# Patient Record
Sex: MALE | Race: WHITE | NOT HISPANIC OR LATINO | Employment: OTHER | URBAN - METROPOLITAN AREA
[De-identification: names, ages, dates, MRNs, and addresses within clinical notes are randomized per-mention and may not be internally consistent; named-entity substitution may affect disease eponyms.]

---

## 2017-03-22 ENCOUNTER — TRANSFERRED RECORDS (OUTPATIENT)
Dept: HEALTH INFORMATION MANAGEMENT | Facility: CLINIC | Age: 57
End: 2017-03-22

## 2017-08-01 ENCOUNTER — TRANSFERRED RECORDS (OUTPATIENT)
Dept: HEALTH INFORMATION MANAGEMENT | Facility: CLINIC | Age: 57
End: 2017-08-01

## 2017-10-25 ENCOUNTER — TRANSFERRED RECORDS (OUTPATIENT)
Dept: HEALTH INFORMATION MANAGEMENT | Facility: CLINIC | Age: 57
End: 2017-10-25

## 2018-01-26 ENCOUNTER — TELEPHONE (OUTPATIENT)
Dept: AUDIOLOGY | Facility: CLINIC | Age: 58
End: 2018-01-26

## 2018-01-26 DIAGNOSIS — H91.90 DECREASED HEARING, UNSPECIFIED LATERALITY: Primary | ICD-10-CM

## 2018-01-26 NOTE — TELEPHONE ENCOUNTER
Dear Dr. Childress,     To be in compliance with some payers, we must have a Physician's Order to perform Audiology services. Your patient, Wilbert, has an appointment to be seen by one of our Audiologists. Please complete the attached referral order.    We thank you for your cooperation. If you have any questions, please feel free to contact me.    Sheila Landon, CCC-A  Licensed Audiologist #[id number] [phone]

## 2018-02-01 ENCOUNTER — OFFICE VISIT (OUTPATIENT)
Dept: AUDIOLOGY | Facility: CLINIC | Age: 58
End: 2018-02-01
Payer: COMMERCIAL

## 2018-02-01 DIAGNOSIS — H93.13 TINNITUS, BILATERAL: ICD-10-CM

## 2018-02-01 DIAGNOSIS — H90.3 SENSORINEURAL HEARING LOSS, BILATERAL: Primary | ICD-10-CM

## 2018-02-01 PROCEDURE — 92567 TYMPANOMETRY: CPT | Performed by: AUDIOLOGIST

## 2018-02-01 PROCEDURE — 92557 COMPREHENSIVE HEARING TEST: CPT | Performed by: AUDIOLOGIST

## 2018-02-01 PROCEDURE — V5275 EAR IMPRESSION: HCPCS | Mod: RT | Performed by: AUDIOLOGIST

## 2018-02-01 PROCEDURE — 92591 HC HEARING AID EXAM BINAURAL: CPT | Performed by: AUDIOLOGIST

## 2018-02-01 PROCEDURE — 99207 ZZC NO CHARGE LOS: CPT | Performed by: AUDIOLOGIST

## 2018-02-01 NOTE — MR AVS SNAPSHOT
"              After Visit Summary   2/1/2018    Wilbert Hutchins    MRN: [id number]           Patient Information     Date Of Birth          1960        Visit Information        Provider Department      2/1/2018 2:00 PM Linsey Moody AuD [facility]        Today's Diagnoses     Sensorineural hearing loss, bilateral    -  1    Tinnitus, bilateral           Follow-ups after your visit        Your next 10 appointments already scheduled     Feb 22, 2018  8:00 AM CST   New Visit with Gina Landon   [facility] ([facility])    [address] [place] [address]   [phone]              Who to contact     If you have questions or need follow up information about today's clinic visit or your schedule please contact [facility] directly at [phone].  Normal or non-critical lab and imaging results will be communicated to you by MyChart, letter or phone within 4 business days after the clinic has received the results. If you do not hear from us within 7 days, please contact the clinic through MyChart or phone. If you have a critical or abnormal lab result, we will notify you by phone as soon as possible.  Submit refill requests through [employer] or call your pharmacy and they will forward the refill request to us. Please allow 3 business days for your refill to be completed.          Additional Information About Your Visit        MyChart Information     [facility]ara lets you send messages to your doctor, view your test results, renew your prescriptions, schedule appointments and more. To sign up, go to [email or web address] . Click on \"Log in\" on the left side of the screen, which will take you to the Welcome page. Then click on \"Sign up Now\" on the right side of the page.     You will be asked to enter the access code listed below, as well as some personal information. Please follow the directions to create your username and " password.     Your access code is: [email or web address]  Expires: 2018  2:51 PM     Your access code will  in 90 days. If you need help or a new code, please call your [facility] clinic or [phone].        Care EveryWhere ID     This is your Care EveryWhere ID. This could be used by other organizations to access your [facility] medical records  [id number]         Blood Pressure from Last 3 Encounters:   01/12/15 119/67   01/08/15 143/77   13 130/77    Weight from Last 3 Encounters:   01/12/15 206 lb (93.4 kg)   01/08/15 207 lb 3.2 oz (94 kg)   13 193 lb 6.4 oz (87.7 kg)              We Performed the Following     AUDIOGRAM/TYMPANOGRAM - INTERFACE     COMPREHENSIVE HEARING TEST     EAR IMPRESSION, EACH     HEARING AID EXAM BINAURAL     TYMPANOMETRY        Primary Care Provider Office Phone # Fax #    Nubia Childress [phone]       [address] [address]        Equal Access to Services     CAT ASHER AH: Hadii aad ku hadasho Soomaali, waaxda luqadaha, qaybta kaalmada adeegyada, waxay idiin hayaan marcela bhaagt . So [facility] [phone].    ATENCIÓN: Si habla español, tiene a osorio disposición servicios gratuitos de asistencia lingüística. [facility] [phone].    We comply with applicable federal civil rights laws and [place] laws. We do not discriminate on the basis of race, color, national origin, age, disability, sex, sexual orientation, or gender identity.            Thank you!     Thank you for choosing [facility]  for your care. Our goal is always to provide you with excellent care. Hearing back from our patients is one way we can continue to improve our services. Please take a few minutes to complete the written survey that you may receive in the mail after your visit with us. Thank you!             Your Updated Medication List - Protect others around you: Learn how to safely use, store and throw away  your medicines at www.disposemymeds.org.          This list is accurate as of 2/1/18  2:51 PM.  Always use your most recent med list.                   Brand Name Dispense Instructions for use Diagnosis    * albuterol 108 (90 BASE) MCG/ACT Inhaler    PROAIR HFA/PROVENTIL HFA/VENTOLIN HFA    1 Inhaler    Inhale 2 puffs into the lungs every 6 hours as needed for shortness of breath / dyspnea    COPD (chronic obstructive pulmonary disease) (H)       * PROAIR  (90 BASE) MCG/ACT Inhaler   Generic drug:  albuterol     3 Inhaler    INHALE 2 PUFFS INTO THE LUNGS EVERY 6 HOURS AS NEEDED FOR SHORTNESS OF BREATH / DYSPNEA.    COPD (chronic obstructive pulmonary disease) (H)       * Notice:  This list has 2 medication(s) that are the same as other medications prescribed for you. Read the directions carefully, and ask your doctor or other care provider to review them with you.

## 2018-02-01 NOTE — PROGRESS NOTES
AUDIOLOGY REPORT    SUBJECTIVE:  Wilbert Hutchins is a 57 year old male who was seen in the Audiology Clinic at the [facility] for audiologic evaluation, referred by Dr. Nubia Childress. The patient has been seen previously in this clinic on 3/26/2012 for assessment and results indicated mild-moderately severe sensorineural hearing loss bilaterally. The patient reports a suspected decline in hearing. He also reports bilateral tinnitus, which he has had for many years. The patient denies otalgia, aural fullness, otorrhea, and vertigo. Patient currently wears two [employer] Janet 312UZ half shell hearing aids, fit in this clinic on 4/26/2012. Patient is interested in new hearing aids. The patient notes difficulty with communication in a variety of listening situations.     OBJECTIVE:    Otoscopic exam indicates ears are clear of cerumen bilaterally     Pure Tone Thresholds assessed using conventional audiometry with good  reliability from 250-8000 Hz bilaterally using circumaural headphones     RIGHT:  mild and severe sensorineural hearing loss    LEFT:    mild and severe sensorineural hearing loss    Tympanogram:    RIGHT: normal eardrum mobility    LEFT:   normal eardrum mobility    Speech Reception Threshold:    RIGHT: 45 dB HL    LEFT:   45 dB HL    Word Recognition Score:     RIGHT: 92% at 85 dB HL using NU-6 recorded word list.    LEFT:   88% at 85 dB HL using NU-6 recorded word list.    Results of today's pure tone testing demonstrate a significant decline in hearing in the right ear at all frequencies tested except for 500 Hz, as well as in the left ear at 1000 Hz and 4059-8656 Hz re: audiogram from 3/26/2012. Patient is a hearing aid candidate. Patient would like to move forward with a hearing aid evaluation today. Therefore, the patient was presented with different options for amplification to help aid in communication. Discussed styles, levels of technology and monaural vs. binaural fitting.     The  hearing aid(s) mutually chosen were:  Binaural: ReSound Linx2 7 half shell  COLOR: pink  BATTERY SIZE: 312  EARMOLD/TIPS: not applicable  CANAL/ LENGTH: not applicable    Otoscopy revealed ears are clear of cerumen bilaterally. Bilateral earmolds were taken without incident.    ASSESSMENT:   Bilateral sensorineural hearing loss     Reviewed purchase information and warranty information with patient. The 45 day trial period was explained to patient. The patient was given a copy of the [place] [employer] consumer brochure on purchasing hearing instruments. Patient risk factors have been provided to the patient in writing prior to the sale of the hearing aid per FDA regulation. The risk factors are also available in the User Instructional Booklet to be presented on the day of the hearing aid fitting. Hearing aid(s) ordered. Hearing aid evaluation completed.    PLAN: Wilbert is scheduled to return in 2-3 weeks for a hearing aid fitting and programming. Purchase agreement will be completed on that date. Please contact this clinic with any questions or concerns.    Sheila Sandoval, CCC-A  Licensed Audiologist #[id number]  2/1/2018

## 2018-02-22 ENCOUNTER — OFFICE VISIT (OUTPATIENT)
Dept: AUDIOLOGY | Facility: CLINIC | Age: 58
End: 2018-02-22
Payer: COMMERCIAL

## 2018-02-22 DIAGNOSIS — H90.3 SENSORINEURAL HEARING LOSS, BILATERAL: Primary | ICD-10-CM

## 2018-02-22 PROCEDURE — V5020 CONFORMITY EVALUATION: HCPCS | Mod: LT | Performed by: AUDIOLOGIST

## 2018-02-22 PROCEDURE — V5160 DISPENSING FEE BINAURAL: HCPCS | Performed by: AUDIOLOGIST

## 2018-02-22 PROCEDURE — V5011 HEARING AID FITTING/CHECKING: HCPCS | Mod: RT | Performed by: AUDIOLOGIST

## 2018-02-22 PROCEDURE — 99207 ZZC NO CHARGE LOS: CPT | Performed by: AUDIOLOGIST

## 2018-02-22 PROCEDURE — V5020 CONFORMITY EVALUATION: HCPCS | Mod: RT | Performed by: AUDIOLOGIST

## 2018-02-22 PROCEDURE — V5260 HEARING AID, DIGIT, BIN, ITE: HCPCS | Mod: NU | Performed by: AUDIOLOGIST

## 2018-02-22 NOTE — MR AVS SNAPSHOT
"              After Visit Summary   2/22/2018    Wilbert Hutchins    MRN: [id number]           Patient Information     Date Of Birth          1960        Visit Information        Provider Department      2/22/2018 8:00 AM Linsey Moody AuD [facility]        Today's Diagnoses     Sensorineural hearing loss, bilateral    -  1       Follow-ups after your visit        Your next 10 appointments already scheduled     Mar 08, 2018 10:00 AM CST   Return Visit with Gina Landon   [facility]    [address] [place] [address]  [place] [address]   [phone]              Who to contact     If you have questions or need follow up information about today's clinic visit or your schedule please contact [facility] directly at [phone].  Normal or non-critical lab and imaging results will be communicated to you by MyChart, letter or phone within 4 business days after the clinic has received the results. If you do not hear from us within 7 days, please contact the clinic through MyChart or phone. If you have a critical or abnormal lab result, we will notify you by phone as soon as possible.  Submit refill requests through [employer] or call your pharmacy and they will forward the refill request to us. Please allow 3 business days for your refill to be completed.          Additional Information About Your Visit        MyChart Information     [employer] lets you send messages to your doctor, view your test results, renew your prescriptions, schedule appointments and more. To sign up, go to [email or web address] . Click on \"Log in\" on the left side of the screen, which will take you to the Welcome page. Then click on \"Sign up Now\" on the right side of the page.     You will be asked to enter the access code listed below, as well as some personal information. Please follow the directions to create your username and password.     Your access code " is: [email or web address]  Expires: 2018  2:51 PM     Your access code will  in 90 days. If you need help or a new code, please call your [facility] clinic or [phone].        Care EveryWhere ID     This is your Care EveryWhere ID. This could be used by other organizations to access your [facility] medical records  [id number]         Blood Pressure from Last 3 Encounters:   01/12/15 119/67   01/08/15 143/77   13 130/77    Weight from Last 3 Encounters:   01/12/15 206 lb (93.4 kg)   01/08/15 207 lb 3.2 oz (94 kg)   13 193 lb 6.4 oz (87.7 kg)              We Performed the Following     DISPENSING FEE, BINAURAL HEARING AID     HEARING AID CHECK, BINAURAL     HEARING AID CONFORMITY EVALUATION     HEARING AID FIT/ORIENTATION/CHECK     HEARING AID ITE DIGITAL, BINAURAL        Primary Care Provider Office Phone # Fax #    Nubia Childress [phone]       [address] [address]        Equal Access to Services     CAT ASHER AH: Hadii aad ku hadasho Soomaali, waaxda luqadaha, qaybta kaalmada adeegyada, waxay idiin hayaan adeeg lolitaarachristoph laalession ah. So [facility] [phone].    ATENCIÓN: Si habla español, tiene a osorio disposición servicios gratuitos de asistencia lingüística. Edilame al [phone].    We comply with applicable federal civil rights laws and [place] laws. We do not discriminate on the basis of race, color, national origin, age, disability, sex, sexual orientation, or gender identity.            Thank you!     Thank you for choosing [facility]  for your care. Our goal is always to provide you with excellent care. Hearing back from our patients is one way we can continue to improve our services. Please take a few minutes to complete the written survey that you may receive in the mail after your visit with us. Thank you!             Your Updated Medication List - Protect others around you: Learn how to safely use, store and  throw away your medicines at www.disposemymeds.org.          This list is accurate as of 2/22/18  8:52 AM.  Always use your most recent med list.                   Brand Name Dispense Instructions for use Diagnosis    * albuterol 108 (90 BASE) MCG/ACT Inhaler    PROAIR HFA/PROVENTIL HFA/VENTOLIN HFA    1 Inhaler    Inhale 2 puffs into the lungs every 6 hours as needed for shortness of breath / dyspnea    COPD (chronic obstructive pulmonary disease) (H)       * PROAIR  (90 BASE) MCG/ACT Inhaler   Generic drug:  albuterol     3 Inhaler    INHALE 2 PUFFS INTO THE LUNGS EVERY 6 HOURS AS NEEDED FOR SHORTNESS OF BREATH / DYSPNEA.    COPD (chronic obstructive pulmonary disease) (H)       * Notice:  This list has 2 medication(s) that are the same as other medications prescribed for you. Read the directions carefully, and ask your doctor or other care provider to review them with you.

## 2018-03-05 ENCOUNTER — TRANSFERRED RECORDS (OUTPATIENT)
Dept: HEALTH INFORMATION MANAGEMENT | Facility: CLINIC | Age: 58
End: 2018-03-05

## 2018-03-08 ENCOUNTER — OFFICE VISIT (OUTPATIENT)
Dept: AUDIOLOGY | Facility: CLINIC | Age: 58
End: 2018-03-08
Payer: COMMERCIAL

## 2018-03-08 DIAGNOSIS — H90.3 SENSORINEURAL HEARING LOSS, BILATERAL: Primary | ICD-10-CM

## 2018-03-08 PROCEDURE — V5266 BATTERY FOR HEARING DEVICE: HCPCS | Performed by: AUDIOLOGIST

## 2018-03-08 PROCEDURE — 99207 ZZC NO CHARGE LOS: CPT | Performed by: AUDIOLOGIST

## 2018-03-08 NOTE — PROGRESS NOTES
AUDIOLOGY REPORT    SUBJECTIVE:Wilbert Hutchins is a 57 year old male who was seen in the Audiology Clinic at the [facility] on 3/8/2018  for a follow-up check regarding the fitting of new hearing aids. Previous results have revealed miild-severe sensorineural hearing loss bilaterally.  The patient has been seen previously in this clinic and was fit with ReSound Linx2 7 ITEs on 2/22/2018.  Wilbert reports good sound quality with the hearing aid(s). He reports that he wears the hearing aids all day every day. He does note an occasional random beep, which seems to be preceded by a brief decrease in loudness. This is a different beep than the low battery indicator tone. Patient reports that batteries are lasting approximately 3 days. He would like the left hearing aid to be turned up a bit today.    OBJECTIVE:   The International Outcome Inventory-Hearing Aids (IOI-HA) was administered today.The patient s responses to the 7 questions can be compared to normative data relative to how others are performing with their hearing aids, as well as focusing audiologic care and counseling.This patient s Quality of Life score (Question 7) was 5, which is above normative average.     Based on patient report, the following changes were made: increased overall gain in the left hearing aid by four steps. Patient was pleased with this and felt that things sounded more balanced.    Checked data logging, which demonstrated 15 hours of average daily use time.     Played various indicator tones for patient and determined that the beep he has heard is the volume change signal. Discussed that the volume control may be getting bumped accidentally, or there may be an issue internal to the hearing aid. Patient reports this has only happened about three times, so he would like to continue to monitor for now.     Reviewed 45 day trial period, care, cleaning (no water, dry brush), batteries (size 312) insertion/removal,  toxicity, low-battery signal), aid insertion/removal, volume adjustment (if applicable), user booklet, warranty information, storage cases, and other hearing aid details.     Per guidelines of patient's insurance, 30 units of size 312 batteries were dispensed today. Reviewed that patent is eligible for batteries once every 90 days, and how they can request new batteries.     ASSESSMENT: A follow-up appointment for hearing aid fitting was completed today. IOI-HA administered today. Changes to hearing aids were completed as outlined above.     PLAN:Wilbert will return for follow-up as needed, or at least every 9-12 months for cleaning and assessment of hearing aid.  . Please call this clinic with any questions regarding today s appointment.    Sheila Sandoval, CCC-A  Licensed Audiologist #[id number]  3/8/2018

## 2018-03-08 NOTE — MR AVS SNAPSHOT
"              After Visit Summary   3/8/2018    Wilbetr Hutchins    MRN: [id number]           Patient Information     Date Of Birth          1960        Visit Information        Provider Department      3/8/2018 10:00 AM Linsey Moody AuD [facility]        Today's Diagnoses     Sensorineural hearing loss, bilateral    -  1       Follow-ups after your visit        Who to contact     If you have questions or need follow up information about today's clinic visit or your schedule please contact [facility] directly at [phone].  Normal or non-critical lab and imaging results will be communicated to you by Qstreamhart, letter or phone within 4 business days after the clinic has received the results. If you do not hear from us within 7 days, please contact the clinic through Qstreamhart or phone. If you have a critical or abnormal lab result, we will notify you by phone as soon as possible.  Submit refill requests through [facility] or call your pharmacy and they will forward the refill request to us. Please allow 3 business days for your refill to be completed.          Additional Information About Your Visit        MyChart Information     [facility] lets you send messages to your doctor, view your test results, renew your prescriptions, schedule appointments and more. To sign up, go to [email or web address] . Click on \"Log in\" on the left side of the screen, which will take you to the Welcome page. Then click on \"Sign up Now\" on the right side of the page.     You will be asked to enter the access code listed below, as well as some personal information. Please follow the directions to create your username and password.     Your access code is: [email or web address]  Expires: 2018  2:51 PM     Your access code will  in 90 days. If you need help or a new code, please call your [facility] or [phone].        Care EveryWhere ID     This is your Care EveryWhere ID. This could be " used by other organizations to access your [facility] medical records  [id number]         Blood Pressure from Last 3 Encounters:   01/12/15 119/67   01/08/15 143/77   12/11/13 130/77    Weight from Last 3 Encounters:   01/12/15 206 lb (93.4 kg)   01/08/15 207 lb 3.2 oz (94 kg)   12/11/13 193 lb 6.4 oz (87.7 kg)              We Performed the Following     HC HEARING DEVICE BATTERY        Primary Care Provider Office Phone # Fax #    Nubia Childress [phone]       [facility] [address] [facility] [address]        Equal Access to Services     CAT ASHER : Hadii aad ku hadasho Soal, waaxda luqadaha, qaybta kaalmada adeegyada, waxdiana bhagat . So [facility] [phone].    ATENCIÓN: Si habla español, tiene a osorio disposición servicios gratuitos de asistencia lingüística. Llame al [phone].    We comply with applicable federal civil rights laws and [place] laws. We do not discriminate on the basis of race, color, national origin, age, disability, sex, sexual orientation, or gender identity.            Thank you!     Thank you for choosing [facility]  for your care. Our goal is always to provide you with excellent care. Hearing back from our patients is one way we can continue to improve our services. Please take a few minutes to complete the written survey that you may receive in the mail after your visit with us. Thank you!             Your Updated Medication List - Protect others around you: Learn how to safely use, store and throw away your medicines at www.disposemymeds.org.          This list is accurate as of 3/8/18 10:27 AM.  Always use your most recent med list.                   Brand Name Dispense Instructions for use Diagnosis    * albuterol 108 (90 BASE) MCG/ACT Inhaler    PROAIR HFA/PROVENTIL HFA/VENTOLIN HFA    1 Inhaler    Inhale 2 puffs into the lungs every 6 hours as needed for shortness of breath / dyspnea     COPD (chronic obstructive pulmonary disease) (H)       * PROAIR  (90 BASE) MCG/ACT Inhaler   Generic drug:  albuterol     3 Inhaler    INHALE 2 PUFFS INTO THE LUNGS EVERY 6 HOURS AS NEEDED FOR SHORTNESS OF BREATH / DYSPNEA.    COPD (chronic obstructive pulmonary disease) (H)       * Notice:  This list has 2 medication(s) that are the same as other medications prescribed for you. Read the directions carefully, and ask your doctor or other care provider to review them with you.

## 2018-05-09 ENCOUNTER — TRANSFERRED RECORDS (OUTPATIENT)
Dept: HEALTH INFORMATION MANAGEMENT | Facility: CLINIC | Age: 58
End: 2018-05-09

## 2018-05-30 ENCOUNTER — TRANSFERRED RECORDS (OUTPATIENT)
Dept: HEALTH INFORMATION MANAGEMENT | Facility: CLINIC | Age: 58
End: 2018-05-30

## 2018-05-30 ENCOUNTER — TELEPHONE (OUTPATIENT)
Dept: AUDIOLOGY | Facility: CLINIC | Age: 58
End: 2018-05-30

## 2018-05-30 NOTE — TELEPHONE ENCOUNTER
Left message informing patient that he is not eligible for batteries until 6/8. Informed him that I can mail batteries to him on the 8th (it will take about a week for him to receive them), or he can stop in and pick them up on 6/8. Let patient know that I will plan on mailing batteries on 6/8 unless he calls to let me know that he would like to come and pick them up.     Sheila Sandoval, CCC-A  Licensed Audiologist #[id number]  5/30/2018

## 2018-05-30 NOTE — TELEPHONE ENCOUNTER
Reason for Call:  Other call back    Detailed comments:  Patient calling. He needs batteries for his hearing aids. Please call and let know.     Phone Number Patient can be reached at: Home number on file [phone] (home)    Best Time:  any    Can we leave a detailed message on this number? YES    Call taken on 5/30/2018 at 9:44 AM by Nara Love

## 2018-06-08 DIAGNOSIS — H90.3 SENSORINEURAL HEARING LOSS, BILATERAL: Primary | ICD-10-CM

## 2018-06-08 PROCEDURE — V5266 BATTERY FOR HEARING DEVICE: HCPCS | Performed by: AUDIOLOGIST

## 2018-06-08 NOTE — PROGRESS NOTES
Patient calls to request his 3 month supply of batteries be mailed to his home address. Mailed 24 size 312 batteries to the address on file.     CHARGES:   .002 x24 Batteries ($1). Total: $24. Bill to patient's insurance.    Sheila Sandoval, CCC-A  Licensed Audiologist #[id number]  6/8/2018

## 2018-10-11 ENCOUNTER — TRANSFERRED RECORDS (OUTPATIENT)
Dept: HEALTH INFORMATION MANAGEMENT | Facility: CLINIC | Age: 58
End: 2018-10-11

## 2019-04-03 ENCOUNTER — TRANSFERRED RECORDS (OUTPATIENT)
Dept: HEALTH INFORMATION MANAGEMENT | Facility: CLINIC | Age: 59
End: 2019-04-03

## 2019-04-24 ENCOUNTER — TRANSFERRED RECORDS (OUTPATIENT)
Dept: HEALTH INFORMATION MANAGEMENT | Facility: CLINIC | Age: 59
End: 2019-04-24

## 2019-05-02 DIAGNOSIS — J84.9 ILD (INTERSTITIAL LUNG DISEASE) (H): Primary | ICD-10-CM

## 2019-05-03 ENCOUNTER — MEDICAL CORRESPONDENCE (OUTPATIENT)
Dept: HEALTH INFORMATION MANAGEMENT | Facility: CLINIC | Age: 59
End: 2019-05-03

## 2019-05-28 ASSESSMENT — ENCOUNTER SYMPTOMS
WEIGHT GAIN: 0
COUGH: 1
SNORES LOUDLY: 0
POLYDIPSIA: 0
WEIGHT LOSS: 0
CHILLS: 1
POSTURAL DYSPNEA: 1
DYSPNEA ON EXERTION: 1
HEMOPTYSIS: 0
SHORTNESS OF BREATH: 1
NIGHT SWEATS: 0
WHEEZING: 1
POLYPHAGIA: 0
FEVER: 0
DECREASED APPETITE: 1
FATIGUE: 1
INCREASED ENERGY: 1
HALLUCINATIONS: 0
HOARSE VOICE: 0
SPUTUM PRODUCTION: 1
SORE THROAT: 0
SINUS PAIN: 0
SINUS CONGESTION: 1
TROUBLE SWALLOWING: 0
NECK MASS: 0
COUGH DISTURBING SLEEP: 1
SMELL DISTURBANCE: 0
TASTE DISTURBANCE: 0
ALTERED TEMPERATURE REGULATION: 1

## 2019-05-31 ENCOUNTER — OFFICE VISIT (OUTPATIENT)
Dept: PULMONOLOGY | Facility: CLINIC | Age: 59
End: 2019-05-31
Attending: INTERNAL MEDICINE
Payer: COMMERCIAL

## 2019-05-31 ENCOUNTER — ANCILLARY PROCEDURE (OUTPATIENT)
Dept: CT IMAGING | Facility: CLINIC | Age: 59
End: 2019-05-31
Attending: INTERNAL MEDICINE
Payer: COMMERCIAL

## 2019-05-31 VITALS
DIASTOLIC BLOOD PRESSURE: 81 MMHG | BODY MASS INDEX: 32.93 KG/M2 | OXYGEN SATURATION: 96 % | SYSTOLIC BLOOD PRESSURE: 136 MMHG | WEIGHT: 230 LBS | HEART RATE: 61 BPM | HEIGHT: 70 IN

## 2019-05-31 DIAGNOSIS — M25.541 ARTHRALGIA OF BOTH HANDS: ICD-10-CM

## 2019-05-31 DIAGNOSIS — J84.9 ILD (INTERSTITIAL LUNG DISEASE) (H): ICD-10-CM

## 2019-05-31 DIAGNOSIS — M25.542 ARTHRALGIA OF BOTH HANDS: ICD-10-CM

## 2019-05-31 DIAGNOSIS — Z72.0 CURRENT TOBACCO USE: ICD-10-CM

## 2019-05-31 DIAGNOSIS — F12.90 MARIJUANA USE: ICD-10-CM

## 2019-05-31 DIAGNOSIS — J84.9 ILD (INTERSTITIAL LUNG DISEASE) (H): Primary | ICD-10-CM

## 2019-05-31 PROBLEM — M25.50 ARTHRALGIA: Status: ACTIVE | Noted: 2019-05-31

## 2019-05-31 LAB
6 MIN WALK (FT): 785 FT
6 MIN WALK (M): 239 M
ALBUMIN SERPL-MCNC: 3.6 G/DL (ref 3.4–5)
ALP SERPL-CCNC: 67 U/L (ref 40–150)
ALT SERPL W P-5'-P-CCNC: 20 U/L (ref 0–70)
ANION GAP SERPL CALCULATED.3IONS-SCNC: 8 MMOL/L (ref 3–14)
AST SERPL W P-5'-P-CCNC: 11 U/L (ref 0–45)
BASOPHILS # BLD AUTO: 0.1 10E9/L (ref 0–0.2)
BASOPHILS NFR BLD AUTO: 0.5 %
BILIRUB SERPL-MCNC: 0.3 MG/DL (ref 0.2–1.3)
BUN SERPL-MCNC: 11 MG/DL (ref 7–30)
CALCIUM SERPL-MCNC: 9.7 MG/DL (ref 8.5–10.1)
CHLORIDE SERPL-SCNC: 107 MMOL/L (ref 94–109)
CK SERPL-CCNC: 83 U/L (ref 30–300)
CO2 SERPL-SCNC: 24 MMOL/L (ref 20–32)
CREAT SERPL-MCNC: 0.91 MG/DL (ref 0.66–1.25)
CRP SERPL-MCNC: 24.8 MG/L (ref 0–8)
DIFFERENTIAL METHOD BLD: ABNORMAL
ENA JO1 IGG SER-ACNC: <0.2 AI (ref 0–0.9)
ENA SCL70 IGG SER IA-ACNC: <0.2 AI (ref 0–0.9)
ENA SS-A IGG SER IA-ACNC: <0.2 AI (ref 0–0.9)
ENA SS-B IGG SER IA-ACNC: <0.2 AI (ref 0–0.9)
EOSINOPHIL # BLD AUTO: 0.2 10E9/L (ref 0–0.7)
EOSINOPHIL NFR BLD AUTO: 1.5 %
ERYTHROCYTE [DISTWIDTH] IN BLOOD BY AUTOMATED COUNT: 13.2 % (ref 10–15)
ERYTHROCYTE [SEDIMENTATION RATE] IN BLOOD BY WESTERGREN METHOD: 21 MM/H (ref 0–20)
GFR SERPL CREATININE-BSD FRML MDRD: >90 ML/MIN/{1.73_M2}
GLUCOSE SERPL-MCNC: 118 MG/DL (ref 70–99)
HCT VFR BLD AUTO: 45.9 % (ref 40–53)
HGB BLD-MCNC: 14.8 G/DL (ref 13.3–17.7)
IGG SERPL-MCNC: 536 MG/DL (ref 695–1620)
IGG1 SER-MCNC: 246 MG/DL (ref 300–856)
IGG2 SER-MCNC: 245 MG/DL (ref 158–761)
IGG3 SER-MCNC: 38 MG/DL (ref 24–192)
IGG4 SER-MCNC: 76 MG/DL (ref 11–86)
IMM GRANULOCYTES # BLD: 0.1 10E9/L (ref 0–0.4)
IMM GRANULOCYTES NFR BLD: 0.8 %
LYMPHOCYTES # BLD AUTO: 2.1 10E9/L (ref 0.8–5.3)
LYMPHOCYTES NFR BLD AUTO: 13 %
MCH RBC QN AUTO: 28.6 PG (ref 26.5–33)
MCHC RBC AUTO-ENTMCNC: 32.2 G/DL (ref 31.5–36.5)
MCV RBC AUTO: 89 FL (ref 78–100)
MONOCYTES # BLD AUTO: 1.3 10E9/L (ref 0–1.3)
MONOCYTES NFR BLD AUTO: 8.3 %
NEUTROPHILS # BLD AUTO: 12.2 10E9/L (ref 1.6–8.3)
NEUTROPHILS NFR BLD AUTO: 75.9 %
NRBC # BLD AUTO: 0 10*3/UL
NRBC BLD AUTO-RTO: 0 /100
PLATELET # BLD AUTO: 309 10E9/L (ref 150–450)
POTASSIUM SERPL-SCNC: 3.8 MMOL/L (ref 3.4–5.3)
PROT SERPL-MCNC: 7.5 G/DL (ref 6.8–8.8)
RBC # BLD AUTO: 5.17 10E12/L (ref 4.4–5.9)
RHEUMATOID FACT SER NEPH-ACNC: 52 IU/ML (ref 0–20)
SODIUM SERPL-SCNC: 138 MMOL/L (ref 133–144)
WBC # BLD AUTO: 16.1 10E9/L (ref 4–11)

## 2019-05-31 PROCEDURE — 86431 RHEUMATOID FACTOR QUANT: CPT | Performed by: INTERNAL MEDICINE

## 2019-05-31 PROCEDURE — 86200 CCP ANTIBODY: CPT | Performed by: INTERNAL MEDICINE

## 2019-05-31 PROCEDURE — 82784 ASSAY IGA/IGD/IGG/IGM EACH: CPT | Performed by: INTERNAL MEDICINE

## 2019-05-31 PROCEDURE — 82085 ASSAY OF ALDOLASE: CPT | Performed by: INTERNAL MEDICINE

## 2019-05-31 PROCEDURE — 86235 NUCLEAR ANTIGEN ANTIBODY: CPT | Performed by: INTERNAL MEDICINE

## 2019-05-31 PROCEDURE — 82657 ENZYME CELL ACTIVITY: CPT | Performed by: INTERNAL MEDICINE

## 2019-05-31 PROCEDURE — 80053 COMPREHEN METABOLIC PANEL: CPT | Performed by: INTERNAL MEDICINE

## 2019-05-31 PROCEDURE — 86255 FLUORESCENT ANTIBODY SCREEN: CPT | Performed by: INTERNAL MEDICINE

## 2019-05-31 PROCEDURE — 86606 ASPERGILLUS ANTIBODY: CPT | Performed by: INTERNAL MEDICINE

## 2019-05-31 PROCEDURE — 36415 COLL VENOUS BLD VENIPUNCTURE: CPT | Performed by: INTERNAL MEDICINE

## 2019-05-31 PROCEDURE — 85025 COMPLETE CBC W/AUTO DIFF WBC: CPT | Performed by: INTERNAL MEDICINE

## 2019-05-31 PROCEDURE — 82787 IGG 1 2 3 OR 4 EACH: CPT | Performed by: INTERNAL MEDICINE

## 2019-05-31 PROCEDURE — 86331 IMMUNODIFFUSION OUCHTERLONY: CPT | Performed by: INTERNAL MEDICINE

## 2019-05-31 PROCEDURE — 82550 ASSAY OF CK (CPK): CPT | Performed by: INTERNAL MEDICINE

## 2019-05-31 PROCEDURE — G0463 HOSPITAL OUTPT CLINIC VISIT: HCPCS

## 2019-05-31 PROCEDURE — 86038 ANTINUCLEAR ANTIBODIES: CPT | Performed by: INTERNAL MEDICINE

## 2019-05-31 PROCEDURE — 85652 RBC SED RATE AUTOMATED: CPT | Performed by: INTERNAL MEDICINE

## 2019-05-31 PROCEDURE — 86140 C-REACTIVE PROTEIN: CPT | Performed by: INTERNAL MEDICINE

## 2019-05-31 RX ORDER — SERTRALINE HYDROCHLORIDE 100 MG/1
100 TABLET, FILM COATED ORAL DAILY
COMMUNITY

## 2019-05-31 RX ORDER — GABAPENTIN 300 MG/1
600 CAPSULE ORAL 3 TIMES DAILY
COMMUNITY
Start: 2019-04-01

## 2019-05-31 RX ORDER — FOLIC ACID 1 MG/1
1 TABLET ORAL
COMMUNITY
Start: 2018-10-11 | End: 2021-04-15

## 2019-05-31 RX ORDER — PREDNISONE 10 MG/1
13 TABLET ORAL
COMMUNITY
Start: 2019-04-24 | End: 2021-09-14

## 2019-05-31 RX ORDER — SIMVASTATIN 40 MG
40 TABLET ORAL AT BEDTIME
Refills: 3 | COMMUNITY
Start: 2019-05-07

## 2019-05-31 RX ORDER — SULFAMETHOXAZOLE AND TRIMETHOPRIM 400; 80 MG/1; MG/1
1 TABLET ORAL
COMMUNITY
Start: 2019-04-03 | End: 2021-04-15

## 2019-05-31 RX ORDER — MYCOPHENOLATE MOFETIL 500 MG/1
TABLET ORAL
Refills: 3 | COMMUNITY
Start: 2019-04-06 | End: 2019-05-31

## 2019-05-31 RX ORDER — IBUPROFEN 800 MG/1
TABLET, FILM COATED ORAL
COMMUNITY
Start: 2012-05-29

## 2019-05-31 RX ORDER — HYDROXYCHLOROQUINE SULFATE 200 MG/1
200 TABLET, FILM COATED ORAL DAILY
COMMUNITY
Start: 2018-10-11

## 2019-05-31 RX ORDER — PEN NEEDLE, DIABETIC 29 G X1/2"
NEEDLE, DISPOSABLE MISCELLANEOUS
Refills: 11 | COMMUNITY
Start: 2019-03-04 | End: 2021-07-21

## 2019-05-31 RX ORDER — FLUTICASONE PROPIONATE 50 MCG
1 SPRAY, SUSPENSION (ML) NASAL DAILY
COMMUNITY
Start: 2018-10-11

## 2019-05-31 RX ORDER — CETIRIZINE HYDROCHLORIDE 10 MG/1
10 TABLET ORAL DAILY
COMMUNITY
Start: 2019-01-06

## 2019-05-31 RX ORDER — SYRINGE-NEEDLE,INSULIN,0.5 ML 27GX1/2"
SYRINGE, EMPTY DISPOSABLE MISCELLANEOUS
COMMUNITY
Start: 2019-02-26 | End: 2021-04-15

## 2019-05-31 ASSESSMENT — PAIN SCALES - GENERAL: PAINLEVEL: NO PAIN (0)

## 2019-05-31 ASSESSMENT — MIFFLIN-ST. JEOR: SCORE: 1874.52
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
## 2019-05-31 NOTE — NURSING NOTE
Chief Complaint   Patient presents with     New Patient     ild/ctd     Vitals were taken and medications were reconciled.     KATE Inman

## 2019-05-31 NOTE — NURSING NOTE
RN met with patient and gave clinic contact information sheet. [facility]. Overnight ox also sent to [facility].   Marah Sparks RN BSN

## 2019-06-01 LAB — ALDOLASE SERPL-CCNC: 5.9 U/L (ref 1.5–8.1)

## 2019-06-03 DIAGNOSIS — J84.9 ILD (INTERSTITIAL LUNG DISEASE) (H): Primary | ICD-10-CM

## 2019-06-03 LAB
ANA SER QL IF: NEGATIVE
ANCA AB PATTERN SER IF-IMP: NORMAL
C-ANCA TITR SER IF: NORMAL {TITER}
CCP AB SER IA-ACNC: 76 U/ML
TPMT BLD-CCNC: 26 U/ML (ref 24–44)

## 2019-06-05 ENCOUNTER — TRANSFERRED RECORDS (OUTPATIENT)
Dept: HEALTH INFORMATION MANAGEMENT | Facility: CLINIC | Age: 59
End: 2019-06-05

## 2019-06-05 LAB
A FUMIGATUS1 AB SER QL ID: NORMAL
A FUMIGATUS6 AB SER QL ID: NORMAL
A PULLULANS AB SER QL ID: NORMAL
PIGEON DROP IGE QN: NORMAL
S RECTIVIRGULA AB SER QL ID: NORMAL
T VULGARIS AB SER QL ID: NORMAL

## 2019-06-07 LAB — LAB SCANNED RESULT: NORMAL

## 2019-06-10 LAB
DLCOUNC-%PRED-PRE: 47 %
DLCOUNC-PRE: 13.24 ML/MIN/MMHG
DLCOUNC-PRED: 28.16 ML/MIN/MMHG
ERV-%PRED-PRE: 216 %
ERV-PRE: 1.39 L
ERV-PRED: 0.64 L
EXPTIME-PRE: 6.79 SEC
FEF2575-%PRED-POST: 108 %
FEF2575-%PRED-PRE: 78 %
FEF2575-POST: 3.43 L/SEC
FEF2575-PRE: 2.49 L/SEC
FEF2575-PRED: 3.17 L/SEC
FEFMAX-%PRED-PRE: 84 %
FEFMAX-PRE: 7.95 L/SEC
FEFMAX-PRED: 9.43 L/SEC
FEV1-%PRED-PRE: 70 %
FEV1-PRE: 2.63 L
FEV1FEV6-PRE: 81 %
FEV1FEV6-PRED: 79 %
FEV1FVC-PRE: 81 %
FEV1FVC-PRED: 78 %
FEV1SVC-PRE: 81 %
FEV1SVC-PRED: 73 %
FIFMAX-PRE: 6.47 L/SEC
FRCPLETH-%PRED-PRE: 83 %
FRCPLETH-PRE: 3 L
FRCPLETH-PRED: 3.58 L
FVC-%PRED-PRE: 68 %
FVC-PRE: 3.25 L
FVC-PRED: 4.76 L
IC-%PRED-PRE: 41 %
IC-PRE: 1.85 L
IC-PRED: 4.42 L
RVPLETH-%PRED-PRE: 68 %
RVPLETH-PRE: 1.61 L
RVPLETH-PRED: 2.35 L
TLCPLETH-%PRED-PRE: 68 %
TLCPLETH-PRE: 4.85 L
TLCPLETH-PRED: 7.13 L
VA-%PRED-PRE: 66 %
VA-PRE: 4.39 L
VC-%PRED-PRE: 64 %
VC-PRE: 3.24 L
VC-PRED: 5.06 L

## 2019-06-12 ENCOUNTER — PATIENT OUTREACH (OUTPATIENT)
Dept: PULMONOLOGY | Facility: CLINIC | Age: 59
End: 2019-06-12

## 2019-06-12 NOTE — PROGRESS NOTES
Left message for patient to return call regarding results of overnight oximetry study.  Patient should use oxygen 2 LPM at night and have study repeated.

## 2019-06-13 DIAGNOSIS — J84.9 ILD (INTERSTITIAL LUNG DISEASE) (H): Primary | ICD-10-CM

## 2019-06-13 DIAGNOSIS — R09.02 HYPOXIA: ICD-10-CM

## 2019-06-14 NOTE — PROGRESS NOTES
Spoke to patient, informed him of result. Agree with plan to wear oxygen with Cpap. Order faxed to [facility] to re-test on 2 LPM oxygen.

## 2019-06-19 ENCOUNTER — HOSPITAL ENCOUNTER (OUTPATIENT)
Dept: CARDIAC REHAB | Facility: CLINIC | Age: 59
End: 2019-06-19
Attending: INTERNAL MEDICINE
Payer: COMMERCIAL

## 2019-06-19 DIAGNOSIS — J84.9 ILD (INTERSTITIAL LUNG DISEASE) (H): ICD-10-CM

## 2019-06-19 PROCEDURE — G0238 OTH RESP PROC, INDIV: HCPCS

## 2019-06-19 PROCEDURE — 40000244 ZZH STATISTIC VISIT PULM REHAB

## 2019-06-21 ENCOUNTER — TRANSFERRED RECORDS (OUTPATIENT)
Dept: HEALTH INFORMATION MANAGEMENT | Facility: CLINIC | Age: 59
End: 2019-06-21

## 2019-06-24 ENCOUNTER — HOSPITAL ENCOUNTER (OUTPATIENT)
Dept: CARDIAC REHAB | Facility: CLINIC | Age: 59
End: 2019-06-24
Attending: INTERNAL MEDICINE
Payer: COMMERCIAL

## 2019-06-24 PROCEDURE — G0238 OTH RESP PROC, INDIV: HCPCS

## 2019-06-24 PROCEDURE — 40000244 ZZH STATISTIC VISIT PULM REHAB

## 2019-06-26 ENCOUNTER — HOSPITAL ENCOUNTER (OUTPATIENT)
Dept: CARDIAC REHAB | Facility: CLINIC | Age: 59
End: 2019-06-26
Attending: INTERNAL MEDICINE
Payer: COMMERCIAL

## 2019-06-26 PROCEDURE — G0239 OTH RESP PROC, GROUP: HCPCS

## 2019-06-26 PROCEDURE — 40000244 ZZH STATISTIC VISIT PULM REHAB

## 2019-06-27 ENCOUNTER — PATIENT OUTREACH (OUTPATIENT)
Dept: PULMONOLOGY | Facility: CLINIC | Age: 59
End: 2019-06-27

## 2019-06-27 NOTE — PROGRESS NOTES
Received result of overnight oximetry done on 2 LPM oxygen, Dr Lin reviewed, looks good, oxygen level stayed above 90% all night. Continue with 2 LPM at night as instructed. Informed patient. Agreed with plan.

## 2019-07-01 ENCOUNTER — HOSPITAL ENCOUNTER (OUTPATIENT)
Dept: CARDIAC REHAB | Facility: CLINIC | Age: 59
End: 2019-07-01
Attending: INTERNAL MEDICINE
Payer: COMMERCIAL

## 2019-07-01 PROCEDURE — 93798 PHYS/QHP OP CAR RHAB W/ECG: CPT

## 2019-07-01 PROCEDURE — 40000116 ZZH STATISTIC OP CR VISIT

## 2019-07-01 PROCEDURE — G0239 OTH RESP PROC, GROUP: HCPCS

## 2019-07-01 PROCEDURE — 40000244 ZZH STATISTIC VISIT PULM REHAB

## 2019-07-03 ENCOUNTER — HOSPITAL ENCOUNTER (OUTPATIENT)
Dept: CARDIAC REHAB | Facility: CLINIC | Age: 59
End: 2019-07-03
Attending: INTERNAL MEDICINE
Payer: COMMERCIAL

## 2019-07-03 PROCEDURE — 40000244 ZZH STATISTIC VISIT PULM REHAB

## 2019-07-03 PROCEDURE — G0239 OTH RESP PROC, GROUP: HCPCS

## 2019-07-08 ENCOUNTER — HOSPITAL ENCOUNTER (OUTPATIENT)
Dept: CARDIAC REHAB | Facility: CLINIC | Age: 59
End: 2019-07-08
Attending: INTERNAL MEDICINE
Payer: COMMERCIAL

## 2019-07-08 PROCEDURE — G0239 OTH RESP PROC, GROUP: HCPCS

## 2019-07-08 PROCEDURE — 40000244 ZZH STATISTIC VISIT PULM REHAB

## 2019-07-17 ENCOUNTER — HOSPITAL ENCOUNTER (OUTPATIENT)
Dept: CARDIAC REHAB | Facility: CLINIC | Age: 59
End: 2019-07-17
Attending: INTERNAL MEDICINE
Payer: COMMERCIAL

## 2019-07-17 PROCEDURE — G0239 OTH RESP PROC, GROUP: HCPCS

## 2019-07-17 PROCEDURE — 40000244 ZZH STATISTIC VISIT PULM REHAB

## 2019-07-19 ENCOUNTER — OFFICE VISIT (OUTPATIENT)
Dept: PULMONOLOGY | Facility: CLINIC | Age: 59
End: 2019-07-19
Attending: INTERNAL MEDICINE
Payer: COMMERCIAL

## 2019-07-19 ENCOUNTER — ANCILLARY PROCEDURE (OUTPATIENT)
Dept: CARDIOLOGY | Facility: CLINIC | Age: 59
End: 2019-07-19
Attending: INTERNAL MEDICINE
Payer: COMMERCIAL

## 2019-07-19 VITALS
OXYGEN SATURATION: 94 % | WEIGHT: 225.7 LBS | BODY MASS INDEX: 32.31 KG/M2 | HEIGHT: 70 IN | HEART RATE: 51 BPM | SYSTOLIC BLOOD PRESSURE: 125 MMHG | DIASTOLIC BLOOD PRESSURE: 75 MMHG

## 2019-07-19 DIAGNOSIS — M25.542 ARTHRALGIA OF BOTH HANDS: ICD-10-CM

## 2019-07-19 DIAGNOSIS — J84.9 ILD (INTERSTITIAL LUNG DISEASE) (H): ICD-10-CM

## 2019-07-19 DIAGNOSIS — M25.541 ARTHRALGIA OF BOTH HANDS: ICD-10-CM

## 2019-07-19 DIAGNOSIS — J84.9 ILD (INTERSTITIAL LUNG DISEASE) (H): Primary | ICD-10-CM

## 2019-07-19 DIAGNOSIS — Z72.0 CURRENT TOBACCO USE: ICD-10-CM

## 2019-07-19 LAB
6 MIN WALK (FT): 1210 FT
6 MIN WALK (M): 369 M

## 2019-07-19 PROCEDURE — G0463 HOSPITAL OUTPT CLINIC VISIT: HCPCS | Mod: 25,ZF

## 2019-07-19 ASSESSMENT — MIFFLIN-ST. JEOR: SCORE: 1855.02

## 2019-07-19 ASSESSMENT — PAIN SCALES - GENERAL: PAINLEVEL: MILD PAIN (3)

## 2019-07-19 NOTE — NURSING NOTE
Chief Complaint   Patient presents with     Follow Up     8wk fu     Vitals were taken and medications were reconciled.     KATE Inman

## 2019-07-19 NOTE — LETTER
7/19/2019       RE: Wilbert Hutchins  [address]  [facility] [id number]     Dear Colleague,    Thank you for referring your patient, Wilbert Hutchins, to the [facility] at [facility]. Please see a copy of my visit note below.    [facility]  ILD Clinic - Return Visit -  July 19, 2019         Assessment and Plan:   Wilbert Hutchins is a 59 year old male with a history of ILD who presents for a return visit.     1) Interstitial lung disease  - His case was reviewed at our ILD conference.  His CT looks consistent with DIP or NSIP.  His spirometry today shows stable to slightly improved FVC.   - We talked about smoking cessation at length and discussed short and long-term consequences of smoking history.  He was offered nicotine replacement therapy or referral to a smoking cessation program, but he states that he knows he can quit on his own and would like to try. He has one more carton left of his cigarettes and states after that he will not buy any more cigarettes.   - TPMT level was checked on his last visit and was normal.   - He needs to stop smoking since there is concern for smoking associated ILD (DIP), however he does have positive RF and a chest imaging that has features of NSIP, and would meet criteria for IPAF as well.  He is borderline in terms of being a candidate for a surgical biopsy, and I would not recommend this at this point since I doubt it will change his treatment course.   - If his lung function deteriorate, I would consider starting another agent such as Azathioprine.     2) Arthralgias  - His hand films were reviewed during ILD conference; His joint spaces are preserved without any joint space narrowing or erosions.     2) Tobacco use  - As above    RTC: 2 months    Valery Lin MD  Pulmonary, Allergy, Critical Care and Sleep Medicine           Problem List:     1. Interstitial lung disease  a. HRCT (May 31, 2019) Indeterminate for UIP;   i. Peripheral reticulation, mild paraseptal emphysema, GGO and peripheral reticulation and fibrosis basilar predominant. Mediastinal lymphadenopathy.  No air trapping on expiratory views.  b. [facility]  c. Rheum: Dr. Rg Deleon ([facility]  d. Treatment   i. On Prednisone since March 2018  ii. CellCept initiated in 2019 but could not tolerate due to exacerbation of arthralgias  iii. On Plaquinil   iv. Prednisone dose currently 11mg     2. Tobacco use    3. Migrating arthralgias  a. Positive RF (52) and Anti-CCP (76)  b. No erosive arthritis on right hand film (April 6, 2017)    4. Obesity (Body mass index is 32.38 kg/m .)          Interval History:     Wilbert Hutchins is a 59 year old male with ILD who presents for a return visit. He was last seen on May 31 by me.     Since his last visit, his respiratory status has been stable.  He has started pulmonary rehabilitation at [facility], and is walking on a treadmill at a speed of 1.9 mph for 10 minutes continuously.  In addition, he does NuStep at level of 2.2, and 110 pounds leg press.  He goes to pulmonary rehabilitation twice a week.  In terms of other exercises, he states that he walks his dog every day.    He continues to have migrating arthralgias.  He is currently on Prednisone down to 11mg daily.  He has a history of intolerance to CellCept due to worsening of his arthralgias.    Unfortunately, he is still smoking about a pack a day.  After contemplating quitting, he has decided that he was not ready.  He states that he enjoys doing this and there is no reason to quit.          Review of Systems:     Please see HPI, otherwise the complete 10 point ROS is negative.           Past Medical and Surgical History:     Past Medical History:   Diagnosis Date     Adjustment disorder with mixed disturbance of  emotions and conduct 7/25/2007     Cigarette smoker      Hyperlipidemia LDL goal <160 2/22/2012     ILD (interstitial lung disease) (H) 5/31/2019     Marijuana use 5/31/2019     Overweight (BMI 25.0-29.9) 2/22/2012     Past Surgical History:   Procedure Laterality Date     COLONOSCOPY  3/12    normal     ENT SURGERY       ORTHOPEDIC SURGERY             Family History:     Family History   Problem Relation Age of Onset     Hypertension Mother      Breast Cancer Mother      Alcohol/Drug Mother      Alcohol/Drug Brother      Diabetes Paternal Grandmother      Alcoholism Father      Cancer No family hx of      Cerebrovascular Disease No family hx of      Thyroid Disease No family hx of      Glaucoma No family hx of      Macular Degeneration No family hx of             Social History:     Social History     Socioeconomic History     Marital status: Single     Spouse name: Not on file     Number of children: Not on file     Years of education: Not on file     Highest education level: Not on file   Occupational History     Not on file   Social Needs     Financial resource strain: Not on file     Food insecurity:     Worry: Not on file     Inability: Not on file     Transportation needs:     Medical: Not on file     Non-medical: Not on file   Tobacco Use     Smoking status: Current Some Day Smoker     Packs/day: 0.75     Types: Cigarettes     Smokeless tobacco: Never Used     Tobacco comment: cutting way down   Substance and Sexual Activity     Alcohol use: No     Drug use: Yes     Sexual activity: Yes     Partners: Female   Lifestyle     Physical activity:     Days per week: Not on file     Minutes per session: Not on file     Stress: Not on file   Relationships     Social connections:     Talks on phone: Not on file     Gets together: Not on file     Attends Cheondoism service: Not on file     Active member of club or organization: Not on file     Attends meetings of clubs or organizations: Not on file     Relationship  "status: Not on file     Intimate partner violence:     Fear of current or ex partner: Not on file     Emotionally abused: Not on file     Physically abused: Not on file     Forced sexual activity: Not on file   Other Topics Concern     Parent/sibling w/ CABG, MI or angioplasty before 65F 55M? Not Asked   Social History Narrative    He is a  and was in the [employer].      He was on active duty and was in [place] in 1993 when a barrack he was in was blown up.        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    He denies any exposure to birds, but his partner uses a down pillow.  He denies any humidifiers hot tubs or steam sallowness.    He does have a pet pit bull.        He currently lives in [facility] in a house that he has lived in for the past 9 years.  He denies any known water damage or mold.            Medications:     Current Outpatient Medications   Medication     albuterol (PROAIR HFA, PROVENTIL HFA, VENTOLIN HFA) 108 (90 BASE) MCG/ACT inhaler     BD INSULIN SYRINGE U/F 31G X 5/16\" 1 ML miscellaneous     cetirizine (ZYRTEC) 10 MG tablet     cholecalciferol (VITAMIN D-1000 MAX ST) 1000 units TABS     fluticasone (FLONASE) 50 MCG/ACT nasal spray     folic acid (FOLVITE) 1 MG tablet     gabapentin (NEURONTIN) 300 MG capsule     hydroxychloroquine (PLAQUENIL) 200 MG tablet     ibuprofen (IBU) 800 MG tablet     insulin NPH (NOVOLIN N VIAL) 100 UNIT/ML vial     insulin syringe-needle U-100 (ELITE-THIN INSULIN SYRINGE) 31G X 5/16\" 1 ML miscellaneous     order for DME     predniSONE (DELTASONE) 10 MG tablet     ranitidine (ZANTAC) 150 MG tablet     sertraline (ZOLOFT) 100 MG tablet     simvastatin (ZOCOR) 40 MG tablet     sulfamethoxazole-trimethoprim (BACTRIM/SEPTRA) 400-80 MG tablet     No current facility-administered medications for this visit.             Physical Exam:   /75   Pulse 51   Ht 1.778 m (5' 10\")  "  Wt 102.4 kg (225 lb 11.2 oz)   SpO2 94%   BMI 32.38 kg/m       GENERAL: alert, NAD  HEENT: NCAT, EOMI, no scleral icterus, oral mucosa moist and without lesions  Neck: no cervical or supraclavicular adenopathy  Lungs: inspiratory crackles  CV: RRR, S1S2, no murmurs noted  Abdomen: normoactive BS, soft, non tender   Lymph: no edema  Neuro: AAO X 3  Psychiatric: normal affect, good eye contact  Skin: no rash, jaundice or lesions on limited exam  Extremities: No cyanosis, clubbing or edema. No digital edema, no synovitis or joint swelling.  No ulcers, skin thickening or fissures.           Imaging:     HRCT chest w/o contrast (May 31, 2019)  Peripheral reticulation, mild paraseptal emphysema, GGO and peripheral reticulation and fibrosis basilar predominant. Mediastinal lymphadenopathy.  No air trapping on expiratory views.           Pulmonary Function Tests:     Date TLC (%) FVC (%) FEV1 (%) FEV1/FVC DLCO (%)   3/22/2017 5.24 77 3.63 76 3.13 85 86 16.11 51   4/3/2019  4.84 71 3.30 70 2.81 78 85 13.52(unc) 43   5/31/2019 4.85 68 3.25 68 2.63 70 78 13.24 47    7/19/19     3.40 71 2.68 72 79 14.39  51     PFT interpretation (July 19, 2019):   Maneuver: valid and meets ATS guidelines  Spirometry: FVC and FEV1 are both reduced in a pattern to suggest mild restriction. Lung volumes needed to confirm.   Diffusinc capacity: There is moderate impairment in diffusing capacity, but is uncorrected for hemoglobin.     6-minute walk test (July 19, 2019)  He was able to ambulate for 1210 feet or 369 m in 6 minutes (LLN 1507 feet / 459 m).  There is no room air SPO2 done.  On 2 L/min of supplemental oxygen, his lowest SPO2 is 93%.  Compared to his prior study in May 31, his six minute walk distance has increased by 425 feet or 130 meters.          Laboratory:     Serology (May 31, 2019)  RALPH - neg  Anti-CCP - 76 (<7)  RF - 52 (<20)  SSA, SSB - neg  Anti-Scl-70 - neg  Anti-Jo1 - neg  ANCA - neg  CRP - 24.8 (<8)  ESR - 21  (<20)  Aldolase - 5.9  CK - 83    HP panel - neg    TPMP - 26 (low risk)    Serology ([facility]; 2017)  RF - 179.6 (4/6/17) --> 45.8 (12/11/17)  Anti-CCP - 115  Anti-Jo1 - neg  ANCA - neg     BAL 5/28/17  N56, L14, E4    No results found for this or any previous visit (from the past 168 hour(s)).           Cardiac:     Transthoracic echocardiogram (July 19, 2019)  Global and regional left ventricular function is normal with an EF of 60-65%.  Right ventricular function, chamber size, wall motion, and thickness are  normal.  The atrial septum is intact as assessed by color Doppler and agitated saline  bubble study.  Pulmonary artery systolic pressure cannot be assessed.  The inferior vena cava is normal.  No pericardial effusion is present.  *PA acceleration time reported as 130msec (normal to borderline)    Stress echo (March 22, 2017)  1. No ischemia at the cardiac workload achieved.  2. Left ventricular function normal at rest, improved with stress.  3. No ischemic ECG response with adequate heart rate.  4. The patient did not report angina with stress.  5. Exercise capacity was good .  6. The baseline echocardiogram revealed no significant valvular  abnormalities. There is focal non specific change of the aortic valve.  7. Normal blood pressure response with exercise.       Again, thank you for allowing me to participate in the care of your patient.      Sincerely,    Valery Lin MD

## 2019-07-19 NOTE — PROGRESS NOTES
[facility]  ILD Clinic - Return Visit -  July 19, 2019         Assessment and Plan:   Wilbert Hutchins is a 59 year old male with a history of ILD who presents for a return visit.     1) Interstitial lung disease  - His case was reviewed at our ILD conference.  His CT looks consistent with DIP or NSIP.  His spirometry today shows stable to slightly improved FVC.   - We talked about smoking cessation at length and discussed short and long-term consequences of smoking history.  He was offered nicotine replacement therapy or referral to a smoking cessation program, but he states that he knows he can quit on his own and would like to try. He has one more carton left of his cigarettes and states after that he will not buy any more cigarettes.   - TPMT level was checked on his last visit and was normal.   - He needs to stop smoking since there is concern for smoking associated ILD (DIP), however he does have positive RF and a chest imaging that has features of NSIP, and would meet criteria for IPAF as well.  He is borderline in terms of being a candidate for a surgical biopsy, and I would not recommend this at this point since I doubt it will change his treatment course.   - If his lung function deteriorate, I would consider starting another agent such as Azathioprine.     2) Arthralgias  - His hand films were reviewed during ILD conference; His joint spaces are preserved without any joint space narrowing or erosions.     2) Tobacco use  - As above    RTC: 2 months    Valery Lin MD  Pulmonary, Allergy, Critical Care and Sleep Medicine          Problem List:     1. Interstitial lung disease  a. HRCT (May 31, 2019) Indeterminate for UIP;   i. Peripheral reticulation, mild paraseptal emphysema, GGO and peripheral reticulation and fibrosis basilar predominant. Mediastinal lymphadenopathy.  No air trapping on expiratory views.  b. Pulm: Dr. Jack Machado  ([facility])  c. Rheum: Dr. Rg Deleon ([facility])  d. Treatment   i. On Prednisone since March 2018  ii. CellCept initiated in 2019 but could not tolerate due to exacerbation of arthralgias  iii. On Plaquinil   iv. Prednisone dose currently 11mg     2. Tobacco use    3. Migrating arthralgias  a. Positive RF (52) and Anti-CCP (76)  b. No erosive arthritis on right hand film (April 6, 2017)    4. Obesity (Body mass index is 32.38 kg/m .)          Interval History:     Wilbert Hutchins is a 59 year old male with ILD who presents for a return visit. He was last seen on May 31 by me.     Since his last visit, his respiratory status has been stable.  He has started pulmonary rehabilitation at [facility], and is walking on a treadmill at a speed of 1.9 mph for 10 minutes continuously.  In addition, he does NuStep at level of 2.2, and 110 pounds leg press.  He goes to pulmonary rehabilitation twice a week.  In terms of other exercises, he states that he walks his dog every day.    He continues to have migrating arthralgias.  He is currently on Prednisone down to 11mg daily.  He has a history of intolerance to CellCept due to worsening of his arthralgias.    Unfortunately, he is still smoking about a pack a day.  After contemplating quitting, he has decided that he was not ready.  He states that he enjoys doing this and there is no reason to quit.          Review of Systems:     Please see HPI, otherwise the complete 10 point ROS is negative.           Past Medical and Surgical History:     Past Medical History:   Diagnosis Date     Adjustment disorder with mixed disturbance of emotions and conduct 7/25/2007     Cigarette smoker      Hyperlipidemia LDL goal <160 2/22/2012     ILD (interstitial lung disease) (H) 5/31/2019     Marijuana use 5/31/2019     Overweight (BMI 25.0-29.9) 2/22/2012     Past Surgical History:   Procedure Laterality Date     COLONOSCOPY  3/12    normal     ENT  SURGERY       ORTHOPEDIC SURGERY             Family History:     Family History   Problem Relation Age of Onset     Hypertension Mother      Breast Cancer Mother      Alcohol/Drug Mother      Alcohol/Drug Brother      Diabetes Paternal Grandmother      Alcoholism Father      Cancer No family hx of      Cerebrovascular Disease No family hx of      Thyroid Disease No family hx of      Glaucoma No family hx of      Macular Degeneration No family hx of             Social History:     Social History     Socioeconomic History     Marital status: Single     Spouse name: Not on file     Number of children: Not on file     Years of education: Not on file     Highest education level: Not on file   Occupational History     Not on file   Social Needs     Financial resource strain: Not on file     Food insecurity:     Worry: Not on file     Inability: Not on file     Transportation needs:     Medical: Not on file     Non-medical: Not on file   Tobacco Use     Smoking status: Current Some Day Smoker     Packs/day: 0.75     Types: Cigarettes     Smokeless tobacco: Never Used     Tobacco comment: cutting way down   Substance and Sexual Activity     Alcohol use: No     Drug use: Yes     Sexual activity: Yes     Partners: Female   Lifestyle     Physical activity:     Days per week: Not on file     Minutes per session: Not on file     Stress: Not on file   Relationships     Social connections:     Talks on phone: Not on file     Gets together: Not on file     Attends Judaism service: Not on file     Active member of club or organization: Not on file     Attends meetings of clubs or organizations: Not on file     Relationship status: Not on file     Intimate partner violence:     Fear of current or ex partner: Not on file     Emotionally abused: Not on file     Physically abused: Not on file     Forced sexual activity: Not on file   Other Topics Concern     Parent/sibling w/ CABG, MI or angioplasty before 65F 55M? Not Asked   Social  "History Narrative    He is a  and was in the [employer].      He was on active duty and was in [place] in 1993 when a barrack he was in was blown up.        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    He denies any exposure to birds, but his partner uses a down pillow.  He denies any humidifiers hot tubs or steam sallowness.    He does have a pet pit bull.        He currently lives in [facility] [place] in a house that he has lived in for the past 9 years.  He denies any known water damage or mold.            Medications:     Current Outpatient Medications   Medication     albuterol (PROAIR HFA, PROVENTIL HFA, VENTOLIN HFA) 108 (90 BASE) MCG/ACT inhaler     BD INSULIN SYRINGE U/F 31G X 5/16\" 1 ML miscellaneous     cetirizine (ZYRTEC) 10 MG tablet     cholecalciferol (VITAMIN D-1000 MAX ST) 1000 units TABS     fluticasone (FLONASE) 50 MCG/ACT nasal spray     folic acid (FOLVITE) 1 MG tablet     gabapentin (NEURONTIN) 300 MG capsule     hydroxychloroquine (PLAQUENIL) 200 MG tablet     ibuprofen (IBU) 800 MG tablet     insulin NPH (NOVOLIN N VIAL) 100 UNIT/ML vial     insulin syringe-needle U-100 (ELITE-THIN INSULIN SYRINGE) 31G X 5/16\" 1 ML miscellaneous     order for DME     predniSONE (DELTASONE) 10 MG tablet     ranitidine (ZANTAC) 150 MG tablet     sertraline (ZOLOFT) 100 MG tablet     simvastatin (ZOCOR) 40 MG tablet     sulfamethoxazole-trimethoprim (BACTRIM/SEPTRA) 400-80 MG tablet     No current facility-administered medications for this visit.             Physical Exam:   /75   Pulse 51   Ht 1.778 m (5' 10\")   Wt 102.4 kg (225 lb 11.2 oz)   SpO2 94%   BMI 32.38 kg/m      GENERAL: alert, NAD  HEENT: NCAT, EOMI, no scleral icterus, oral mucosa moist and without lesions  Neck: no cervical or supraclavicular adenopathy  Lungs: inspiratory crackles  CV: RRR, S1S2, no murmurs noted  Abdomen: normoactive BS, soft, non " tender   Lymph: no edema  Neuro: AAO X 3  Psychiatric: normal affect, good eye contact  Skin: no rash, jaundice or lesions on limited exam  Extremities: No cyanosis, clubbing or edema. No digital edema, no synovitis or joint swelling.  No ulcers, skin thickening or fissures.           Imaging:     HRCT chest w/o contrast (May 31, 2019)  Peripheral reticulation, mild paraseptal emphysema, GGO and peripheral reticulation and fibrosis basilar predominant. Mediastinal lymphadenopathy.  No air trapping on expiratory views.           Pulmonary Function Tests:     Date TLC (%) FVC (%) FEV1 (%) FEV1/FVC DLCO (%)   3/22/2017 5.24 77 3.63 76 3.13 85 86 16.11 51   4/3/2019  4.84 71 3.30 70 2.81 78 85 13.52(unc) 43   5/31/2019 4.85 68 3.25 68 2.63 70 78 13.24 47    7/19/19     3.40 71 2.68 72 79 14.39  51     PFT interpretation (July 19, 2019):   Maneuver: valid and meets ATS guidelines  Spirometry: FVC and FEV1 are both reduced in a pattern to suggest mild restriction. Lung volumes needed to confirm.   Diffusinc capacity: There is moderate impairment in diffusing capacity, but is uncorrected for hemoglobin.     6-minute walk test (July 19, 2019)  He was able to ambulate for 1210 feet or 369 m in 6 minutes (LLN 1507 feet / 459 m).  There is no room air SPO2 done.  On 2 L/min of supplemental oxygen, his lowest SPO2 is 93%.  Compared to his prior study in May 31, his six minute walk distance has increased by 425 feet or 130 meters.          Laboratory:     Serology (May 31, 2019)  RALPH - neg  Anti-CCP - 76 (<7)  RF - 52 (<20)  SSA, SSB - neg  Anti-Scl-70 - neg  Anti-Jo1 - neg  ANCA - neg  CRP - 24.8 (<8)  ESR - 21 (<20)  Aldolase - 5.9  CK - 83    HP panel - neg    TPMP - 26 (low risk)    [facility]  RF - 179.6 (4/6/17) --> 45.8 (12/11/17)  Anti-CCP - 115  Anti-Jo1 - neg  ANCA - neg     BAL 5/28/17  N56, L14, E4    No results found for this or any previous visit (from the past 168 hour(s)).            Cardiac:     Transthoracic echocardiogram (July 19, 2019)  Global and regional left ventricular function is normal with an EF of 60-65%.  Right ventricular function, chamber size, wall motion, and thickness are  normal.  The atrial septum is intact as assessed by color Doppler and agitated saline  bubble study.  Pulmonary artery systolic pressure cannot be assessed.  The inferior vena cava is normal.  No pericardial effusion is present.  *PA acceleration time reported as 130msec (normal to borderline)    Stress echo (March 22, 2017)  1. No ischemia at the cardiac workload achieved.  2. Left ventricular function normal at rest, improved with stress.  3. No ischemic ECG response with adequate heart rate.  4. The patient did not report angina with stress.  5. Exercise capacity was good .  6. The baseline echocardiogram revealed no significant valvular  abnormalities. There is focal non specific change of the aortic valve.  7. Normal blood pressure response with exercise.

## 2019-07-22 ENCOUNTER — HOSPITAL ENCOUNTER (OUTPATIENT)
Dept: CARDIAC REHAB | Facility: CLINIC | Age: 59
End: 2019-07-22
Attending: INTERNAL MEDICINE
Payer: COMMERCIAL

## 2019-07-22 PROCEDURE — G0239 OTH RESP PROC, GROUP: HCPCS

## 2019-07-22 PROCEDURE — 40000244 ZZH STATISTIC VISIT PULM REHAB

## 2019-07-23 LAB
DLCOUNC-%PRED-PRE: 51 %
DLCOUNC-PRE: 14.39 ML/MIN/MMHG
DLCOUNC-PRED: 28.16 ML/MIN/MMHG
ERV-%PRED-PRE: 262 %
ERV-PRE: 1.68 L
ERV-PRED: 0.64 L
EXPTIME-PRE: 7.89 SEC
FEF2575-%PRED-PRE: 71 %
FEF2575-PRE: 2.26 L/SEC
FEF2575-PRED: 3.17 L/SEC
FEFMAX-%PRED-PRE: 89 %
FEFMAX-PRE: 8.43 L/SEC
FEFMAX-PRED: 9.43 L/SEC
FEV1-%PRED-PRE: 72 %
FEV1-PRE: 2.68 L
FEV1FEV6-PRE: 77 %
FEV1FEV6-PRED: 79 %
FEV1FVC-PRE: 79 %
FEV1FVC-PRED: 78 %
FEV1SVC-PRE: 79 %
FEV1SVC-PRED: 73 %
FIFMAX-PRE: 5.93 L/SEC
FVC-%PRED-PRE: 71 %
FVC-PRE: 3.4 L
FVC-PRED: 4.76 L
IC-%PRED-PRE: 39 %
IC-PRE: 1.73 L
IC-PRED: 4.42 L
VA-%PRED-PRE: 67 %
VA-PRE: 4.43 L
VC-%PRED-PRE: 67 %
VC-PRE: 3.42 L
VC-PRED: 5.06 L

## 2019-07-29 ENCOUNTER — HOSPITAL ENCOUNTER (OUTPATIENT)
Dept: CARDIAC REHAB | Facility: CLINIC | Age: 59
End: 2019-07-29
Attending: INTERNAL MEDICINE
Payer: COMMERCIAL

## 2019-07-29 PROCEDURE — G0239 OTH RESP PROC, GROUP: HCPCS

## 2019-07-29 PROCEDURE — 40000244 ZZH STATISTIC VISIT PULM REHAB

## 2019-07-31 ENCOUNTER — HOSPITAL ENCOUNTER (OUTPATIENT)
Dept: CARDIAC REHAB | Facility: CLINIC | Age: 59
End: 2019-07-31
Attending: INTERNAL MEDICINE
Payer: COMMERCIAL

## 2019-07-31 PROCEDURE — 40000244 ZZH STATISTIC VISIT PULM REHAB

## 2019-07-31 PROCEDURE — G0239 OTH RESP PROC, GROUP: HCPCS

## 2019-08-07 ENCOUNTER — HOSPITAL ENCOUNTER (OUTPATIENT)
Dept: CARDIAC REHAB | Facility: CLINIC | Age: 59
End: 2019-08-07
Attending: INTERNAL MEDICINE
Payer: COMMERCIAL

## 2019-08-07 PROCEDURE — G0239 OTH RESP PROC, GROUP: HCPCS

## 2019-08-07 PROCEDURE — 40000244 ZZH STATISTIC VISIT PULM REHAB

## 2019-08-12 ENCOUNTER — HOSPITAL ENCOUNTER (OUTPATIENT)
Dept: CARDIAC REHAB | Facility: CLINIC | Age: 59
End: 2019-08-12
Attending: INTERNAL MEDICINE
Payer: COMMERCIAL

## 2019-08-12 PROCEDURE — 40000244 ZZH STATISTIC VISIT PULM REHAB

## 2019-08-12 PROCEDURE — G0239 OTH RESP PROC, GROUP: HCPCS

## 2019-08-19 ENCOUNTER — HOSPITAL ENCOUNTER (OUTPATIENT)
Dept: CARDIAC REHAB | Facility: CLINIC | Age: 59
End: 2019-08-19
Attending: INTERNAL MEDICINE
Payer: COMMERCIAL

## 2019-08-19 PROCEDURE — G0239 OTH RESP PROC, GROUP: HCPCS

## 2019-08-19 PROCEDURE — 40000244 ZZH STATISTIC VISIT PULM REHAB

## 2019-08-21 ENCOUNTER — HOSPITAL ENCOUNTER (OUTPATIENT)
Dept: CARDIAC REHAB | Facility: CLINIC | Age: 59
End: 2019-08-21
Attending: INTERNAL MEDICINE
Payer: COMMERCIAL

## 2019-08-21 PROCEDURE — G0239 OTH RESP PROC, GROUP: HCPCS

## 2019-08-21 PROCEDURE — 40000244 ZZH STATISTIC VISIT PULM REHAB

## 2019-08-26 ENCOUNTER — HOSPITAL ENCOUNTER (OUTPATIENT)
Dept: CARDIAC REHAB | Facility: CLINIC | Age: 59
End: 2019-08-26
Attending: INTERNAL MEDICINE
Payer: COMMERCIAL

## 2019-08-26 PROCEDURE — G0239 OTH RESP PROC, GROUP: HCPCS

## 2019-08-26 PROCEDURE — 40000244 ZZH STATISTIC VISIT PULM REHAB

## 2019-08-28 ENCOUNTER — HOSPITAL ENCOUNTER (OUTPATIENT)
Dept: CARDIAC REHAB | Facility: CLINIC | Age: 59
End: 2019-08-28
Attending: INTERNAL MEDICINE
Payer: COMMERCIAL

## 2019-08-28 PROCEDURE — G0239 OTH RESP PROC, GROUP: HCPCS

## 2019-08-28 PROCEDURE — 40000244 ZZH STATISTIC VISIT PULM REHAB

## 2019-09-04 ENCOUNTER — HOSPITAL ENCOUNTER (OUTPATIENT)
Dept: CARDIAC REHAB | Facility: CLINIC | Age: 59
End: 2019-09-04
Attending: INTERNAL MEDICINE
Payer: COMMERCIAL

## 2019-09-04 ENCOUNTER — TRANSFERRED RECORDS (OUTPATIENT)
Dept: HEALTH INFORMATION MANAGEMENT | Facility: CLINIC | Age: 59
End: 2019-09-04

## 2019-09-04 PROCEDURE — 40000244 ZZH STATISTIC VISIT PULM REHAB

## 2019-09-04 PROCEDURE — G0239 OTH RESP PROC, GROUP: HCPCS

## 2019-09-11 ENCOUNTER — HOSPITAL ENCOUNTER (OUTPATIENT)
Dept: CARDIAC REHAB | Facility: CLINIC | Age: 59
End: 2019-09-11
Attending: INTERNAL MEDICINE
Payer: COMMERCIAL

## 2019-09-11 PROCEDURE — G0239 OTH RESP PROC, GROUP: HCPCS

## 2019-09-11 PROCEDURE — 40000244 ZZH STATISTIC VISIT PULM REHAB

## 2019-09-22 ASSESSMENT — ENCOUNTER SYMPTOMS
STIFFNESS: 1
JOINT SWELLING: 1
DYSPNEA ON EXERTION: 1
COUGH DISTURBING SLEEP: 0
ARTHRALGIAS: 1
SNORES LOUDLY: 0
BACK PAIN: 1
SHORTNESS OF BREATH: 1
COUGH: 1
HEMOPTYSIS: 0
SPUTUM PRODUCTION: 1
POSTURAL DYSPNEA: 1
MUSCLE WEAKNESS: 0
MUSCLE CRAMPS: 0
NECK PAIN: 1
MYALGIAS: 1
WHEEZING: 0

## 2019-09-23 ENCOUNTER — HOSPITAL ENCOUNTER (OUTPATIENT)
Dept: CARDIAC REHAB | Facility: CLINIC | Age: 59
End: 2019-09-23
Attending: INTERNAL MEDICINE
Payer: COMMERCIAL

## 2019-09-23 PROCEDURE — 40000244 ZZH STATISTIC VISIT PULM REHAB

## 2019-09-23 PROCEDURE — G0239 OTH RESP PROC, GROUP: HCPCS

## 2019-09-25 ENCOUNTER — OFFICE VISIT (OUTPATIENT)
Dept: PULMONOLOGY | Facility: CLINIC | Age: 59
End: 2019-09-25
Attending: INTERNAL MEDICINE
Payer: COMMERCIAL

## 2019-09-25 VITALS
RESPIRATION RATE: 17 BRPM | DIASTOLIC BLOOD PRESSURE: 97 MMHG | BODY MASS INDEX: 31.64 KG/M2 | SYSTOLIC BLOOD PRESSURE: 156 MMHG | HEIGHT: 70 IN | HEART RATE: 63 BPM | OXYGEN SATURATION: 95 % | WEIGHT: 221 LBS

## 2019-09-25 DIAGNOSIS — J84.9 ILD (INTERSTITIAL LUNG DISEASE) (H): Primary | ICD-10-CM

## 2019-09-25 DIAGNOSIS — J84.9 ILD (INTERSTITIAL LUNG DISEASE) (H): ICD-10-CM

## 2019-09-25 DIAGNOSIS — Z72.0 CURRENT TOBACCO USE: ICD-10-CM

## 2019-09-25 LAB
6 MIN WALK (FT): 1140 FT
6 MIN WALK (M): 347 M

## 2019-09-25 PROCEDURE — G0463 HOSPITAL OUTPT CLINIC VISIT: HCPCS | Mod: ZF

## 2019-09-25 ASSESSMENT — PAIN SCALES - GENERAL: PAINLEVEL: WORST PAIN (10)

## 2019-09-25 ASSESSMENT — MIFFLIN-ST. JEOR: SCORE: 1833.7
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
## 2019-09-25 NOTE — NURSING NOTE
Chief Complaint   Patient presents with     RECHECK     ILD/ CTD    Medications reviewed and vital signs taken.   Fany Matos, CMA

## 2019-09-28 ENCOUNTER — HEALTH MAINTENANCE LETTER (OUTPATIENT)
Age: 59
End: 2019-09-28

## 2019-09-30 ENCOUNTER — HOSPITAL ENCOUNTER (OUTPATIENT)
Dept: CARDIAC REHAB | Facility: CLINIC | Age: 59
End: 2019-09-30
Attending: INTERNAL MEDICINE
Payer: COMMERCIAL

## 2019-09-30 PROCEDURE — G0239 OTH RESP PROC, GROUP: HCPCS

## 2019-09-30 PROCEDURE — 40000244 ZZH STATISTIC VISIT PULM REHAB

## 2019-10-09 LAB
DLCOUNC-%PRED-PRE: 45 %
DLCOUNC-PRE: 12.73 ML/MIN/MMHG
DLCOUNC-PRED: 28.16 ML/MIN/MMHG
ERV-%PRED-PRE: 134 %
ERV-PRE: 1.36 L
ERV-PRED: 1.02 L
EXPTIME-PRE: 8.43 SEC
FEF2575-%PRED-PRE: 60 %
FEF2575-PRE: 1.91 L/SEC
FEF2575-PRED: 3.17 L/SEC
FEFMAX-%PRED-PRE: 72 %
FEFMAX-PRE: 6.86 L/SEC
FEFMAX-PRED: 9.43 L/SEC
FEV1-%PRED-PRE: 69 %
FEV1-PRE: 2.56 L
FEV1FEV6-PRE: 74 %
FEV1FEV6-PRED: 79 %
FEV1FVC-PRE: 74 %
FEV1FVC-PRED: 78 %
FEV1SVC-PRE: 74 %
FEV1SVC-PRED: 73 %
FIFMAX-PRE: 5.82 L/SEC
FRCPLETH-%PRED-PRE: 85 %
FRCPLETH-PRE: 3.06 L
FRCPLETH-PRED: 3.58 L
FVC-%PRED-PRE: 72 %
FVC-PRE: 3.44 L
FVC-PRED: 4.76 L
IC-%PRED-PRE: 51 %
IC-PRE: 2.08 L
IC-PRED: 4.04 L
RVPLETH-%PRED-PRE: 72 %
RVPLETH-PRE: 1.7 L
RVPLETH-PRED: 2.35 L
TLCPLETH-%PRED-PRE: 72 %
TLCPLETH-PRE: 5.14 L
TLCPLETH-PRED: 7.13 L
VA-%PRED-PRE: 67 %
VA-PRE: 4.45 L
VC-%PRED-PRE: 68 %
VC-PRE: 3.44 L
VC-PRED: 5.06 L

## 2019-11-22 NOTE — PROGRESS NOTES
Respiratory Therapy/Pulmonary Rehabilitation Discharge Summary    Reason for discharge:    Patient/family request discontinuation of services.  Patient experiencing pain and having some orthopedic issues that limit patient to tolerating exercise program. Patient plans to continue strength training exercises.   Progress towards goals:  Goals partially met.  Barriers to achieving goals:   limited tolerance for therapy.    Recommendation(s):    Continue home exercise program.         Physician cosignature/electronic signature indicates agreements with the ITP document and approval of discharge.

## 2019-12-15 ASSESSMENT — ENCOUNTER SYMPTOMS
POSTURAL DYSPNEA: 1
WHEEZING: 1
HEMOPTYSIS: 0
SPUTUM PRODUCTION: 1
SNORES LOUDLY: 0
COUGH DISTURBING SLEEP: 0
SHORTNESS OF BREATH: 1
DYSPNEA ON EXERTION: 1
COUGH: 1

## 2019-12-18 ENCOUNTER — OFFICE VISIT (OUTPATIENT)
Dept: PULMONOLOGY | Facility: CLINIC | Age: 59
End: 2019-12-18
Attending: INTERNAL MEDICINE
Payer: COMMERCIAL

## 2019-12-18 VITALS
OXYGEN SATURATION: 93 % | HEART RATE: 60 BPM | SYSTOLIC BLOOD PRESSURE: 136 MMHG | HEIGHT: 70 IN | BODY MASS INDEX: 31.64 KG/M2 | DIASTOLIC BLOOD PRESSURE: 86 MMHG | WEIGHT: 221 LBS | RESPIRATION RATE: 18 BRPM

## 2019-12-18 DIAGNOSIS — J84.9 ILD (INTERSTITIAL LUNG DISEASE) (H): Primary | ICD-10-CM

## 2019-12-18 DIAGNOSIS — Z72.0 CURRENT TOBACCO USE: ICD-10-CM

## 2019-12-18 DIAGNOSIS — J84.9 ILD (INTERSTITIAL LUNG DISEASE) (H): ICD-10-CM

## 2019-12-18 DIAGNOSIS — Z23 NEED FOR PNEUMOCOCCAL VACCINATION: ICD-10-CM

## 2019-12-18 PROCEDURE — 25000128 H RX IP 250 OP 636: Mod: ZF | Performed by: INTERNAL MEDICINE

## 2019-12-18 PROCEDURE — 90670 PCV13 VACCINE IM: CPT | Mod: ZF | Performed by: INTERNAL MEDICINE

## 2019-12-18 PROCEDURE — G0463 HOSPITAL OUTPT CLINIC VISIT: HCPCS | Mod: ZF

## 2019-12-18 PROCEDURE — G0009 ADMIN PNEUMOCOCCAL VACCINE: HCPCS | Mod: ZF

## 2019-12-18 RX ADMIN — PNEUMOCOCCAL 13-VALENT CONJUGATE VACCINE 0.5 ML: 2.2; 2.2; 2.2; 2.2; 2.2; 4.4; 2.2; 2.2; 2.2; 2.2; 2.2; 2.2; 2.2 INJECTION, SUSPENSION INTRAMUSCULAR at 12:30

## 2019-12-18 ASSESSMENT — PAIN SCALES - GENERAL: PAINLEVEL: MODERATE PAIN (4)

## 2019-12-18 ASSESSMENT — MIFFLIN-ST. JEOR: SCORE: 1833.7

## 2019-12-18 NOTE — NURSING NOTE
Given patient [id number] in right deltoid IM. Cleaned site with alcohol, and applied bandage. Pt tolerated injection well  Catherine Holder CMA
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
## 2019-12-18 NOTE — NURSING NOTE
Chief Complaint   Patient presents with     Interstitial Lung Disease (ILD)     Follow up      Medications reviewed and updated.  Vitals taken  Catherine Holder CMA
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
## 2019-12-19 LAB
DLCOUNC-%PRED-PRE: 49 %
DLCOUNC-PRE: 13.86 ML/MIN/MMHG
DLCOUNC-PRED: 28.16 ML/MIN/MMHG
ERV-%PRED-PRE: 137 %
ERV-PRE: 1.36 L
ERV-PRED: 0.99 L
EXPTIME-PRE: 7.09 SEC
FEF2575-%PRED-PRE: 62 %
FEF2575-PRE: 1.96 L/SEC
FEF2575-PRED: 3.17 L/SEC
FEFMAX-%PRED-PRE: 61 %
FEFMAX-PRE: 5.81 L/SEC
FEFMAX-PRED: 9.43 L/SEC
FEV1-%PRED-PRE: 68 %
FEV1-PRE: 2.52 L
FEV1FEV6-PRE: 76 %
FEV1FEV6-PRED: 79 %
FEV1FVC-PRE: 75 %
FEV1FVC-PRED: 78 %
FEV1SVC-PRE: 76 %
FEV1SVC-PRED: 73 %
FIFMAX-PRE: 6.03 L/SEC
FRCPLETH-%PRED-PRE: 92 %
FRCPLETH-PRE: 3.32 L
FRCPLETH-PRED: 3.58 L
FVC-%PRED-PRE: 70 %
FVC-PRE: 3.35 L
FVC-PRED: 4.76 L
IC-%PRED-PRE: 47 %
IC-PRE: 1.94 L
IC-PRED: 4.07 L
RVPLETH-%PRED-PRE: 83 %
RVPLETH-PRE: 1.96 L
RVPLETH-PRED: 2.35 L
TLCPLETH-%PRED-PRE: 73 %
TLCPLETH-PRE: 5.26 L
TLCPLETH-PRED: 7.13 L
VA-%PRED-PRE: 65 %
VA-PRE: 4.3 L
VC-%PRED-PRE: 65 %
VC-PRE: 3.3 L
VC-PRED: 5.06 L

## 2020-06-18 ENCOUNTER — VIRTUAL VISIT (OUTPATIENT)
Dept: PALLIATIVE CARE | Facility: CLINIC | Age: 60
End: 2020-06-18
Attending: INTERNAL MEDICINE
Payer: COMMERCIAL

## 2020-06-18 DIAGNOSIS — Z51.5 ENCOUNTER FOR PALLIATIVE CARE: ICD-10-CM

## 2020-06-18 DIAGNOSIS — J84.9 ILD (INTERSTITIAL LUNG DISEASE) (H): Primary | ICD-10-CM

## 2020-06-18 PROCEDURE — 40001009 ZZH VIDEO/TELEPHONE VISIT; NO CHARGE

## 2020-06-18 PROCEDURE — 99214 OFFICE O/P EST MOD 30 MIN: CPT | Mod: TEL | Performed by: STUDENT IN AN ORGANIZED HEALTH CARE EDUCATION/TRAINING PROGRAM

## 2020-06-18 ASSESSMENT — PATIENT HEALTH QUESTIONNAIRE - PHQ9: SUM OF ALL RESPONSES TO PHQ QUESTIONS 1-9: 16

## 2020-06-18 NOTE — PROGRESS NOTES
"Wilbert Hutchins is a 60 year old male who is being evaluated via a billable telephone visit.      The patient has been notified of following:     \"This telephone visit will be conducted via a call between you and your physician/provider. We have found that certain health care needs can be provided without the need for a physical exam.  This service lets us provide the care you need with a short phone conversation.  If a prescription is necessary we can send it directly to your pharmacy.  If lab work is needed we can place an order for that and you can then stop by our lab to have the test done at a later time.    Telephone visits are billed at different rates depending on your insurance coverage. During this emergency period, for some insurers they may be billed the same as an in-person visit.  Please reach out to your insurance provider with any questions.    If during the course of the call the physician/provider feels a telephone visit is not appropriate, you will not be charged for this service.\"    Patient has given verbal consent for Telephone visit?  Yes    What phone number would you like to be contacted at? [phone]    How would you like to obtain your AVS? Leonel    I have reviewed and updated the patient's allergies and medication list.     Concerns: he is new to this, recommended by his other provider      Claudia Tipton LPN      Phone call duration: 45 minutes    Jack Lewis MD      "

## 2020-06-18 NOTE — PROGRESS NOTES
"Palliative Care Outpatient Clinic Consultation Note    Patient:  Wilbert Hutchins    Chief Complaint:   Wilbert Hutchins 60 year old male who is presenting to the palliative medicine clinic today at the request of Dr. Lin for a palliative care consultation secondary to \"ILD\".       The patient's primary care provider is:  Michelle Tipton.     History of Present Illness:  Patient is a 60 year old male with a past medical history of ILD, Migrating arthralgias Polymyalgia rheumatica, HTN, PTSD, Depression, active tobacco and marijuana use.     Follows with rheumatology at [facility], broad workup for possible autoimmune process driving intermittent CMC joint swelling and tenderness largely negative ( Anti Jo1, RALPH, Anti SSA/SSB, Anti scl 70, IgG4).     Patient reports having ongoing conversations with his [facility] care coordinator who recommended further discussion with palliative care regarding symptoms and planning for the future.  ILD following pulmonology at [facility], on home Oxygen.     Patient continues to be a pack a day smoker and daily mariajuana smoker.  He understands that the smoking is not recommended, but says that it is \"exercising his lungs with smoking\" and also limites his home oxygen use most notably at night because he thinks it will strengthen his lungs.  Reports he is currently on 10 mg on prednisone and wants to keep this dose long term.     He has had limited mobility from his foot injury has limited his overall health, but using his scotter and boot has help slightly.  Saw podiatrist on 10/16 resulting in dx for chronic tear of plantar fascia and left ankle instability. Now wearing LLE boot at all times.  This continues to inhibit limit his ability to leave his home, secondary to his pulmonary intolerance. Putting boot causes a dramatic oxygen level drops.  Difficult to breath appears to mostly occurring during activity, severe shortness of breathing with less than one block " talking.    Reports that his mental health is relatively good right now, takes with therapists, on medications and no suicide ideations, previous attempts.    Patient's Disease Understanding: Patient appears to have a basic understanding of his pulmonary condition. Details regarding his overall care plan, including reasons for need for supplemental oxygen were limited.    Coping: Patient appears to be coping moderately well with his overall condition, but several times during our initial conversation he addressed concerns about difficulties with coping in the past with his mental health and concerns that things may be difficult as his functioning likely will worsen over the coming years.    Social History:  Living Situation: Patient currently living with his significant other for 18 years.  Children: Patient has 5 children including, ranging from mid 20s to 30s and age.  Actual/Potential Caregiver(s): Significant other Radha.  Support System: Patient significant other and many friends.  Occupation: Previous  service  Hobbies: Spending time outside  Substance Use/History of misuse: Daily use of nonmedical cannabis.  Denies recent use of any other recreational or illicit drugs, sober since 2008  Financial Concerns: Reports that his budget is tight but does not feel like he has any significant financial concerns.  Spiritual Background: Restorationism, not formally attending Sikhism    Social History     Tobacco Use     Smoking status: Current Some Day Smoker     Packs/day: 0.75     Types: Cigarettes     Smokeless tobacco: Never Used     Tobacco comment: cutting way down   Substance Use Topics     Alcohol use: No     Drug use: Yes     Family History:  Family History   Problem Relation Age of Onset     Hypertension Mother      Breast Cancer Mother      Alcohol/Drug Mother      Alcohol/Drug Brother      Diabetes Paternal Grandmother      Alcoholism Father      Cancer No family hx of      Cerebrovascular Disease No  "family hx of      Thyroid Disease No family hx of      Glaucoma No family hx of      Macular Degeneration No family hx of      Patient's Involvement with Prior History of Serious Illness in Family: Several family members including siblings that have passed away in their 40s.    Advance Care Planning:  Advance Directive: Patient currently does not have advanced directive on file.  Where is written copy located: Discussed completing copy and returning it to clinic next time he is in for pulmonary assessment.  Health Care Agent Contact Information: Patient identifies his partner Myra MEJIA: No pulsed completed or on file    No Known Allergies  Current Outpatient Medications   Medication Sig Dispense Refill     albuterol (PROAIR HFA, PROVENTIL HFA, VENTOLIN HFA) 108 (90 BASE) MCG/ACT inhaler Inhale 2 puffs into the lungs every 6 hours as needed for shortness of breath / dyspnea 1 Inhaler 5     BD INSULIN SYRINGE U/F 31G X 5/16\" 1 ML miscellaneous   11     cetirizine (ZYRTEC) 10 MG tablet Take 10 mg by mouth       cholecalciferol (VITAMIN D-1000 MAX ST) 1000 units TABS Take 1,000 Units by mouth       cholecalciferol 1000 units TABS Take 1,000 Units by mouth daily       fluticasone (FLONASE) 50 MCG/ACT nasal spray Spray 1 spray in nostril       folic acid (FOLVITE) 1 MG tablet Take 1 mg by mouth       gabapentin (NEURONTIN) 300 MG capsule Take 600 mg by mouth       hydroxychloroquine (PLAQUENIL) 200 MG tablet Take 200 mg by mouth       ibuprofen (IBU) 800 MG tablet TAKE ONE TABLET BY MOUTH FOUR TIMES DAILY AS NEEDED FOR PAIN       insulin NPH (NOVOLIN N VIAL) 100 UNIT/ML vial Inject 10 Units Subcutaneous       insulin syringe-needle U-100 (ELITE-THIN INSULIN SYRINGE) 31G X 5/16\" 1 ML miscellaneous Use to inject insulin as directed.       order for DME Upadated oxygen: Patient requires supplemental Oxygen 2 LPM via nasal canula with activity and nocturnally. Oxygen will be for a lifetime. 1 Device 0     predniSONE " "(DELTASONE) 10 MG tablet Take 13 mg by mouth       ranitidine (ZANTAC) 150 MG tablet   3     sertraline (ZOLOFT) 100 MG tablet Take 200 mg by mouth       simvastatin (ZOCOR) 40 MG tablet   3     sulfamethoxazole-trimethoprim (BACTRIM/SEPTRA) 400-80 MG tablet Take 1 tablet by mouth       Past Medical History:   Diagnosis Date     Adjustment disorder with mixed disturbance of emotions and conduct 7/25/2007     Cigarette smoker      Hyperlipidemia LDL goal <160 2/22/2012     ILD (interstitial lung disease) (H) 5/31/2019     Marijuana use 5/31/2019     Overweight (BMI 25.0-29.9) 2/22/2012     Past Surgical History:   Procedure Laterality Date     COLONOSCOPY  3/12    normal     ENT SURGERY       ORTHOPEDIC SURGERY       Review of Systems:  ROS: 10 point ROS neg other than the symptoms noted above in the HPI and here:  Palliative Symptom Review (0=no symptom/no concern, 1=mild, 2=moderate, 3=severe):      Pain: 0      Fatigue: 1      Nausea: 0      Constipation: 0      Diarrhea: 0      Depressive Symptoms: 1      Anxiety: 1      Drowsiness: 1      Poor Appetite: 0      Shortness of Breath: 2      Insomnia: 1      Overall (0 good/no concerns, 3 very poor):  1-2    Physical Exam:  Appointment was a telemedicine appointment.  Constitutional: Patient speech comfortable and pleasant no clear signs of distress.   Respiratory: Talk in full sentience, normal work of breathing   Neurological: Alert and orientated x 3, normal speech pattern    Psychological: Appropriate mood, slightly anxious     Data Reviewed:  LABS: No recent labs, PFTs last completed on 12/18/2019    IMAGING: No recent imaging, personally reviewed ECHO from 07/19/19 \"Global and regional left ventricular function is normal with an EF of 60-65%.  Right ventricular function, chamber size, wall motion, and thickness are  Normal.\"    Impressions:  Palliative Performance Score: 60%      Decision Making Capacity: Yes    Impression & Recommendations & " Counseling:  Wilbert Hutchins 60 year old male seen today via telemedicine visit to establish care with palliative care.    # Interstitial lung disease:    # Dyspnea:  Appears relatively stable PFTs over the last few years.  Shortness of breath relatively stable but overall functional capacity reduced with recent LE injury,  encouraged patient to use Oxygen as instructed. Not currently a candidate for opioid management of dyspnea, discussed possible use in the future.  - Follow up with pulmonary for timing of follow up PFTs and testing  - Discussed variable prognosis of ILD and that with good management we would expect that he could live for several years.      # History of PTSD and depression: Appears stable on current dose of Zoloft and actively meets with his therapist.    # Advance Care Planning:  - Instructed to bring copy of advance directive to clinic with his partner Myra identified as his healthcare agent    Patient was seen and staffed by Dr. Oliveira    Plan to return to clinic in 6 months to assess his status and symptom burden    Jack Lewis MD, A  Palliative Care Fellow PGY-4  [facility]  Pager: [phone]    Attending attestation:   I was present for the duration of the call and I agree with findings/recs in this note.   45 minutes spent on phone call. Telephone used due to patient unable to do video visit.  Has had slow progression of ILD over past 3 years, introduced palliative care today.  Has completed HCD which we encouraged him to bring in.    Thank you for involving us in the patient's care.   Jackie Oliveira MD / Palliative Medicine / Pager [phone] / After-Hours Answering Service [phone] / Main Palliative Clinic - [facility] [phone] / [facility] Inpatient Team Consult Pager [phone] (answered 8am-430pm M-F)

## 2020-06-18 NOTE — PROGRESS NOTES
"Palliative Care Outpatient Clinic Consultation Note    Patient:  Wilbert Hutchins    Chief Complaint:   Wilbert Hutchins 60 year old male who is presenting to the palliative medicine clinic today at the request of Dr. Lin for a palliative care consultation secondary to \"ILD\".       The patient's primary care provider is:  Michelle Tipton.     History of Present Illness:  ***    Recent communications, feel like it has been a gradual decline, increased cough over the past month, clear production, denies fevers. Currently has to wear a boot for foot problem and putting it on is where he see the dramatic oxygen level drops      Patient's Disease Understanding: ***    Coping:  ***    Social History:  Living Situation: ***  Children: ***  Actual/Potential Caregiver(s): ***  Support System: ***  Occupation: ***  Hobbies: ***  Substance Use/History of misuse: ***  Financial Concerns: ***  Spiritual Background: ***  Spiritual Concerns/Needs: ***    Social History     Tobacco Use     Smoking status: Current Some Day Smoker     Packs/day: 0.75     Types: Cigarettes     Smokeless tobacco: Never Used     Tobacco comment: cutting way down   Substance Use Topics     Alcohol use: No     Drug use: Yes       Family History:  Family History   Problem Relation Age of Onset     Hypertension Mother      Breast Cancer Mother      Alcohol/Drug Mother      Alcohol/Drug Brother      Diabetes Paternal Grandmother      Alcoholism Father      Cancer No family hx of      Cerebrovascular Disease No family hx of      Thyroid Disease No family hx of      Glaucoma No family hx of      Macular Degeneration No family hx of      Patient's Involvement with Prior History of Serious Illness in Family: ***    Advance Care Planning:  Advance Directive:    ***  Where is written copy located: ***  Health Care Agent Contact Information: ***  POLST:   ***    No Known Allergies  Current Outpatient Medications   Medication Sig Dispense Refill     " "albuterol (PROAIR HFA, PROVENTIL HFA, VENTOLIN HFA) 108 (90 BASE) MCG/ACT inhaler Inhale 2 puffs into the lungs every 6 hours as needed for shortness of breath / dyspnea 1 Inhaler 5     BD INSULIN SYRINGE U/F 31G X 5/16\" 1 ML miscellaneous   11     cetirizine (ZYRTEC) 10 MG tablet Take 10 mg by mouth       cholecalciferol (VITAMIN D-1000 MAX ST) 1000 units TABS Take 1,000 Units by mouth       cholecalciferol 1000 units TABS Take 1,000 Units by mouth daily       fluticasone (FLONASE) 50 MCG/ACT nasal spray Spray 1 spray in nostril       folic acid (FOLVITE) 1 MG tablet Take 1 mg by mouth       gabapentin (NEURONTIN) 300 MG capsule Take 600 mg by mouth       hydroxychloroquine (PLAQUENIL) 200 MG tablet Take 200 mg by mouth       ibuprofen (IBU) 800 MG tablet TAKE ONE TABLET BY MOUTH FOUR TIMES DAILY AS NEEDED FOR PAIN       insulin NPH (NOVOLIN N VIAL) 100 UNIT/ML vial Inject 10 Units Subcutaneous       insulin syringe-needle U-100 (ELITE-THIN INSULIN SYRINGE) 31G X 5/16\" 1 ML miscellaneous Use to inject insulin as directed.       order for DME Upadated oxygen: Patient requires supplemental Oxygen 2 LPM via nasal canula with activity and nocturnally. Oxygen will be for a lifetime. 1 Device 0     predniSONE (DELTASONE) 10 MG tablet Take 13 mg by mouth       ranitidine (ZANTAC) 150 MG tablet   3     sertraline (ZOLOFT) 100 MG tablet Take 200 mg by mouth       simvastatin (ZOCOR) 40 MG tablet   3     sulfamethoxazole-trimethoprim (BACTRIM/SEPTRA) 400-80 MG tablet Take 1 tablet by mouth       Past Medical History:   Diagnosis Date     Adjustment disorder with mixed disturbance of emotions and conduct 7/25/2007     Cigarette smoker      Hyperlipidemia LDL goal <160 2/22/2012     ILD (interstitial lung disease) (H) 5/31/2019     Marijuana use 5/31/2019     Overweight (BMI 25.0-29.9) 2/22/2012     Past Surgical History:   Procedure Laterality Date     COLONOSCOPY  3/12    normal     ENT SURGERY       ORTHOPEDIC SURGERY   "       Review of Systems:  ROS: 10 point ROS neg other than the symptoms noted above in the HPI and here:  Palliative Symptom Review (0=no symptom/no concern, 1=mild, 2=moderate, 3=severe):      Pain: ***      Fatigue: ***      Nausea: ***      Constipation: ***      Diarrhea: ***      Depressive Symptoms: ***      Anxiety: ***      Drowsiness: ***      Poor Appetite: ***      Shortness of Breath: ***      Insomnia: ***      Overall (0 good/no concerns, 3 very poor):  ***    Physical Exam:  There were no vitals taken for this visit.  Constitutional: No acute distress, comfortable, pleasant   HEENT: Anicteric, normal extra-ocular movements, MMM, throat with no lesions   Cardiovascular: RRR, no murmurs  Respiratory: CTA, no wheezes or crackles, normal work of breathing   Gastrointestinal: Non-tender, Non-distended, + bowel sounds  Musculoskeletal: Full ROM, no LE edema   Skin: no concerning lesions or rashes on exposed skin, no jaundice   Neurological: Alert and orientated x 3, cranial nerves grossly intact, normal gait, no tremor   Psychological: Appropriate mood     Data Reviewed:  LABS: ***    IMAGING: ***    Impressions:  Palliative Performance Score:  ***      Decision Making Capacity:  ***    Impression & Recommendations & Counseling:  Wilbert Hutchins 60 year old male was seen today in Palliative care clinic ***    # ***    # Advance Care Planning:    Patient was seen and staffed by Dr. Tee Lewis MD, A  Palliative Care Fellow PGY-4  [facility]  Pager: [phone]

## 2020-11-05 ASSESSMENT — ENCOUNTER SYMPTOMS
WHEEZING: 1
POSTURAL DYSPNEA: 1
SHORTNESS OF BREATH: 1
SNORES LOUDLY: 0
DYSPNEA ON EXERTION: 1
HEMOPTYSIS: 0
COUGH DISTURBING SLEEP: 1
COUGH: 1
SPUTUM PRODUCTION: 1

## 2020-11-11 ENCOUNTER — VIRTUAL VISIT (OUTPATIENT)
Dept: PALLIATIVE CARE | Facility: CLINIC | Age: 60
End: 2020-11-11
Attending: INTERNAL MEDICINE
Payer: COMMERCIAL

## 2020-11-11 DIAGNOSIS — R06.09 DYSPNEA ON EXERTION: ICD-10-CM

## 2020-11-11 DIAGNOSIS — J84.9 ILD (INTERSTITIAL LUNG DISEASE) (H): Primary | ICD-10-CM

## 2020-11-11 DIAGNOSIS — Z51.5 ENCOUNTER FOR PALLIATIVE CARE: ICD-10-CM

## 2020-11-11 PROCEDURE — 999N001193 HC VIDEO/TELEPHONE VISIT; NO CHARGE

## 2020-11-11 PROCEDURE — 99215 OFFICE O/P EST HI 40 MIN: CPT | Mod: TEL | Performed by: INTERNAL MEDICINE

## 2020-11-11 NOTE — LETTER
"11/11/2020       RE: Wilbert Hutchins  [address]  [facility] [address]     Dear Colleague,    Thank you for referring your patient, Wilbert Hutchins, to the [facility] at [facility]. Please see a copy of my visit note below.    Wilbert Hutchins is a 60 year old male who is being evaluated via a billable telephone visit.      The patient has been notified of following:     \"This telephone visit will be conducted via a call between you and your physician/provider. We have found that certain health care needs can be provided without the need for a physical exam.  This service lets us provide the care you need with a short phone conversation.  If a prescription is necessary we can send it directly to your pharmacy.  If lab work is needed we can place an order for that and you can then stop by our lab to have the test done at a later time.    Telephone visits are billed at different rates depending on your insurance coverage. During this emergency period, for some insurers they may be billed the same as an in-person visit.  Please reach out to your insurance provider with any questions.    If during the course of the call the physician/provider feels a telephone visit is not appropriate, you will not be charged for this service.\"    Patient has given verbal consent for Telephone visit?  Yes    What phone number would you like to be contacted at? [phone]    How would you like to obtain your AVS? [email or web address]    I have reviewed and updated the patient's allergies and medication list.    Concerns: No new concerns.   Refills: None needed.      Vitals - Patient Reported  Weight (Patient Reported): 99.8 kg (220 lb)  Height (Patient Reported): 177.8 cm (5' 10\")  BMI (Based on Pt Reported Ht/Wt): 31.57  Pain Score: No Pain (0)    Marah Duncan CMA    Palliative Care Outpatient Clinic Progress Note    Patient Name:  Wilbert SHELLEY" Cuong  Primary Provider:  Michelle Tipton    Chief Complaint/Patient ID:  Wilbert Hutchins is a 59yo M with a history of ILD on O2  Limited mobility due to foot injury and overall health     Last Palliative Care Appointment:    Interim History:  Wilbert Hutchins 60 year old male returns to be seen by palliative care today.      Since the summer, says he's doing alright.  No hospitalizations.  Says he feels worn out and tired all the time.  Sore in his shoulders, knees, ankles all the time - has difficulty raising arms to level of chest.  Seeing a rheumatologist - has follow-up later this month, in on HCQ and prednisone.  Breathing a little worse - says he feels worn out with any activity, using 3L, oxygen level will drop to 70s with oxygen on.  Has a scooter to get out of the house, go back and forth from garage to house. No falls. Has follow-up with pulmonologist, Dr. Lin on 11/19, with repeat studies.  Feels anxious wondering what is happening with his lungs. When he compares to how he felt last year, notices big changes.  Last year could shovel snow, can only push about 30 feet then has to stop.  Able to do his ADLs independently, has some short of breath with these. Difficult to put on socks.  Pretty much house-bound.     Mostly in garage w a kerosene heater so he can smoke, because he doesn't smoke in the house.  Has been smoking more, cigarettes and a gram of pot/day. Says he doesn't feel necessarily more because he's anxious, but he sits and plays a casino game continuously with people all over the world.  Feels as addicted to it as if he were actually gambling. Says keeps his mind from thinking about too much.     Worries about being smothered, was trapped in a mattress for about 4 hours and that is a big fear of his.      Appetite low - has about 1 meal and then snacks most of the afternoon.  Weight has been stable.      Coping:  Says mood is so-so.  Not the greatest. Notices more  "impatience.  .  Having phone visits with counselor.  Feels the pot keeps him even, settled, and calm and he is not interested in stopping.     Social History:  Pertinent changes to social history/social situation since last visit: None  Lives with ALETHA Prado, together 18 years  Patient has 5 children including, ranging from mid 20s to 30s and age.  Previous  service   Daily use of nonmedical cannabis, has been using since age 18, cigarettes.  Denies recent use of any other recreational or illicit drugs, sober since 2008  Faith, not formally attending Buddhism  Advance Directive Status:  Has not completed, would want partner Myra to make medical decisions  Social History     Tobacco Use     Smoking status: Current Some Day Smoker     Packs/day: 0.75     Types: Cigarettes     Smokeless tobacco: Never Used     Tobacco comment: cutting way down   Substance Use Topics     Alcohol use: No     Drug use: Yes     No Known Allergies     Current Outpatient Medications   Medication Sig Dispense Refill     albuterol (PROAIR HFA, PROVENTIL HFA, VENTOLIN HFA) 108 (90 BASE) MCG/ACT inhaler Inhale 2 puffs into the lungs every 6 hours as needed for shortness of breath / dyspnea 1 Inhaler 5     BD INSULIN SYRINGE U/F 31G X 5/16\" 1 ML miscellaneous   11     cetirizine (ZYRTEC) 10 MG tablet Take 10 mg by mouth       cholecalciferol (VITAMIN D-1000 MAX ST) 1000 units TABS Take 1,000 Units by mouth       cholecalciferol 1000 units TABS Take 1,000 Units by mouth daily       fluticasone (FLONASE) 50 MCG/ACT nasal spray Spray 1 spray in nostril       folic acid (FOLVITE) 1 MG tablet Take 1 mg by mouth       gabapentin (NEURONTIN) 300 MG capsule Take 600 mg by mouth       hydroxychloroquine (PLAQUENIL) 200 MG tablet Take 200 mg by mouth       ibuprofen (IBU) 800 MG tablet TAKE ONE TABLET BY MOUTH FOUR TIMES DAILY AS NEEDED FOR PAIN       insulin NPH (NOVOLIN N VIAL) 100 UNIT/ML vial Inject 10 Units Subcutaneous       insulin " "syringe-needle U-100 (ELITE-THIN INSULIN SYRINGE) 31G X 5/16\" 1 ML miscellaneous Use to inject insulin as directed.       order for DME Upadated oxygen: Patient requires supplemental Oxygen 2 LPM via nasal canula with activity and nocturnally. Oxygen will be for a lifetime. 1 Device 0     predniSONE (DELTASONE) 10 MG tablet Take 13 mg by mouth       ranitidine (ZANTAC) 150 MG tablet   3     sertraline (ZOLOFT) 100 MG tablet Take 200 mg by mouth       simvastatin (ZOCOR) 40 MG tablet   3     sulfamethoxazole-trimethoprim (BACTRIM/SEPTRA) 400-80 MG tablet Take 1 tablet by mouth       Past Medical History:   Diagnosis Date     Adjustment disorder with mixed disturbance of emotions and conduct 7/25/2007     Cigarette smoker      Hyperlipidemia LDL goal <160 2/22/2012     ILD (interstitial lung disease) (H) 5/31/2019     Marijuana use 5/31/2019     Overweight (BMI 25.0-29.9) 2/22/2012     Past Surgical History:   Procedure Laterality Date     COLONOSCOPY  3/12    normal     ENT SURGERY       ORTHOPEDIC SURGERY       Family History   Problem Relation Age of Onset     Hypertension Mother      Breast Cancer Mother      Alcohol/Drug Mother      Alcohol/Drug Brother      Diabetes Paternal Grandmother      Alcoholism Father      Cancer No family hx of      Cerebrovascular Disease No family hx of      Thyroid Disease No family hx of      Glaucoma No family hx of      Macular Degeneration No family hx of        Review of Systems:   ROS: 10 point ROS neg other than the symptoms noted above in the HPI and pertinents here:  Palliative Symptom Review (0=no symptom/no concern, 1=mild, 2=moderate, 3=severe):      Pain: 1      Fatigue: 3      Depressive Symptoms: 1      Anxiety: 1      Drowsiness: 0      Poor Appetite: 1      Shortness of Breath: 2-3      Other: 0      Overall (0 good/no concerns, 3 very poor):  1    No physical exam due to telephone visit.  He was alert and engaged on the phone.  He was able to speak in full sentences " without significant difficulty.    Wt Readings from Last 10 Encounters:   12/18/19 100.2 kg (221 lb)   09/25/19 100.2 kg (221 lb)   07/19/19 102.4 kg (225 lb 11.2 oz)   05/31/19 104.3 kg (230 lb)   01/12/15 93.4 kg (206 lb)   01/08/15 94 kg (207 lb 3.2 oz)   12/11/13 87.7 kg (193 lb 6.4 oz)   11/26/13 85.7 kg (189 lb)   09/05/13 85.4 kg (188 lb 3.2 oz)   08/29/13 86.2 kg (190 lb)     Key Data Reviewed:  None new       Impression & Recommendations & Counseling:  Wilbert Hutchins 60 year old male seen today via telemedicine visit for follow-up with palliative care     # Interstitial lung disease:    # Dyspnea:  Has had functional decline over the last year, now mostly sitting through the day and with dyspnea while performing ADLs.  Feels relatively comfortable when he is sitting still.  Weight has been stable.  Given his significant dyspnea on mild exertion and hypoxia, with the fact that he is not interested in stopping smoking pot of cigarettes, he knows he would not be a transplant candidate.  I am not sure what else from pulmonology standpoint would be possible to help his ILD, and if there were not further interventions I think he is close to  considering morphine for symptom relief.  He describes feelings of panic when he can't catch his breath, which can happen 3 or 4 times/day.   -   We discussed this at length today, and generally I would recommend this were he to have dyspnea at rest or with mild exertion to improve quality of life.  He is very open about his past with alcoholism, several DUIs, and voices good insight and concern about his ability to manage opioids.  We discussed balancing the benefits of morphine for dyspnea control with the risk of harm due to his addiction history.  He voices that he feels he is close to end-of-life and would consider comfort focused care at some point in the future if there was nothing left to help his lungs.  I don't feel he'd qualify for hospice, but did briefly  mention this as a possibility in the future were he to require care at home.   - Given his high risk for harm from opioids,  if we were to start this, I would prescribe small amount at a time and recommend taking on a schedule such as a small dose of morphine twice a day with a limited amount prescribed and close monitoring.   - Due to public health emergency and GERTRUDE guidance, if we were to decide to try opioids, would need an in-person or video visit prior to initiation.   - Will continue to monitor for now.      # History of PTSD and depression: Appears stable on current dose of Zoloft and actively meets with his therapist.  Declines adjustment in medications.      # Advance Care Planning:  - Instructed to bring copy of advance directive to clinic with his partner Myra identified as his healthcare agent    (This note was transcribed using voice recognition software. While I review and edit the transcription, I may miss errors, and the software sometimes does unexpected capitalizations and formatting that I miss. Please let me know of any serious mistranscriptions and I will addend this note.)    Phone call duration: 41 minutes. 9:53-10:34    RTC in 2 months    Jackie Oliveira MD  Palliative Medicine  Pager [phone]

## 2020-11-11 NOTE — PATIENT INSTRUCTIONS
Thank you for coming into the Palliative Care Clinic today.      1. We will not change medications today    Return in clinic in 2 months for a follow-up.      You can reach the Palliative Care Team during business hours at the following numbers:   -For the [facility] and [facility], call [phone]  -To reach the palliative RN for questions or refills, call [phone]    To reach the Palliative Care Provider on-call After-hours or on holidays and weekends, call: [phone].  Please note that we are not able to provide pain medication refills on evenings or weekends.
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
## 2020-11-19 ENCOUNTER — OFFICE VISIT (OUTPATIENT)
Dept: PULMONOLOGY | Facility: CLINIC | Age: 60
End: 2020-11-19
Attending: INTERNAL MEDICINE
Payer: COMMERCIAL

## 2020-11-19 ENCOUNTER — ANCILLARY PROCEDURE (OUTPATIENT)
Dept: CT IMAGING | Facility: CLINIC | Age: 60
End: 2020-11-19
Attending: INTERNAL MEDICINE
Payer: COMMERCIAL

## 2020-11-19 VITALS
HEART RATE: 54 BPM | BODY MASS INDEX: 31.35 KG/M2 | SYSTOLIC BLOOD PRESSURE: 134 MMHG | HEIGHT: 70 IN | WEIGHT: 219 LBS | DIASTOLIC BLOOD PRESSURE: 82 MMHG | OXYGEN SATURATION: 99 %

## 2020-11-19 DIAGNOSIS — R06.02 SHORTNESS OF BREATH: Primary | ICD-10-CM

## 2020-11-19 DIAGNOSIS — J84.9 ILD (INTERSTITIAL LUNG DISEASE) (H): ICD-10-CM

## 2020-11-19 DIAGNOSIS — J84.9 ILD (INTERSTITIAL LUNG DISEASE) (H): Primary | ICD-10-CM

## 2020-11-19 PROCEDURE — 94729 DIFFUSING CAPACITY: CPT | Performed by: INTERNAL MEDICINE

## 2020-11-19 PROCEDURE — 99214 OFFICE O/P EST MOD 30 MIN: CPT | Mod: 25 | Performed by: INTERNAL MEDICINE

## 2020-11-19 PROCEDURE — 71250 CT THORAX DX C-: CPT | Mod: GC | Performed by: RADIOLOGY

## 2020-11-19 PROCEDURE — 94375 RESPIRATORY FLOW VOLUME LOOP: CPT | Performed by: INTERNAL MEDICINE

## 2020-11-19 PROCEDURE — 94726 PLETHYSMOGRAPHY LUNG VOLUMES: CPT | Performed by: INTERNAL MEDICINE

## 2020-11-19 ASSESSMENT — MIFFLIN-ST. JEOR: SCORE: 1819.63

## 2020-11-19 NOTE — NURSING NOTE
Chief Complaint   Patient presents with     Follow Up     ILD (interstitial lung disease)     Vitals were taken and medications were reconciled.     KATE Inman
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
## 2020-11-20 LAB
DLCOUNC-%PRED-PRE: 42 %
DLCOUNC-PRE: 11.92 ML/MIN/MMHG
DLCOUNC-PRED: 27.98 ML/MIN/MMHG
ERV-%PRED-PRE: 139 %
ERV-PRE: 1.42 L
ERV-PRED: 1.02 L
EXPTIME-PRE: 4.92 SEC
FEF2575-%PRED-PRE: 80 %
FEF2575-PRE: 2.49 L/SEC
FEF2575-PRED: 3.11 L/SEC
FEFMAX-%PRED-PRE: 68 %
FEFMAX-PRE: 6.44 L/SEC
FEFMAX-PRED: 9.36 L/SEC
FEV1-%PRED-PRE: 72 %
FEV1-PRE: 2.67 L
FEV1FEV6-PRE: 82 %
FEV1FEV6-PRED: 79 %
FEV1FVC-PRE: 81 %
FEV1FVC-PRED: 78 %
FEV1SVC-PRE: 76 %
FEV1SVC-PRED: 73 %
FIFMAX-PRE: 6.59 L/SEC
FRCPLETH-%PRED-PRE: 89 %
FRCPLETH-PRE: 3.22 L
FRCPLETH-PRED: 3.59 L
FVC-%PRED-PRE: 70 %
FVC-PRE: 3.31 L
FVC-PRED: 4.72 L
IC-%PRED-PRE: 52 %
IC-PRE: 2.11 L
IC-PRED: 4.02 L
RVPLETH-%PRED-PRE: 75 %
RVPLETH-PRE: 1.8 L
RVPLETH-PRED: 2.38 L
TLCPLETH-%PRED-PRE: 74 %
TLCPLETH-PRE: 5.33 L
TLCPLETH-PRED: 7.13 L
VA-%PRED-PRE: 62 %
VA-PRE: 4.09 L
VC-%PRED-PRE: 70 %
VC-PRE: 3.53 L
VC-PRED: 5.04 L

## 2020-11-24 ENCOUNTER — HOSPITAL ENCOUNTER (OUTPATIENT)
Dept: CT IMAGING | Facility: CLINIC | Age: 60
End: 2020-11-24
Attending: INTERNAL MEDICINE
Payer: COMMERCIAL

## 2020-11-24 ENCOUNTER — HOSPITAL ENCOUNTER (OUTPATIENT)
Dept: CARDIOLOGY | Facility: CLINIC | Age: 60
End: 2020-11-24
Attending: INTERNAL MEDICINE
Payer: COMMERCIAL

## 2020-11-24 DIAGNOSIS — J84.9 ILD (INTERSTITIAL LUNG DISEASE) (H): ICD-10-CM

## 2020-11-24 DIAGNOSIS — R06.02 SHORTNESS OF BREATH: ICD-10-CM

## 2020-11-24 PROCEDURE — 999N000208 ECHOCARDIOGRAM COMPLETE

## 2020-11-24 PROCEDURE — 93306 TTE W/DOPPLER COMPLETE: CPT | Mod: 26 | Performed by: INTERNAL MEDICINE

## 2020-11-24 PROCEDURE — 250N000011 HC RX IP 250 OP 636: Performed by: STUDENT IN AN ORGANIZED HEALTH CARE EDUCATION/TRAINING PROGRAM

## 2020-11-24 PROCEDURE — 71260 CT THORAX DX C+: CPT

## 2020-11-24 PROCEDURE — 71260 CT THORAX DX C+: CPT | Mod: 26 | Performed by: RADIOLOGY

## 2020-11-24 RX ORDER — IOPAMIDOL 755 MG/ML
71 INJECTION, SOLUTION INTRAVASCULAR ONCE
Status: COMPLETED | OUTPATIENT
Start: 2020-11-24 | End: 2020-11-24

## 2020-11-24 RX ADMIN — IOPAMIDOL 71 ML: 755 INJECTION, SOLUTION INTRAVENOUS at 14:11

## 2020-12-01 NOTE — PROGRESS NOTES
Palliative Care Outpatient Clinic Progress Note    Patient Name:  Wilbert Hutchins  Primary Provider:  Michelle Tipton    Chief Complaint/Patient ID:  Wilbert Hutchins is a 59yo M with a history of ILD on O2  Limited mobility due to foot injury and overall health     Last Palliative Care Appointment:   11/11/20    Interim History:  Wilbert Hutchins 60 year old male returns to be seen by palliative care today.      Since last visit, has seen Dr. Lin who agreed low dose opioids may help his breathlessness.  He chronically smokes tobacco and marijuana and is not interested in stopping, which would prevent him from being a lung transplant candidate. His lung function test was overall stable, but was having increased shortness of breath and oxygen need. TTE and CT Chest did not show definite PE or shunt from what I can tell.  Here today to discuss starting low-dose morphine.     Over the last 6 months he has had a significant increase in his dyspnea.  He has tried increasing the oxygen and will not get much improvement.  There was not any modifiable findings on the testing Dr. Lin did last month.  He is able to perform his ADLs, but will have to stop while getting dressed or taking a shower to rest.  He has to stop to rest after walking from the kitchen to the bedroom.  He mostly sits in his garage. Smokes cigarettes and marijuana daily, not interested in quitting.      Coping:  Mood about the same.   Having phone visits with counselor.  Feels the pot keeps him even, settled, and calm and he is not interested in stopping.     Social History:  Pertinent changes to social history/social situation since last visit: None  Lives with ALETHA Prado, together 18 years  Patient has 5 children including, ranging from mid 20s to 30s and age.  Previous  service   Daily use of nonmedical cannabis, has been using since age 18, cigarettes.  Denies recent use of any other recreational or illicit drugs, sober  "since 2008  Rastafarian, not formally attending [employer] [facility] [employer]  Advance Directive Status:  Has completed but not in our system, would want partner Myra to make medical decisions  Social History     Tobacco Use     Smoking status: Current Some Day Smoker     Packs/day: 0.75     Types: Cigarettes     Smokeless tobacco: Never Used     Tobacco comment: cutting way down   Substance Use Topics     Alcohol use: No     Drug use: Yes     No Known Allergies     Current Outpatient Medications   Medication Sig Dispense Refill     albuterol (PROAIR HFA, PROVENTIL HFA, VENTOLIN HFA) 108 (90 BASE) MCG/ACT inhaler Inhale 2 puffs into the lungs every 6 hours as needed for shortness of breath / dyspnea 1 Inhaler 5     BD INSULIN SYRINGE U/F 31G X 5/16\" 1 ML miscellaneous   11     cetirizine (ZYRTEC) 10 MG tablet Take 10 mg by mouth       cholecalciferol 1000 units TABS Take 1,000 Units by mouth daily       fluticasone (FLONASE) 50 MCG/ACT nasal spray Spray 1 spray in nostril       folic acid (FOLVITE) 1 MG tablet Take 1 mg by mouth       gabapentin (NEURONTIN) 300 MG capsule Take 600 mg by mouth       hydroxychloroquine (PLAQUENIL) 200 MG tablet Take 200 mg by mouth       ibuprofen (IBU) 800 MG tablet TAKE ONE TABLET BY MOUTH FOUR TIMES DAILY AS NEEDED FOR PAIN       insulin NPH (NOVOLIN N VIAL) 100 UNIT/ML vial Inject 10 Units Subcutaneous       insulin syringe-needle U-100 (ELITE-THIN INSULIN SYRINGE) 31G X 5/16\" 1 ML miscellaneous Use to inject insulin as directed.       order for DME Upadated oxygen: Patient requires supplemental Oxygen 2 LPM via nasal canula with activity and nocturnally. Oxygen will be for a lifetime. 1 Device 0     predniSONE (DELTASONE) 10 MG tablet Take 13 mg by mouth       ranitidine (ZANTAC) 150 MG tablet   3     sertraline (ZOLOFT) 100 MG tablet Take 200 mg by mouth       simvastatin (ZOCOR) 40 MG tablet   3     sulfamethoxazole-trimethoprim (BACTRIM/SEPTRA) 400-80 MG tablet Take 1 tablet by mouth       PMH, PSH, " and FH reviewed - unchanged.  See my note from 11/11/20    Review of Systems:   ROS: 10 point ROS neg other than the symptoms noted above in the HPI and pertinents here:  Palliative Symptom Review (0=no symptom/no concern, 1=mild, 2=moderate, 3=severe):      Pain: 1      Fatigue: 3      Depressive Symptoms: 1      Anxiety: 1      Drowsiness: 0      Poor Appetite: 1      Shortness of Breath: 2-3      Other: 0      Overall (0 good/no concerns, 3 very poor):  1    Constitutional: Sitting up in chair, comfortable-appearing, in no distress   Eyes: Conjunctiva clear. Sclera anicteric  Cardiovascular: Normal rate, regular rhythm.    Respiratory:  Distant lung sounds with decreased air movement, +crackles in bases, L>R.  Speaking a sentence or two at a time, conversationally dyspneic.   GI:  Soft, protuberant  Musculoskeletal: No lower extremity edema.  Using a motorized wheelchair for ambulation  Neuro: Conversational, normal bulk and tone, no tremor.  Motor and sensation grossly intact.  Psych: Alert, appropriately interactive, full affect, clear sensorium.   Skin: Warm, no rash      Wt Readings from Last 10 Encounters:   11/19/20 99.3 kg (219 lb)   12/18/19 100.2 kg (221 lb)   09/25/19 100.2 kg (221 lb)   07/19/19 102.4 kg (225 lb 11.2 oz)   05/31/19 104.3 kg (230 lb)   01/12/15 93.4 kg (206 lb)   01/08/15 94 kg (207 lb 3.2 oz)   12/11/13 87.7 kg (193 lb 6.4 oz)   11/26/13 85.7 kg (189 lb)   09/05/13 85.4 kg (188 lb 3.2 oz)     m-Opioid Risk Tool (ORT):    Family History of Substance Abuse:        Alcohol = 3 pts (yes, male)       Illegal Drugs = 3 pts (yes, male)       Prescription Drugs = 0 pt (no)      Personal History of Substance Abuse:       Alcohol = 3 pts (yes, male)       Illegal Drugs = 4 pts (yes, male)       Prescription Drugs = 0 pt (no)        Age: 0 pt (age < 16; age > 45)      Psychological Disease: 1 pt (depression)      ORT Score = 14        0-3: Low risk for opioid abuse       4-7: Moderate risk for  opioid abuse       >/= 8: High risk for opioid abuse    [facility] Outpatient Palliative Care Opioid Prescribing Safety Plan    Opioid Safety Education: Reviewed by Jackie Oliveira MD on 12/3/2020  Opioid Risk Assessment: Performed by Jackie Oliveira MD on 12/3/2020.  ORT score of 14.   Mood Disorder Assessment: Performed by Jackie Oliveira MD on 12/3/2020.     reviewed with every prescription, last reviewed by Jackie Oliveira MD on 12/03/2020 - none    Prognosis >1 year  Risk: High (ORT>7)  Indication for Opioid Use: Moderate or Strong  Baseline and Ongoing UDT last performed on:12/3/2020  Naloxone Script provided on: 12/3/2020.   Pill Counts to be performed, plan & last pill count: liquid, will do 2 week refills  Indications for Tapering reviewed: Not appropriate today  Counseling Support: has    Key Data Reviewed:  Last labs 5/2019- Cre 0.91, GFR >90    CT chest 11/24/2020  IMPRESSION:  1. No evidence of acute pulmonary embolism or right heart strain.  2. Coronary artery disease.  3. Probable UIP-type interstitial lung disease. No groundglass  opacities. This appears to be somewhat confluent fibrotic.  4. Degenerative changes in the spine.    PFTs FEV1 72% predicted, reduced volumes c/w restriction, DLCO 42% predicted    TTE w bubble study -global LV and RV function normal. Late appearance of bubbles in the left heart.    Impression & Recommendations & Counseling:  Wilbert Hutchins 60 year old male seen today for follow-up with palliative care and discussion of opioids for dyspnea.  Garry is very forthcoming about his past, and has told his  that there is a plan to start opioids for his lung disease.  He is aware this can be high risk with his addiction history, says today quality of life is most important to him and would be willing to try opioids for breathlessness interfering with his ADLs.      # Interstitial lung disease:    # Dyspnea:  Has had functional decline over the last year, now  mostly sitting through the day and with dyspnea while performing ADLs.  Given his significant dyspnea on mild exertion, with the fact that he is not interested in stopping smoking pot of cigarettes, he knows he would not be a transplant candidate.   He describes feelings of panic when he can't catch his breath, which can happen 3 or 4 times/day. We've been discussing opioids for breathlessness, and he also had been told by Dr. Lin this was something to consider.  -   He is very open about his past with alcoholism, several DUIs, and voices good insight and concern about his ability to manage opioids.  We discussed balancing the benefits of morphine for dyspnea control with the risk of harm due to his addiction history.   Counseled to only take dose and frequency as prescribed.      - Will start morphine 5 mg PO scheduled twice a day, and assess dose and effectiveness with RN call in 2 weeks  - Will prescribe 2 weeks/time for now  - UDS today  - Naloxone prescribed  - Instructed to take senna 1-2 pills QHS, increase if no BM in 2 days     # Advance Care Planning:  - Instructed to bring copy of advance directive to clinic with his partner Myra identified as his healthcare agent    (This note was transcribed using voice recognition software. While I review and edit the transcription, I may miss errors, and the software sometimes does unexpected capitalizations and formatting that I miss. Please let me know of any serious mistranscriptions and I will addend this note.)    RTC in 6 weeks  40 minutes spent face-to-face, >50% in counseling on opioids for breathlessness, safety    Jackie Oliveira MD  Palliative Medicine  Pager [phone]     Answers for HPI/ROS submitted by the patient on 12/1/2020   General Symptoms: No  Skin Symptoms: No  HENT Symptoms: No  EYE SYMPTOMS: No  HEART SYMPTOMS: No  LUNG SYMPTOMS: No  INTESTINAL SYMPTOMS: No  URINARY SYMPTOMS: No  REPRODUCTIVE SYMPTOMS: No  SKELETAL SYMPTOMS: No  BLOOD  SYMPTOMS: No  NERVOUS SYSTEM SYMPTOMS: No  MENTAL HEALTH SYMPTOMS: No

## 2020-12-03 ENCOUNTER — OFFICE VISIT (OUTPATIENT)
Dept: PALLIATIVE CARE | Facility: CLINIC | Age: 60
End: 2020-12-03
Attending: INTERNAL MEDICINE
Payer: COMMERCIAL

## 2020-12-03 VITALS
HEIGHT: 70 IN | HEART RATE: 64 BPM | SYSTOLIC BLOOD PRESSURE: 135 MMHG | WEIGHT: 220.4 LBS | DIASTOLIC BLOOD PRESSURE: 85 MMHG | BODY MASS INDEX: 31.55 KG/M2 | RESPIRATION RATE: 18 BRPM | OXYGEN SATURATION: 97 %

## 2020-12-03 DIAGNOSIS — Z51.81 ENCOUNTER FOR THERAPEUTIC DRUG MONITORING: ICD-10-CM

## 2020-12-03 DIAGNOSIS — J84.9 ILD (INTERSTITIAL LUNG DISEASE) (H): ICD-10-CM

## 2020-12-03 DIAGNOSIS — R06.02 SHORTNESS OF BREATH: Primary | ICD-10-CM

## 2020-12-03 PROCEDURE — G0463 HOSPITAL OUTPT CLINIC VISIT: HCPCS

## 2020-12-03 PROCEDURE — 99215 OFFICE O/P EST HI 40 MIN: CPT | Performed by: INTERNAL MEDICINE

## 2020-12-03 PROCEDURE — 80307 DRUG TEST PRSMV CHEM ANLYZR: CPT | Performed by: INTERNAL MEDICINE

## 2020-12-03 RX ORDER — SENNOSIDES 8.6 MG
1-2 TABLET ORAL DAILY
Qty: 90 TABLET | Refills: 3 | Status: SHIPPED | OUTPATIENT
Start: 2020-12-03 | End: 2021-04-15

## 2020-12-03 RX ORDER — MORPHINE SULFATE 100 MG/5ML
5 SOLUTION ORAL 2 TIMES DAILY
Qty: 7 ML | Refills: 0 | Status: SHIPPED | OUTPATIENT
Start: 2020-12-03 | End: 2020-12-07

## 2020-12-03 ASSESSMENT — MIFFLIN-ST. JEOR: SCORE: 1825.98

## 2020-12-03 ASSESSMENT — PAIN SCALES - GENERAL: PAINLEVEL: MILD PAIN (3)

## 2020-12-03 NOTE — LETTER
12/3/2020       RE: Wilbert Hutchins  [address]  [facility] [id number]     Dear Colleague,    Thank you for referring your patient, Wilbert Hutchins, to the [facility] CANCER CLINIC at [facility]. Please see a copy of my visit note below.    Palliative Care Outpatient Clinic Progress Note    Patient Name:  Wilbert Hutchins  Primary Provider:  Michelle Tipton    Chief Complaint/Patient ID:  Wilbert Hutchins is a 61yo M with a history of ILD on O2  Limited mobility due to foot injury and overall health     Last Palliative Care Appointment:   11/11/20    Interim History:  Wilbert Hutchins 60 year old male returns to be seen by palliative care today.      Since last visit, has seen Dr. Lin who agreed low dose opioids may help his breathlessness.  He chronically smokes tobacco and marijuana and is not interested in stopping, which would prevent him from being a lung transplant candidate. His lung function test was overall stable, but was having increased shortness of breath and oxygen need. TTE and CT Chest did not show definite PE or shunt from what I can tell.  Here today to discuss starting low-dose morphine.     Over the last 6 months he has had a significant increase in his dyspnea.  He has tried increasing the oxygen and will not get much improvement.  There was not any modifiable findings on the testing Dr. Lin did last month.  He is able to perform his ADLs, but will have to stop while getting dressed or taking a shower to rest.  He has to stop to rest after walking from the kitchen to the bedroom.  He mostly sits in his garage. Smokes cigarettes and marijuana daily, not interested in quitting.      Coping:  Mood about the same.   Having phone visits with counselor.  Feels the pot keeps him even, settled, and calm and he is not interested in stopping.     Social History:  Pertinent changes to social history/social  "situation since last visit: None  Lives with ALETHA Prado together 18 years  Patient has 5 children including, ranging from mid 20s to 30s and age.  Previous  service   Daily use of nonmedical cannabis, has been using since age 18, cigarettes.  Denies recent use of any other recreational or illicit drugs, sober since 2008  Orthodox, not formally attending [employer]  Advance Directive Status:  Has completed but not in our system, would want partner Myra to make medical decisions  Social History     Tobacco Use     Smoking status: Current Some Day Smoker     Packs/day: 0.75     Types: Cigarettes     Smokeless tobacco: Never Used     Tobacco comment: cutting way down   Substance Use Topics     Alcohol use: No     Drug use: Yes     No Known Allergies     Current Outpatient Medications   Medication Sig Dispense Refill     albuterol (PROAIR HFA, PROVENTIL HFA, VENTOLIN HFA) 108 (90 BASE) MCG/ACT inhaler Inhale 2 puffs into the lungs every 6 hours as needed for shortness of breath / dyspnea 1 Inhaler 5     BD INSULIN SYRINGE U/F 31G X 5/16\" 1 ML miscellaneous   11     cetirizine (ZYRTEC) 10 MG tablet Take 10 mg by mouth       cholecalciferol 1000 units TABS Take 1,000 Units by mouth daily       fluticasone (FLONASE) 50 MCG/ACT nasal spray Spray 1 spray in nostril       folic acid (FOLVITE) 1 MG tablet Take 1 mg by mouth       gabapentin (NEURONTIN) 300 MG capsule Take 600 mg by mouth       hydroxychloroquine (PLAQUENIL) 200 MG tablet Take 200 mg by mouth       ibuprofen (IBU) 800 MG tablet TAKE ONE TABLET BY MOUTH FOUR TIMES DAILY AS NEEDED FOR PAIN       insulin NPH (NOVOLIN N VIAL) 100 UNIT/ML vial Inject 10 Units Subcutaneous       insulin syringe-needle U-100 (ELITE-THIN INSULIN SYRINGE) 31G X 5/16\" 1 ML miscellaneous Use to inject insulin as directed.       order for DME Upadated oxygen: Patient requires supplemental Oxygen 2 LPM via nasal canula with activity and nocturnally. Oxygen will be for a lifetime. 1 " Device 0     predniSONE (DELTASONE) 10 MG tablet Take 13 mg by mouth       ranitidine (ZANTAC) 150 MG tablet   3     sertraline (ZOLOFT) 100 MG tablet Take 200 mg by mouth       simvastatin (ZOCOR) 40 MG tablet   3     sulfamethoxazole-trimethoprim (BACTRIM/SEPTRA) 400-80 MG tablet Take 1 tablet by mouth       PMH, PSH, and FH reviewed - unchanged.  See my note from 11/11/20    Review of Systems:   ROS: 10 point ROS neg other than the symptoms noted above in the HPI and pertinents here:  Palliative Symptom Review (0=no symptom/no concern, 1=mild, 2=moderate, 3=severe):      Pain: 1      Fatigue: 3      Depressive Symptoms: 1      Anxiety: 1      Drowsiness: 0      Poor Appetite: 1      Shortness of Breath: 2-3      Other: 0      Overall (0 good/no concerns, 3 very poor):  1    Constitutional: Sitting up in chair, comfortable-appearing, in no distress   Eyes: Conjunctiva clear. Sclera anicteric  Cardiovascular: Normal rate, regular rhythm.    Respiratory:  Distant lung sounds with decreased air movement, +crackles in bases, L>R.  Speaking a sentence or two at a time, conversationally dyspneic.   GI:  Soft, protuberant  Musculoskeletal: No lower extremity edema.  Using a motorized wheelchair for ambulation  Neuro: Conversational, normal bulk and tone, no tremor.  Motor and sensation grossly intact.  Psych: Alert, appropriately interactive, full affect, clear sensorium.   Skin: Warm, no rash      Wt Readings from Last 10 Encounters:   11/19/20 99.3 kg (219 lb)   12/18/19 100.2 kg (221 lb)   09/25/19 100.2 kg (221 lb)   07/19/19 102.4 kg (225 lb 11.2 oz)   05/31/19 104.3 kg (230 lb)   01/12/15 93.4 kg (206 lb)   01/08/15 94 kg (207 lb 3.2 oz)   12/11/13 87.7 kg (193 lb 6.4 oz)   11/26/13 85.7 kg (189 lb)   09/05/13 85.4 kg (188 lb 3.2 oz)     m-Opioid Risk Tool (ORT):    Family History of Substance Abuse:        Alcohol = 3 pts (yes, male)       Illegal Drugs = 3 pts (yes, male)       Prescription Drugs = 0 pt (no)       Personal History of Substance Abuse:       Alcohol = 3 pts (yes, male)       Illegal Drugs = 4 pts (yes, male)       Prescription Drugs = 0 pt (no)        Age: 0 pt (age < 16; age > 45)      Psychological Disease: 1 pt (depression)      ORT Score = 14        0-3: Low risk for opioid abuse       4-7: Moderate risk for opioid abuse       >/= 8: High risk for opioid abuse    [facility] Outpatient Palliative Care Opioid Prescribing Safety Plan    Opioid Safety Education: Reviewed by Jackie Oliveira MD on 12/3/2020  Opioid Risk Assessment: Performed by Jackie Oliveira MD on 12/3/2020.  ORT score of 14.   Mood Disorder Assessment: Performed by Jackie Oliveira MD on 12/3/2020.     reviewed with every prescription, last reviewed by Jackie Oliveira MD on 12/03/2020 - none    Prognosis >1 year  Risk: High (ORT>7)  Indication for Opioid Use: Moderate or Strong  Baseline and Ongoing UDT last performed on:12/3/2020  Naloxone Script provided on: 12/3/2020.   Pill Counts to be performed, plan & last pill count: liquid, will do 2 week refills  Indications for Tapering reviewed: Not appropriate today  Counseling Support: has    Key Data Reviewed:  Last labs 5/2019- Cre 0.91, GFR >90    CT chest 11/24/2020  IMPRESSION:  1. No evidence of acute pulmonary embolism or right heart strain.  2. Coronary artery disease.  3. Probable UIP-type interstitial lung disease. No groundglass  opacities. This appears to be somewhat confluent fibrotic.  4. Degenerative changes in the spine.    PFTs FEV1 72% predicted, reduced volumes c/w restriction, DLCO 42% predicted    TTE w bubble study -global LV and RV function normal. Late appearance of bubbles in the left heart.    Impression & Recommendations & Counseling:  Juancarloskendra SHELLEY Cuong 60 year old male seen today for follow-up with palliative care and discussion of opioids for dyspnea.  Garry is very forthcoming about his past, and has told his  that there is a plan to start opioids  for his lung disease.  He is aware this can be high risk with his addiction history, says today quality of life is most important to him and would be willing to try opioids for breathlessness interfering with his ADLs.      # Interstitial lung disease:    # Dyspnea:  Has had functional decline over the last year, now mostly sitting through the day and with dyspnea while performing ADLs.  Given his significant dyspnea on mild exertion, with the fact that he is not interested in stopping smoking pot of cigarettes, he knows he would not be a transplant candidate.   He describes feelings of panic when he can't catch his breath, which can happen 3 or 4 times/day. We've been discussing opioids for breathlessness, and he also had been told by Dr. Lin this was something to consider.  -   He is very open about his past with alcoholism, several DUIs, and voices good insight and concern about his ability to manage opioids.  We discussed balancing the benefits of morphine for dyspnea control with the risk of harm due to his addiction history.   Counseled to only take dose and frequency as prescribed.      - Will start morphine 5 mg PO scheduled twice a day, and assess dose and effectiveness with RN call in 2 weeks  - Will prescribe 2 weeks/time for now  - UDS today  - Naloxone prescribed  - Instructed to take senna 1-2 pills QHS, increase if no BM in 2 days     # Advance Care Planning:  - Instructed to bring copy of advance directive to clinic with his partner Myra identified as his healthcare agent    (This note was transcribed using voice recognition software. While I review and edit the transcription, I may miss errors, and the software sometimes does unexpected capitalizations and formatting that I miss. Please let me know of any serious mistranscriptions and I will addend this note.)    RTC in 6 weeks  40 minutes spent face-to-face, >50% in counseling on opioids for breathlessness, safety    Jackie Oliveira MD  Palliative  Medicine  Pager [phone]

## 2020-12-03 NOTE — PATIENT INSTRUCTIONS
Thank you for seeing the Palliative Care Clinic today.      1. Start morphine 5 mg two times/day.    2. Take senna 1-2 tabs at night, increase to 2 tabs twice/day if you go 2 days without a BM.      Return in clinic in 4-6 weeks for a follow-up.      You can reach the Palliative Care Team during business hours at the following numbers:   -For the [facility], call [phone]  -To reach the palliative RN for questions or refills, call [phone]       To reach the Palliative Care Provider on-call After-hours or on holidays and weekends, call: [phone].  Please note that we are not able to provide pain medication refills on evenings or weekends.     [facility] Palliative Care Clinics   The [facility] Palliative Care Team is committed to treating your pain and other symptoms. This handout is for all patients treated with opioid medications in our clinics.     What are opioid medicines?   Opioid medications are used to ease some types of pain and shortness of breath. They are sometimes called narcotics. They include: Morphine (MS Contin, Roxanol), Oxycodone (OxyContin, OxyFast, Percocet), Hydrocodone (Vicodin, Norco), Hydromorphone (Dilaudid), Fentanyl (Duragesic), Methadone (Dolophine).   Are opioid medicines safe to take?   Opioid medicines are safe when you follow the directions from your doctor or medical provider.  Opioids can cause serious side effects and become unsafe if you do not follow your instructions.   To make sure you are taking opioid medicines safely, we may ask you to bring in your pill bottles or to allow us to test your urine. Our goal is to keep you safe and to improve your ability to function & be active. If we don t think opioids are safely doing that, we will work with you to taper you off of them.   Do not drive unless your medical provider tells you it is safe.   Do not take opioids with alcohol or  anxiety/sleep medicines unless your doctor tells you it is ok to do so.   Are opioids addictive?   Addiction means you crave a drug and have trouble limiting the amount you use.   Addiction is more likely to happen if you take opioids to relieve stress or to feel altered. If you or your loved one feels this way when taking opioid medicines, let your medical provider know. We may refer you for additional assessment or services if this is a concern.   Your body will get used to the opioid medicines if you take them for more than two weeks in a row. This means if you stop them suddenly, you may have withdrawal. If this occurs, you will feel uncomfortable (nausea, diarrhea, increased pain). This does not mean you are addicted. Never stop taking your pain medicines all at once unless your medical provider tells you to. If you think you need less medicine, your medical provider will help you to safely lower your dose.   What is the safest way to store opioids?   Keep your medicines in a safe place away from children, teens, pets, and visitors. Be careful about who knows that you have these medicines.   How do I get rid of my old opioids?   Opioids should be brought to your [facility] drop-off site, or you can purchase a disposal kit from your local pharmacy. If neither of these options is available, the Food and Drug Administration recommends that you flush unused opioids down the toilet.   Do not share or sell your pain medicines. This is illegal and very dangerous. We cannot prescribe opioid pain medicines to you if you do this.   How do I get refills?   Opioid medicine prescriptions have special safety features. This means it may take longer to refill than other medicines. Our clinic cannot prescribe them on weekends or at night.     Give us one week s notice when requesting a refill. This gives us the time we need to get it signed and back to you. It may be possible to mail prescriptions to you.

## 2020-12-03 NOTE — NURSING NOTE
"Oncology Rooming Note    December 3, 2020 8:08 AM   Wilbert Hutchins is a 60 year old male who presents for:    Chief Complaint   Patient presents with     Oncology Clinic Visit     Initial Vitals: /85   Pulse 64   Resp 18   Ht 1.778 m (5' 10\")   Wt 100 kg (220 lb 6.4 oz)   SpO2 97%   BMI 31.62 kg/m   Estimated body mass index is 31.62 kg/m  as calculated from the following:    Height as of this encounter: 1.778 m (5' 10\").    Weight as of this encounter: 100 kg (220 lb 6.4 oz). Body surface area is 2.22 meters squared.  Mild Pain (3) Comment: Data Unavailable   No LMP for male patient.  Allergies reviewed: Yes  Medications reviewed: Yes    Medications: Medication refills not needed today.  Pharmacy name entered into [facility]: [facility] [address] [place] [address]    Clinical concerns: No new concern today  Dr Oliveira  was notified.      Olimpia Lorenzo            "

## 2020-12-07 ENCOUNTER — PATIENT OUTREACH (OUTPATIENT)
Dept: PALLIATIVE CARE | Facility: CLINIC | Age: 60
End: 2020-12-07

## 2020-12-07 DIAGNOSIS — J84.9 ILD (INTERSTITIAL LUNG DISEASE) (H): ICD-10-CM

## 2020-12-07 DIAGNOSIS — R06.02 SHORTNESS OF BREATH: ICD-10-CM

## 2020-12-07 LAB — PAIN DRUG SCR UR W RPTD MEDS: NORMAL

## 2020-12-07 RX ORDER — MORPHINE SULFATE 100 MG/5ML
5 SOLUTION ORAL 2 TIMES DAILY
Qty: 7 ML | Refills: 0 | Status: SHIPPED | OUTPATIENT
Start: 2020-12-07 | End: 2020-12-07

## 2020-12-07 RX ORDER — MORPHINE SULFATE 10 MG/5ML
5 SOLUTION ORAL 2 TIMES DAILY
Qty: 35 ML | Refills: 0 | Status: SHIPPED | OUTPATIENT
Start: 2020-12-07 | End: 2020-12-15

## 2020-12-07 NOTE — PROGRESS NOTES
Spoke with pharmacy - they do not have this med in stock, also they would need to give full 30ml bottle as they do not break up bottles.     Pharmacist advised they do have 10mg/5ml solution on stock - insurance should cover 7 day supply of this. Asked them to discontinue current order and advised I would ask MD for new order. Pharmacist to do this.

## 2020-12-07 NOTE — PROGRESS NOTES
Received VM from patient - his pharmacy reports they had not received morphine prescribed last week. Called pharmacy - they did not receive script (transmission failed on escribe) and they do not have it in stock, would take 3-4 days to get in.     Spoke with patient. He is okay with new script being sent to [facility] [place] [facility] for . Apologized for the transmission failure and delay, he tanked me for the follow up call.

## 2020-12-15 ENCOUNTER — PATIENT OUTREACH (OUTPATIENT)
Dept: PALLIATIVE CARE | Facility: CLINIC | Age: 60
End: 2020-12-15

## 2020-12-15 DIAGNOSIS — R06.02 SHORTNESS OF BREATH: ICD-10-CM

## 2020-12-15 DIAGNOSIS — J84.9 ILD (INTERSTITIAL LUNG DISEASE) (H): ICD-10-CM

## 2020-12-15 RX ORDER — MORPHINE SULFATE 10 MG/5ML
5 SOLUTION ORAL 2 TIMES DAILY
Qty: 35 ML | Refills: 0 | COMMUNITY
Start: 2020-12-15 | End: 2020-12-22

## 2020-12-15 RX ORDER — MORPHINE SULFATE 10 MG/5ML
5 SOLUTION ORAL 2 TIMES DAILY
Qty: 150 ML | Refills: 0 | Status: SHIPPED | OUTPATIENT
Start: 2020-12-15 | End: 2020-12-15

## 2020-12-15 NOTE — PROGRESS NOTES
Received call from pharmacy that first 4 fills of MSIR need to be 35ml (7 day supply) or we could try for PA. Called and advised patient - he is okay with this. Called pharmacy and asked them to dispense the 7 day supply - will change historically in Epic.

## 2020-12-15 NOTE — PROGRESS NOTES
Called patient to check in regarding morphine tried last week. He tells me that when he was taking the morphine he wasn't really sure how it was working. But he ran out Sunday and tells me that he now realizes it was helping.     Has been out a few days and has noticed he feels more breathless.     Does think it made him a bit more tired or groggy - particularly in the middle of the night or when waking up. Not really that bothersome. No other side effects, bowels moving fine.    Would like to try for another refill, this time month supply.     Last refill: 12/7/2020 (7 day supply)  Last office visit: 12/3/2020  Follow up not scheduled yet, but scheduling team working on it.     [place]  Reviewed.

## 2020-12-22 ENCOUNTER — PATIENT OUTREACH (OUTPATIENT)
Dept: PALLIATIVE CARE | Facility: CLINIC | Age: 60
End: 2020-12-22

## 2020-12-22 DIAGNOSIS — J84.9 ILD (INTERSTITIAL LUNG DISEASE) (H): ICD-10-CM

## 2020-12-22 DIAGNOSIS — R06.02 SHORTNESS OF BREATH: ICD-10-CM

## 2020-12-22 RX ORDER — MORPHINE SULFATE 10 MG/5ML
5 SOLUTION ORAL 2 TIMES DAILY
Qty: 35 ML | Refills: 0 | Status: SHIPPED | OUTPATIENT
Start: 2020-12-22 | End: 2020-12-29

## 2020-12-22 NOTE — PROGRESS NOTES
Received VM from patient requesting refill of morphine.     Note - his insurance is limiting him to 7 day supply (35ml) for first 4 fills, this will be his 3rd fill.    Last refill: 12/15/2020  Last office visit: 12/3/2020  Schedulers working on follow up apt.     Will route request to MD for review.     Reviewed MN  Report.

## 2020-12-29 ENCOUNTER — PATIENT OUTREACH (OUTPATIENT)
Dept: PALLIATIVE CARE | Facility: CLINIC | Age: 60
End: 2020-12-29

## 2020-12-29 DIAGNOSIS — J84.9 ILD (INTERSTITIAL LUNG DISEASE) (H): ICD-10-CM

## 2020-12-29 DIAGNOSIS — R06.02 SHORTNESS OF BREATH: ICD-10-CM

## 2020-12-29 RX ORDER — MORPHINE SULFATE 10 MG/5ML
5 SOLUTION ORAL 2 TIMES DAILY
Qty: 35 ML | Refills: 0 | Status: SHIPPED | OUTPATIENT
Start: 2020-12-29 | End: 2021-01-05

## 2020-12-29 NOTE — PROGRESS NOTES
Received VM from patient requesting refill of morphine.      Note - his insurance is limiting him to 7 day supply (35ml) for first 4 fills, this will be his 4rd fill - can try for 30 day supply next week.     Last refill: 12/22/2020  Last office visit: 12/3/2020  Schedulers working on follow up apt.      Will route request to MD for review.      Reviewed MN  Report.

## 2021-01-05 ENCOUNTER — PATIENT OUTREACH (OUTPATIENT)
Dept: PALLIATIVE CARE | Facility: CLINIC | Age: 61
End: 2021-01-05

## 2021-01-05 DIAGNOSIS — J84.9 ILD (INTERSTITIAL LUNG DISEASE) (H): ICD-10-CM

## 2021-01-05 DIAGNOSIS — R06.02 SHORTNESS OF BREATH: ICD-10-CM

## 2021-01-05 RX ORDER — MORPHINE SULFATE 10 MG/5ML
5 SOLUTION ORAL 2 TIMES DAILY
Qty: 75 ML | Refills: 0 | Status: SHIPPED | OUTPATIENT
Start: 2021-01-05 | End: 2021-01-28

## 2021-01-05 NOTE — PROGRESS NOTES
Received VM from patient requesting refill of morphine.      Note - his insurance had been limiting him to 7 day supply (35ml) for first 4 fills, this will be his 5th fill. So will pend for 2 week supply as we initially tried to prescribe.    Last refill: 12/29/2020  Last office visit: 12/3/2020  Message sent to schedulers for follow up apt.     Will route request to MD for review.     Reviewed MN  Report.

## 2021-01-10 ENCOUNTER — HEALTH MAINTENANCE LETTER (OUTPATIENT)
Age: 61
End: 2021-01-10

## 2021-01-27 NOTE — PROGRESS NOTES
Garry is a 60 year old who is being evaluated via a billable telephone visit.      What phone number would you like to be contacted at? [phone]  How would you like to obtain your AVS? KATE Bryson    Phone call duration: 25 minutes    Palliative Care Outpatient Clinic Progress Note    Patient Name:  Wilbert Hutchins  Primary Provider:  Michelle Tipton    Chief Complaint/Patient ID:  Wilbert Hutchins is a 59yo M with a history of ILD on O2   - started on chronic opioids for dyspnea 12/2020  Limited mobility due to foot injury and overall health     Last Palliative Care Appointment:   12/3/2020 -in person appointment to discuss starting morphine for dyspnea.  Start 5 mg twice a day, q2wk precriptions.  Extensive discussion about safety and monitoring given high risk due to his substance use history.     Interim History:  Wilbert Hutchins 60 year old male returns to be seen by palliative care today.  Visit performed by telephone due to coronavirus pandemic.      Feels like it's taxing to talk, feels short of breath at rest, not much different that prior.  Taking Morphine 5 mg twice/day and can't tell much a difference, maybe 5%.  No excessive sleepiness or constipation.  Still feels too short of breath to do much walking, mostly sedentary.  He is able to perform his ADLs, but will have to stop while getting dressed or taking a shower to rest.  Rates dyspnea 6/10.     Asks about studies about effectiveness, when he would need to go to hospital for his breathing, coronavirus vaccine.     Social History:  Pertinent changes to social history/social situation since last visit: None  Lives with ALETHA Prado, together 18 years  Patient has 5 children including, ranging from mid 20s to 30s and age.  Previous  service   Daily use of nonmedical cannabis, has been using since age 18, cigarettes.  Denies recent use of any other recreational or illicit drugs, sober since 2008  Yoan  "not formally attending [facility]  Advance Directive Status:  Has completed but not in our system, would want partner Myra to make medical decisions  Social History     Tobacco Use     Smoking status: Current Some Day Smoker     Packs/day: 0.75     Types: Cigarettes     Smokeless tobacco: Never Used     Tobacco comment: cutting way down   Substance Use Topics     Alcohol use: No     Drug use: Yes     Allergies   Allergen Reactions     Trace Metals Dermatitis, Muscle Pain (Myalgia), Rash and Swelling        Current Outpatient Medications   Medication Sig Dispense Refill     albuterol (PROAIR HFA, PROVENTIL HFA, VENTOLIN HFA) 108 (90 BASE) MCG/ACT inhaler Inhale 2 puffs into the lungs every 6 hours as needed for shortness of breath / dyspnea 1 Inhaler 5     BD INSULIN SYRINGE U/F 31G X 5/16\" 1 ML miscellaneous   11     cetirizine (ZYRTEC) 10 MG tablet Take 10 mg by mouth       cholecalciferol 1000 units TABS Take 1,000 Units by mouth daily       fluticasone (FLONASE) 50 MCG/ACT nasal spray Spray 1 spray in nostril       folic acid (FOLVITE) 1 MG tablet Take 1 mg by mouth       gabapentin (NEURONTIN) 300 MG capsule Take 600 mg by mouth       hydroxychloroquine (PLAQUENIL) 200 MG tablet Take 200 mg by mouth       ibuprofen (IBU) 800 MG tablet TAKE ONE TABLET BY MOUTH FOUR TIMES DAILY AS NEEDED FOR PAIN       insulin NPH (NOVOLIN N VIAL) 100 UNIT/ML vial Inject 10 Units Subcutaneous       insulin syringe-needle U-100 (ELITE-THIN INSULIN SYRINGE) 31G X 5/16\" 1 ML miscellaneous Use to inject insulin as directed.       morphine 10 MG/5ML solution Take 2.5 mLs (5 mg) by mouth 2 times daily 75 mL 0     naloxone (NARCAN) 4 MG/0.1ML nasal spray Spray 1 spray (4 mg) into one nostril alternating nostrils as needed for opioid reversal every 2-3 minutes until assistance arrives 0.2 mL 0     order for DME Upadated oxygen: Patient requires supplemental Oxygen 2 LPM via nasal canula with activity and nocturnally. Oxygen will be for a " lifetime. 1 Device 0     predniSONE (DELTASONE) 10 MG tablet Take 13 mg by mouth       ranitidine (ZANTAC) 150 MG tablet   3     sennosides (SENOKOT) 8.6 MG tablet Take 1-2 tablets by mouth daily 90 tablet 3     sertraline (ZOLOFT) 100 MG tablet Take 200 mg by mouth       simvastatin (ZOCOR) 40 MG tablet   3     sulfamethoxazole-trimethoprim (BACTRIM/SEPTRA) 400-80 MG tablet Take 1 tablet by mouth       PMH, PSH, and FH reviewed - unchanged.  See my note from 11/11/20    Review of Systems:   ROS: 10 point ROS neg other than the symptoms noted above in the HPI and pertinents here:  Palliative Symptom Review (0=no symptom/no concern, 1=mild, 2=moderate, 3=severe):      Pain: 1      Fatigue: 3      Depressive Symptoms: 1      Anxiety: 1      Drowsiness: 0      Poor Appetite: 1      Shortness of Breath: 2-3      Other: 0      Overall (0 good/no concerns, 3 very poor):  1    No physical exam due to phone visit.  Able to speak in complete sentences, unlabored, voice sounds down.      Modified Gilmar Scale - 6/10    Wt Readings from Last 10 Encounters:   12/03/20 100 kg (220 lb 6.4 oz)   11/19/20 99.3 kg (219 lb)   12/18/19 100.2 kg (221 lb)   09/25/19 100.2 kg (221 lb)   07/19/19 102.4 kg (225 lb 11.2 oz)   05/31/19 104.3 kg (230 lb)   01/12/15 93.4 kg (206 lb)   01/08/15 94 kg (207 lb 3.2 oz)   12/11/13 87.7 kg (193 lb 6.4 oz)   11/26/13 85.7 kg (189 lb)       [facility] Outpatient Palliative Care Opioid Prescribing Safety Plan    Opioid Safety Education: Reviewed by Jackie Oliveira MD on 12/3/2020  Opioid Risk Assessment: Performed by Jackie Oliveira MD on 12/3/2020.  ORT score of 14.   Mood Disorder Assessment: Performed by Jackie Oliveira MD on 12/3/2020.     reviewed with every prescription, last reviewed by Jackie Oliveira MD on 01/28/2021 , last fill 1/5 and still has some left, so likely taking less than prescribed    Prognosis >1 year  Risk: High (ORT>7)  Indication for Opioid Use: Moderate or Strong  Baseline and  Ongoing UDT last performed on:12/3/2020  Naloxone Script provided on: 12/3/2020.   Pill Counts to be performed, plan & last pill count: liquid, will do 2 week refills.   Indications for Tapering reviewed: Not appropriate today  Counseling Support: has    Key Data Reviewed:  Drug Screen 12/3/20      C/w disclosure    Impression & Recommendations & Counseling:  Wilbert Hutchins 60 year old male seen today for follow-up with palliative care and discussion of opioids for dyspnea.  Garry is very forthcoming about his past, and we've discussed safety with this medication extensively, and has used less than prescribed.  He is aware this can be high risk with his addiction history, says today quality of life is most important to him and would be willing to try opioids for breathlessness interfering with his ADLs.      Interstitial lung disease:    Dyspnea:  Has had functional decline over the last year, now mostly sitting through the day and with dyspnea while performing ADLs.  Given his significant dyspnea on mild exertion, with the fact that he is not interested in stopping smoking pot of cigarettes, he knows he would not be a transplant candidate.   He describes feelings of panic when he can't catch his breath, which can happen 3 or 4 times/day. Could not tell much difference with morphine 5 mg PO BID, today dyspnea 6/10.   - Will increase morphine to 8 mg TID  - Continue 2wk refills - discussed refill policy and needs to call prior to running out for refills  - Continue senna as-needed to keep an easy BM every 1-2 days  - Counseled on situations to seek medical attention with breathing - fever, worsened productive cough, increase in dyspnea over days or O2 sat lower than baseline/<90 with O2 on    Coronavirus questions - directed to [facility] website for vaccine availability    (This note was transcribed using voice recognition software. While I review and edit the transcription, I may miss errors, and the software  sometimes does unexpected capitalizations and formatting that I miss. Please let me know of any serious mistranscriptions and I will addend this note.)    RTC in 1 month    Jackie Oliveira MD  Palliative Medicine  Pager [phone]

## 2021-01-28 ENCOUNTER — VIRTUAL VISIT (OUTPATIENT)
Dept: PALLIATIVE CARE | Facility: CLINIC | Age: 61
End: 2021-01-28
Attending: INTERNAL MEDICINE
Payer: COMMERCIAL

## 2021-01-28 DIAGNOSIS — R06.02 SHORTNESS OF BREATH: Primary | ICD-10-CM

## 2021-01-28 DIAGNOSIS — J84.9 ILD (INTERSTITIAL LUNG DISEASE) (H): ICD-10-CM

## 2021-01-28 PROCEDURE — 999N001193 HC VIDEO/TELEPHONE VISIT; NO CHARGE

## 2021-01-28 PROCEDURE — 99214 OFFICE O/P EST MOD 30 MIN: CPT | Mod: TEL | Performed by: INTERNAL MEDICINE

## 2021-01-28 RX ORDER — MORPHINE SULFATE 10 MG/5ML
4 SOLUTION ORAL 3 TIMES DAILY
Qty: 168 ML | Refills: 0 | Status: SHIPPED | OUTPATIENT
Start: 2021-01-28 | End: 2021-01-28

## 2021-01-28 RX ORDER — MORPHINE SULFATE 10 MG/5ML
4 SOLUTION ORAL 3 TIMES DAILY
Qty: 168 ML | Refills: 0 | Status: SHIPPED | OUTPATIENT
Start: 2021-01-28 | End: 2021-02-08

## 2021-01-28 NOTE — LETTER
1/28/2021       RE: Wilbert Hutchins  [address]  [facility] [address]     Dear Colleague,    Thank you for referring your patient, Wilbert Hutchins, to the [facility] at [facility]. Please see a copy of my visit note below.    Garry is a 60 year old who is being evaluated via a billable telephone visit.      What phone number would you like to be contacted at? [phone]  How would you like to obtain your AVS? KATE Bryson    Phone call duration: 25 minutes    Palliative Care Outpatient Clinic Progress Note    Patient Name:  Wilbert Hutchins  Primary Provider:  Michelle Tipton    Chief Complaint/Patient ID:  Wilbert Hutchins is a 59yo M with a history of ILD on O2   - started on chronic opioids for dyspnea 12/2020  Limited mobility due to foot injury and overall health     Last Palliative Care Appointment:   12/3/2020 -in person appointment to discuss starting morphine for dyspnea.  Start 5 mg twice a day, q2wk precriptions.  Extensive discussion about safety and monitoring given high risk due to his substance use history.     Interim History:  Wilbert Hutchins 60 year old male returns to be seen by palliative care today.  Visit performed by telephone due to coronavirus pandemic.      Feels like it's taxing to talk, feels short of breath at rest, not much different that prior.  Taking Morphine 5 mg twice/day and can't tell much a difference, maybe 5%.  No excessive sleepiness or constipation.  Still feels too short of breath to do much walking, mostly sedentary.  He is able to perform his ADLs, but will have to stop while getting dressed or taking a shower to rest.  Rates dyspnea 6/10.     Asks about studies about effectiveness, when he would need to go to hospital for his breathing, coronavirus vaccine.     Social History:  Pertinent changes to social history/social situation since last  "visit: None  Lives with ALETHA Prado together 18 years  Patient has 5 children including, ranging from mid 20s to 30s and age.  Previous  service   Daily use of nonmedical cannabis, has been using since age 18, cigarettes.  Denies recent use of any other recreational or illicit drugs, sober since 2008  Fa[employer], not formally attending [employer]  Advance Directive Status:  Has completed but not in our system, would want partner Myra to make medical decisions  Social History     Tobacco Use     Smoking status: Current Some Day Smoker     Packs/day: 0.75     Types: Cigarettes     Smokeless tobacco: Never Used     Tobacco comment: cutting way down   Substance Use Topics     Alcohol use: No     Drug use: Yes     Allergies   Allergen Reactions     Trace Metals Dermatitis, Muscle Pain (Myalgia), Rash and Swelling        Current Outpatient Medications   Medication Sig Dispense Refill     albuterol (PROAIR HFA, PROVENTIL HFA, VENTOLIN HFA) 108 (90 BASE) MCG/ACT inhaler Inhale 2 puffs into the lungs every 6 hours as needed for shortness of breath / dyspnea 1 Inhaler 5     BD INSULIN SYRINGE U/F 31G X 5/16\" 1 ML miscellaneous   11     cetirizine (ZYRTEC) 10 MG tablet Take 10 mg by mouth       cholecalciferol 1000 units TABS Take 1,000 Units by mouth daily       fluticasone (FLONASE) 50 MCG/ACT nasal spray Spray 1 spray in nostril       folic acid (FOLVITE) 1 MG tablet Take 1 mg by mouth       gabapentin (NEURONTIN) 300 MG capsule Take 600 mg by mouth       hydroxychloroquine (PLAQUENIL) 200 MG tablet Take 200 mg by mouth       ibuprofen (IBU) 800 MG tablet TAKE ONE TABLET BY MOUTH FOUR TIMES DAILY AS NEEDED FOR PAIN       insulin NPH (NOVOLIN N VIAL) 100 UNIT/ML vial Inject 10 Units Subcutaneous       insulin syringe-needle U-100 (ELITE-THIN INSULIN SYRINGE) 31G X 5/16\" 1 ML miscellaneous Use to inject insulin as directed.       morphine 10 MG/5ML solution Take 2.5 mLs (5 mg) by mouth 2 times daily 75 mL 0     naloxone " (NARCAN) 4 MG/0.1ML nasal spray Spray 1 spray (4 mg) into one nostril alternating nostrils as needed for opioid reversal every 2-3 minutes until assistance arrives 0.2 mL 0     order for DME Upadated oxygen: Patient requires supplemental Oxygen 2 LPM via nasal canula with activity and nocturnally. Oxygen will be for a lifetime. 1 Device 0     predniSONE (DELTASONE) 10 MG tablet Take 13 mg by mouth       ranitidine (ZANTAC) 150 MG tablet   3     sennosides (SENOKOT) 8.6 MG tablet Take 1-2 tablets by mouth daily 90 tablet 3     sertraline (ZOLOFT) 100 MG tablet Take 200 mg by mouth       simvastatin (ZOCOR) 40 MG tablet   3     sulfamethoxazole-trimethoprim (BACTRIM/SEPTRA) 400-80 MG tablet Take 1 tablet by mouth       PMH, PSH, and FH reviewed - unchanged.  See my note from 11/11/20    Review of Systems:   ROS: 10 point ROS neg other than the symptoms noted above in the HPI and pertinents here:  Palliative Symptom Review (0=no symptom/no concern, 1=mild, 2=moderate, 3=severe):      Pain: 1      Fatigue: 3      Depressive Symptoms: 1      Anxiety: 1      Drowsiness: 0      Poor Appetite: 1      Shortness of Breath: 2-3      Other: 0      Overall (0 good/no concerns, 3 very poor):  1    No physical exam due to phone visit.  Able to speak in complete sentences, unlabored, voice sounds down.      Modified Gilmar Scale - 6/10    Wt Readings from Last 10 Encounters:   12/03/20 100 kg (220 lb 6.4 oz)   11/19/20 99.3 kg (219 lb)   12/18/19 100.2 kg (221 lb)   09/25/19 100.2 kg (221 lb)   07/19/19 102.4 kg (225 lb 11.2 oz)   05/31/19 104.3 kg (230 lb)   01/12/15 93.4 kg (206 lb)   01/08/15 94 kg (207 lb 3.2 oz)   12/11/13 87.7 kg (193 lb 6.4 oz)   11/26/13 85.7 kg (189 lb)       [facility] Outpatient Palliative Care Opioid Prescribing Safety Plan    Opioid Safety Education: Reviewed by Jackie Oliveira MD on 12/3/2020  Opioid Risk Assessment: Performed by Jackie Oliveira MD on 12/3/2020.  ORT score of 14.   Mood Disorder  Assessment: Performed by Jackie Oliveira MD on 12/3/2020.     reviewed with every prescription, last reviewed by Jackie Oliveira MD on 01/28/2021 , last fill 1/5 and still has some left, so likely taking less than prescribed    Prognosis >1 year  Risk: High (ORT>7)  Indication for Opioid Use: Moderate or Strong  Baseline and Ongoing UDT last performed on:12/3/2020  Naloxone Script provided on: 12/3/2020.   Pill Counts to be performed, plan & last pill count: liquid, will do 2 week refills.   Indications for Tapering reviewed: Not appropriate today  Counseling Support: has    Key Data Reviewed:  Drug Screen 12/3/20      C/w disclosure    Impression & Recommendations & Counseling:  Wilbert Hutchins 60 year old male seen today for follow-up with palliative care and discussion of opioids for dyspnea.  Garry is very forthcoming about his past, and we've discussed safety with this medication extensively, and has used less than prescribed.  He is aware this can be high risk with his addiction history, says today quality of life is most important to him and would be willing to try opioids for breathlessness interfering with his ADLs.      Interstitial lung disease:    Dyspnea:  Has had functional decline over the last year, now mostly sitting through the day and with dyspnea while performing ADLs.  Given his significant dyspnea on mild exertion, with the fact that he is not interested in stopping smoking pot of cigarettes, he knows he would not be a transplant candidate.   He describes feelings of panic when he can't catch his breath, which can happen 3 or 4 times/day. Could not tell much difference with morphine 5 mg PO BID, today dyspnea 6/10.   - Will increase morphine to 8 mg TID  - Continue 2wk refills - discussed refill policy and needs to call prior to running out for refills  - Continue senna as-needed to keep an easy BM every 1-2 days  - Counseled on situations to seek medical attention with breathing -  fever, worsened productive cough, increase in dyspnea over days or O2 sat lower than baseline/<90 with O2 on    Coronavirus questions - directed to [facility] website for vaccine availability    (This note was transcribed using voice recognition software. While I review and edit the transcription, I may miss errors, and the software sometimes does unexpected capitalizations and formatting that I miss. Please let me know of any serious mistranscriptions and I will addend this note.)    RTC in 1 month    Jackie Oliveira MD  Palliative Medicine  Pager [phone]

## 2021-01-28 NOTE — PATIENT INSTRUCTIONS
1.  Increase the morphine to 4 mL three times/day, doses at least 4 hours apart.  You will need to call or send a nPicker message at least 5 days before you run out so we can get your refill.      2. Look here for news about when you can sign up for a coronavirus vaccine:  https://[place].gov/covid19/vaccine/find-vaccine/index.jsp    3. See below scale for rating your shortness of breath - I think following this over time will be helpful with determining if the morphine is helping.  Today you rated your breathing a 6.          You can reach the Palliative Care Team during business hours at the following numbers:   -For the [facility], call [phone]  -To reach the palliative RN for questions or refills, call [phone]    To reach the Palliative Care Provider on-call After-hours or on holidays and weekends, call: [phone].  Please note that we are not able to provide pain medication refills on evenings or weekends.

## 2021-02-08 DIAGNOSIS — J84.9 ILD (INTERSTITIAL LUNG DISEASE) (H): ICD-10-CM

## 2021-02-08 DIAGNOSIS — R06.02 SHORTNESS OF BREATH: ICD-10-CM

## 2021-02-08 RX ORDER — MORPHINE SULFATE 10 MG/5ML
4 SOLUTION ORAL 3 TIMES DAILY
Qty: 168 ML | Refills: 0 | Status: SHIPPED | OUTPATIENT
Start: 2021-02-10 | End: 2021-02-22

## 2021-02-08 NOTE — TELEPHONE ENCOUNTER
Refill Request    Morphine 10mg/5ml solution    Last filled 1/28/21    Last visit 1/28/21    Next visit not yet scheduled

## 2021-02-22 ENCOUNTER — PATIENT OUTREACH (OUTPATIENT)
Dept: PALLIATIVE CARE | Facility: CLINIC | Age: 61
End: 2021-02-22

## 2021-02-22 DIAGNOSIS — J84.9 ILD (INTERSTITIAL LUNG DISEASE) (H): ICD-10-CM

## 2021-02-22 DIAGNOSIS — R06.02 SHORTNESS OF BREATH: ICD-10-CM

## 2021-02-22 RX ORDER — MORPHINE SULFATE 10 MG/5ML
4 SOLUTION ORAL 3 TIMES DAILY
Qty: 168 ML | Refills: 0 | Status: SHIPPED | OUTPATIENT
Start: 2021-02-24 | End: 2021-03-08

## 2021-02-22 NOTE — PROGRESS NOTES
Received VM from patient requesting refill of morphine.     Last refill: 2/10/2021  Last office visit: 1/28/2021  Scheduled for follow up 3/3/2021    Will route request to MD for review.     Reviewed [place]  Report.

## 2021-02-25 ENCOUNTER — VIRTUAL VISIT (OUTPATIENT)
Dept: PALLIATIVE CARE | Facility: CLINIC | Age: 61
End: 2021-02-25
Attending: INTERNAL MEDICINE
Payer: COMMERCIAL

## 2021-02-25 DIAGNOSIS — J84.9 ILD (INTERSTITIAL LUNG DISEASE) (H): ICD-10-CM

## 2021-02-25 DIAGNOSIS — R06.02 SHORTNESS OF BREATH: Primary | ICD-10-CM

## 2021-02-25 DIAGNOSIS — Z51.5 ENCOUNTER FOR PALLIATIVE CARE: ICD-10-CM

## 2021-02-25 PROCEDURE — 99214 OFFICE O/P EST MOD 30 MIN: CPT | Mod: TEL | Performed by: INTERNAL MEDICINE

## 2021-02-25 PROCEDURE — 999N001193 HC VIDEO/TELEPHONE VISIT; NO CHARGE

## 2021-02-25 NOTE — LETTER
"2/25/2021       RE: Wilbert Hutchins  [address]  [facility] [address]     Dear Colleague,    Thank you for referring your patient, Wilbert Hutchins, to the [facility] at . Please see a copy of my visit note below.    Garry is a 60 year old who is being evaluated via a billable telephone visit.      What phone number would you like to be contacted at? [phone]  How would you like to obtain your AVS? MyChart   Vitals - Patient Reported  Weight (Patient Reported): 99.8 kg (220 lb)  Height (Patient Reported): 177.8 cm (5' 10\")  BMI (Based on Pt Reported Ht/Wt): 31.57  Pain Score: No Pain (0)    Phone call duration: 22 minutes    Lyudmila James MA      Palliative Care Outpatient Clinic Progress Note    Patient Name:  Wilbert Hutchins  Primary Provider:  Michelle Tipton    Chief Complaint/Patient ID:  Wilbert Hutchins is a 59yo M with a history of ILD on 3L O2   - started on chronic opioids for dyspnea 12/2020  Limited mobility due to foot injury and overall health     Last Palliative Care Appointment:   1/28/21 - increased morphine to 8 mg TID    Interim History:  Wilbert Hutchins 60 year old male returns to be seen by palliative care today.  Visit performed by telephone due to coronavirus pandemic.      Now taking morphine 8 mg three times/day, feels it takes the anxiety out of feeling short of breath.  Anytime he walks more than 10 feet without oxygen, the O2 level drops really quickly to 70s. 93 at rest without 02.   Feels this dose is doing well, doesn't feel sedated.  Not having constipation.  No cravings, or urges to take more, feels able to control use.     Says his RN CC recommended talking about social work for counseling - Garry says he and Eve have had more fights about his fatigue/decreased mobility and participation.  He feels he accepts that he's coming to the end of life, " "but that she is having more trouble with this.  He'd like to meet with them, thinks may help.  He also follows with a psychiatrist q3m for depression.     Social History:  Pertinent changes to social history/social situation since last visit: None  Lives with ALETHA Prado, together 18 years  Patient has 5 children including, ranging from mid 20s to 30s and age.  Previous  service   Daily use of nonmedical cannabis, has been using since age 18, cigarettes.  Denies recent use of any other recreational or illicit drugs, sober since 2008  [employer], not formally attending [employer]  Advance Directive Status:  Has completed but not in our system, would want partner Myra to make medical decisions  Social History     Tobacco Use     Smoking status: Current Some Day Smoker     Packs/day: 0.75     Types: Cigarettes     Smokeless tobacco: Never Used     Tobacco comment: cutting way down   Substance Use Topics     Alcohol use: No     Drug use: Yes     Allergies   Allergen Reactions     Trace Metals Dermatitis, Muscle Pain (Myalgia), Rash and Swelling        Current Outpatient Medications   Medication Sig Dispense Refill     albuterol (PROAIR HFA, PROVENTIL HFA, VENTOLIN HFA) 108 (90 BASE) MCG/ACT inhaler Inhale 2 puffs into the lungs every 6 hours as needed for shortness of breath / dyspnea 1 Inhaler 5     BD INSULIN SYRINGE U/F 31G X 5/16\" 1 ML miscellaneous   11     cetirizine (ZYRTEC) 10 MG tablet Take 10 mg by mouth       cholecalciferol 1000 units TABS Take 1,000 Units by mouth daily       fluticasone (FLONASE) 50 MCG/ACT nasal spray Spray 1 spray in nostril       folic acid (FOLVITE) 1 MG tablet Take 1 mg by mouth       gabapentin (NEURONTIN) 300 MG capsule Take 600 mg by mouth       hydroxychloroquine (PLAQUENIL) 200 MG tablet Take 200 mg by mouth       ibuprofen (IBU) 800 MG tablet TAKE ONE TABLET BY MOUTH FOUR TIMES DAILY AS NEEDED FOR PAIN       insulin NPH (NOVOLIN N VIAL) 100 UNIT/ML vial Inject 10 Units " "Subcutaneous       morphine 10 MG/5ML solution Take 4 mLs (8 mg) by mouth 3 times daily 168 mL 0     naloxone (NARCAN) 4 MG/0.1ML nasal spray Spray 1 spray (4 mg) into one nostril alternating nostrils as needed for opioid reversal every 2-3 minutes until assistance arrives 0.2 mL 0     order for DME Upadated oxygen: Patient requires supplemental Oxygen 2 LPM via nasal canula with activity and nocturnally. Oxygen will be for a lifetime. 1 Device 0     predniSONE (DELTASONE) 10 MG tablet Take 13 mg by mouth       ranitidine (ZANTAC) 150 MG tablet   3     sertraline (ZOLOFT) 100 MG tablet Take 200 mg by mouth       simvastatin (ZOCOR) 40 MG tablet   3     insulin syringe-needle U-100 (ELITE-THIN INSULIN SYRINGE) 31G X 5/16\" 1 ML miscellaneous Use to inject insulin as directed.       sennosides (SENOKOT) 8.6 MG tablet Take 1-2 tablets by mouth daily 90 tablet 3     sulfamethoxazole-trimethoprim (BACTRIM/SEPTRA) 400-80 MG tablet Take 1 tablet by mouth         Review of Systems:  Palliative Symptom Review (0=no symptom/no concern, 1=mild, 2=moderate, 3=severe):      Pain: 1      Fatigue: 3      Depressive Symptoms: 1      Anxiety: 1      Drowsiness: 0      Poor Appetite: 1      Shortness of Breath: 2-3      Other: 0      Overall (0 good/no concerns, 3 very poor):  1    No physical exam due to phone visit.  Able to speak in complete sentences, unlabored, voice sounds down.      Modified Gilmar Scale - 2/10    Wt Readings from Last 10 Encounters:   12/03/20 100 kg (220 lb 6.4 oz)   11/19/20 99.3 kg (219 lb)   12/18/19 100.2 kg (221 lb)   09/25/19 100.2 kg (221 lb)   07/19/19 102.4 kg (225 lb 11.2 oz)   05/31/19 104.3 kg (230 lb)   01/12/15 93.4 kg (206 lb)   01/08/15 94 kg (207 lb 3.2 oz)   12/11/13 87.7 kg (193 lb 6.4 oz)   11/26/13 85.7 kg (189 lb)       [facility] Outpatient Palliative Care Opioid Prescribing Safety Plan    Opioid Safety Education: Reviewed by Jackie Oliveira MD on 12/3/2020  Opioid Risk Assessment: " Performed by Jackie Oliveira MD on 12/3/2020.  ORT score of 14.   Mood Disorder Assessment: Performed by Jackie Oliveira MD on 12/3/2020.     reviewed with every prescription, last reviewed by Jackie Oliveira MD on 02/25/2021 , last fill 2/24, no issues    Prognosis >1 year  Risk: High (ORT>7)  Indication for Opioid Use: Moderate or Strong  Baseline and Ongoing UDT last performed on:12/3/2020  Naloxone Script provided on: 12/3/2020.   Pill Counts to be performed, plan & last pill count: liquid, has been doing well w 2 week refills, so will broaden to 1 month  Indications for Tapering reviewed: Not appropriate today  Counseling Support: reviewed 2/25/21    Key Data Reviewed:  Cre 0.79,     Impression & Recommendations & Counseling:  Wilbert Hutchins 60 year old male seen today for follow-up with palliative care and discussion of opioids for dyspnea.  Garry is very forthcoming about his past, and we've discussed safety with this medication extensively, and has used less than prescribed.  He is aware this can be high risk with his addiction history, says today quality of life is most important to him and would be willing to try opioids for breathlessness interfering with his ADLs.  Feels improvement with 8 mg TID, self-managing meds with no concerns.      Interstitial lung disease, breathlessness, OIC, coping  Has had functional decline over the last year, now mostly sitting through the day and with dyspnea while performing ADLs.  Given his significant dyspnea on mild exertion, with the fact that he is not interested in stopping smoking pot of cigarettes, he knows he would not be a transplant candidate.   He describes feelings of panic when he can't catch his breath, which can happen 3 or 4 times/day. Could not tell much difference with morphine 5 mg PO BID, dyspnea improved 6/10->2/10 with morphine 8 mg TID  - Continue morphine to 8 mg TID  - Doing well on 2wk refills with no concerning behavior - will  start monthly fills  - Continue senna as-needed to keep an easy BM every 1-2 days  - Will refer to palliative SW for psychosocial support, coping, and relationship stress due to his changes in health and lung disease  - Today he asks about prognosis - had previously been told he had 3-5 years to live, but that was 4 years ago.  I think he's likely in the 1-few years range now, and we talked about the expected progression of chronic lung disease.      35 minutes spent including reviewing record, review of above studies, above visit with patient, and documentation.     (This note was transcribed using voice recognition software. While I review and edit the transcription, I may miss errors, and the software sometimes does unexpected capitalizations and formatting that I miss. Please let me know of any serious mistranscriptions and I will addend this note.)    RTC in 2-3 months    Jackie Oliveira MD  Palliative Medicine  Pager [phone]

## 2021-02-25 NOTE — PROGRESS NOTES
"Garry is a 60 year old who is being evaluated via a billable telephone visit.      What phone number would you like to be contacted at? [phone]  How would you like to obtain your AVS? Leonel   Vitals - Patient Reported  Weight (Patient Reported): 99.8 kg (220 lb)  Height (Patient Reported): 177.8 cm (5' 10\")  BMI (Based on Pt Reported Ht/Wt): 31.57  Pain Score: No Pain (0)    Phone call duration: 22 minutes    Lyudmila James MA      Palliative Care Outpatient Clinic Progress Note    Patient Name:  Wilbert Hutchins  Primary Provider:  Michelle Tipton    Chief Complaint/Patient ID:  Wilbert Hutchins is a 61yo M with a history of ILD on 3L O2   - started on chronic opioids for dyspnea 12/2020  Limited mobility due to foot injury and overall health     Last Palliative Care Appointment:   1/28/21 - increased morphine to 8 mg TID    Interim History:  Wilbert Hutchins 60 year old male returns to be seen by palliative care today.  Visit performed by telephone due to coronavirus pandemic.      Now taking morphine 8 mg three times/day, feels it takes the anxiety out of feeling short of breath.  Anytime he walks more than 10 feet without oxygen, the O2 level drops really quickly to 70s. 93 at rest without 02.   Feels this dose is doing well, doesn't feel sedated.  Not having constipation.  No cravings, or urges to take more, feels able to control use.     Says his RN CC recommended talking about social work for counseling - Garry says he and Eve have had more fights about his fatigue/decreased mobility and participation.  He feels he accepts that he's coming to the end of life, but that she is having more trouble with this.  He'd like to meet with them, thinks may help.  He also follows with a psychiatrist q3m for depression.     Social History:  Pertinent changes to social history/social situation since last visit: None  Lives with SO Eve, together 18 years  Patient has 5 children including, " "ranging from mid 20s to 30s and age.  Previous  service   Daily use of nonmedical cannabis, has been using since age 18, cigarettes.  Denies recent use of any other recreational or illicit drugs, sober since 2008  [employer], not formally attending [employer]  Advance Directive Status:  Has completed but not in our system, would want partner Myra to make medical decisions  Social History     Tobacco Use     Smoking status: Current Some Day Smoker     Packs/day: 0.75     Types: Cigarettes     Smokeless tobacco: Never Used     Tobacco comment: cutting way down   Substance Use Topics     Alcohol use: No     Drug use: Yes     Allergies   Allergen Reactions     Trace Metals Dermatitis, Muscle Pain (Myalgia), Rash and Swelling        Current Outpatient Medications   Medication Sig Dispense Refill     albuterol (PROAIR HFA, PROVENTIL HFA, VENTOLIN HFA) 108 (90 BASE) MCG/ACT inhaler Inhale 2 puffs into the lungs every 6 hours as needed for shortness of breath / dyspnea 1 Inhaler 5     BD INSULIN SYRINGE U/F 31G X 5/16\" 1 ML miscellaneous   11     cetirizine (ZYRTEC) 10 MG tablet Take 10 mg by mouth       cholecalciferol 1000 units TABS Take 1,000 Units by mouth daily       fluticasone (FLONASE) 50 MCG/ACT nasal spray Spray 1 spray in nostril       folic acid (FOLVITE) 1 MG tablet Take 1 mg by mouth       gabapentin (NEURONTIN) 300 MG capsule Take 600 mg by mouth       hydroxychloroquine (PLAQUENIL) 200 MG tablet Take 200 mg by mouth       ibuprofen (IBU) 800 MG tablet TAKE ONE TABLET BY MOUTH FOUR TIMES DAILY AS NEEDED FOR PAIN       insulin NPH (NOVOLIN N VIAL) 100 UNIT/ML vial Inject 10 Units Subcutaneous       morphine 10 MG/5ML solution Take 4 mLs (8 mg) by mouth 3 times daily 168 mL 0     naloxone (NARCAN) 4 MG/0.1ML nasal spray Spray 1 spray (4 mg) into one nostril alternating nostrils as needed for opioid reversal every 2-3 minutes until assistance arrives 0.2 mL 0     order for DME Upadated oxygen: Patient " "requires supplemental Oxygen 2 LPM via nasal canula with activity and nocturnally. Oxygen will be for a lifetime. 1 Device 0     predniSONE (DELTASONE) 10 MG tablet Take 13 mg by mouth       ranitidine (ZANTAC) 150 MG tablet   3     sertraline (ZOLOFT) 100 MG tablet Take 200 mg by mouth       simvastatin (ZOCOR) 40 MG tablet   3     insulin syringe-needle U-100 (ELITE-THIN INSULIN SYRINGE) 31G X 5/16\" 1 ML miscellaneous Use to inject insulin as directed.       sennosides (SENOKOT) 8.6 MG tablet Take 1-2 tablets by mouth daily 90 tablet 3     sulfamethoxazole-trimethoprim (BACTRIM/SEPTRA) 400-80 MG tablet Take 1 tablet by mouth         Review of Systems:  Palliative Symptom Review (0=no symptom/no concern, 1=mild, 2=moderate, 3=severe):      Pain: 1      Fatigue: 3      Depressive Symptoms: 1      Anxiety: 1      Drowsiness: 0      Poor Appetite: 1      Shortness of Breath: 2-3      Other: 0      Overall (0 good/no concerns, 3 very poor):  1    No physical exam due to phone visit.  Able to speak in complete sentences, unlabored, voice sounds down.      Modified Gilmar Scale - 2/10    Wt Readings from Last 10 Encounters:   12/03/20 100 kg (220 lb 6.4 oz)   11/19/20 99.3 kg (219 lb)   12/18/19 100.2 kg (221 lb)   09/25/19 100.2 kg (221 lb)   07/19/19 102.4 kg (225 lb 11.2 oz)   05/31/19 104.3 kg (230 lb)   01/12/15 93.4 kg (206 lb)   01/08/15 94 kg (207 lb 3.2 oz)   12/11/13 87.7 kg (193 lb 6.4 oz)   11/26/13 85.7 kg (189 lb)       [facility] Outpatient Palliative Care Opioid Prescribing Safety Plan    Opioid Safety Education: Reviewed by Jackie Oliveira MD on 12/3/2020  Opioid Risk Assessment: Performed by Jackie Oliveira MD on 12/3/2020.  ORT score of 14.   Mood Disorder Assessment: Performed by Jackie Oliveira MD on 12/3/2020.     reviewed with every prescription, last reviewed by Jackie Oliveira MD on 02/25/2021 , last fill 2/24, no issues    Prognosis >1 year  Risk: High (ORT>7)  Indication for Opioid Use: Moderate " or Strong  Baseline and Ongoing UDT last performed on:12/3/2020  Naloxone Script provided on: 12/3/2020.   Pill Counts to be performed, plan & last pill count: liquid, has been doing well w 2 week refills, so will broaden to 1 month  Indications for Tapering reviewed: Not appropriate today  Counseling Support: reviewed 2/25/21    Key Data Reviewed:  Cre 0.79,     Impression & Recommendations & Counseling:  Wilbert Hutchins 60 year old male seen today for follow-up with palliative care and discussion of opioids for dyspnea.  Garry is very forthcoming about his past, and we've discussed safety with this medication extensively, and has used less than prescribed.  He is aware this can be high risk with his addiction history, says today quality of life is most important to him and would be willing to try opioids for breathlessness interfering with his ADLs.  Feels improvement with 8 mg TID, self-managing meds with no concerns.      Interstitial lung disease, breathlessness, OIC, coping  Has had functional decline over the last year, now mostly sitting through the day and with dyspnea while performing ADLs.  Given his significant dyspnea on mild exertion, with the fact that he is not interested in stopping smoking pot of cigarettes, he knows he would not be a transplant candidate.   He describes feelings of panic when he can't catch his breath, which can happen 3 or 4 times/day. Could not tell much difference with morphine 5 mg PO BID, dyspnea improved 6/10->2/10 with morphine 8 mg TID  - Continue morphine to 8 mg TID  - Doing well on 2wk refills with no concerning behavior - will start monthly fills  - Continue senna as-needed to keep an easy BM every 1-2 days  - Will refer to palliative SW for psychosocial support, coping, and relationship stress due to his changes in health and lung disease  - Today he asks about prognosis - had previously been told he had 3-5 years to live, but that was 4 years ago.  I  think he's likely in the 1-few years range now, and we talked about the expected progression of chronic lung disease.      35 minutes spent including reviewing record, review of above studies, above visit with patient, and documentation.     (This note was transcribed using voice recognition software. While I review and edit the transcription, I may miss errors, and the software sometimes does unexpected capitalizations and formatting that I miss. Please let me know of any serious mistranscriptions and I will addend this note.)      RTC in 2-3 months    Jackie Oliveira MD  Palliative Medicine  Pager [phone]

## 2021-03-04 ENCOUNTER — VIRTUAL VISIT (OUTPATIENT)
Dept: ONCOLOGY | Facility: CLINIC | Age: 61
End: 2021-03-04
Attending: INTERNAL MEDICINE
Payer: COMMERCIAL

## 2021-03-04 DIAGNOSIS — F43.25 ADJUSTMENT DISORDER WITH MIXED DISTURBANCE OF EMOTIONS AND CONDUCT: Primary | ICD-10-CM

## 2021-03-04 DIAGNOSIS — Z51.5 ENCOUNTER FOR PALLIATIVE CARE: ICD-10-CM

## 2021-03-04 DIAGNOSIS — F43.10 POSTTRAUMATIC STRESS DISORDER: ICD-10-CM

## 2021-03-04 PROCEDURE — 90791 PSYCH DIAGNOSTIC EVALUATION: CPT | Mod: GT,TEL | Performed by: SOCIAL WORKER

## 2021-03-04 PROCEDURE — 999N001193 HC VIDEO/TELEPHONE VISIT; NO CHARGE
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
## 2021-03-08 ENCOUNTER — PATIENT OUTREACH (OUTPATIENT)
Dept: PALLIATIVE CARE | Facility: CLINIC | Age: 61
End: 2021-03-08

## 2021-03-08 DIAGNOSIS — J84.9 ILD (INTERSTITIAL LUNG DISEASE) (H): ICD-10-CM

## 2021-03-08 DIAGNOSIS — R06.02 SHORTNESS OF BREATH: ICD-10-CM

## 2021-03-08 RX ORDER — MORPHINE SULFATE 10 MG/5ML
4 SOLUTION ORAL 3 TIMES DAILY
Qty: 360 ML | Refills: 0 | Status: SHIPPED | OUTPATIENT
Start: 2021-03-09 | End: 2021-04-05

## 2021-03-08 NOTE — PROGRESS NOTES
Received VM from patient requesting refill of morphine. He notes that he thinks he is able to get a full month refill now and going forward.      Last refill: 2/24/2021 (2 week supply)  Last office visit: 2/25/2021  Scheduled for follow up 4/15/2021     Will route request to MD for review.     Reviewed [place]  Report.

## 2021-03-21 ENCOUNTER — MYC MEDICAL ADVICE (OUTPATIENT)
Dept: PEDIATRICS | Facility: CLINIC | Age: 61
End: 2021-03-21

## 2021-04-05 ENCOUNTER — PATIENT OUTREACH (OUTPATIENT)
Dept: PALLIATIVE CARE | Facility: CLINIC | Age: 61
End: 2021-04-05

## 2021-04-05 DIAGNOSIS — J84.9 ILD (INTERSTITIAL LUNG DISEASE) (H): ICD-10-CM

## 2021-04-05 DIAGNOSIS — R06.02 SHORTNESS OF BREATH: ICD-10-CM

## 2021-04-05 RX ORDER — MORPHINE SULFATE 10 MG/5ML
4 SOLUTION ORAL 3 TIMES DAILY
Qty: 360 ML | Refills: 0 | Status: SHIPPED | OUTPATIENT
Start: 2021-04-06 | End: 2021-05-10

## 2021-04-05 NOTE — PROGRESS NOTES
Received VM from patient requesting refill of morphine.     Last refill: 3/9/2021  Last office visit: 2/25/2021  Scheduled for follow up 4/15/2021    Will route request to MD for review.     Reviewed [place]  Report.

## 2021-04-12 ENCOUNTER — VIRTUAL VISIT (OUTPATIENT)
Dept: ONCOLOGY | Facility: CLINIC | Age: 61
End: 2021-04-12
Payer: COMMERCIAL

## 2021-04-12 DIAGNOSIS — Z51.5 ENCOUNTER FOR PALLIATIVE CARE: ICD-10-CM

## 2021-04-12 DIAGNOSIS — F43.10 POSTTRAUMATIC STRESS DISORDER: Primary | ICD-10-CM

## 2021-04-12 PROCEDURE — 999N000102 HC STATISTIC NO CHARGE CLINIC VISIT: Mod: TEL | Performed by: SOCIAL WORKER

## 2021-04-12 NOTE — PROGRESS NOTES
Follow up session with Garry by phone this morning.  Garry reports that he feels he has coping tools needed at this time.  No needs identified and so we did not proceed with session.  Garry has support from his PCA as well as his psychiatrist.  He is aware of how to contact me if needs change.     BILL Gardner, [facility]   Palliative Care Clinical   Ph: [phone]

## 2021-04-12 NOTE — LETTER
4/12/2021         RE: Wilbert Hutchins  [address]  [facility] [address]        Dear Colleague,    Thank you for referring your patient, Wilbert Hutchins, to the [facility]. Please see a copy of my visit note below.    Follow up session with Garry by phone this morning.  Garry reports that he feels he has coping tools needed at this time.  No needs identified and so we did not proceed with session.  Garry has support from his PCA as well as his psychiatrist.  He is aware of how to contact me if needs change.     BILL Gardner, [facility]   Palliative Care Clinical   Ph: [phone]        Again, thank you for allowing me to participate in the care of your patient.        Sincerely,        DANTE Gardner

## 2021-04-13 NOTE — PROGRESS NOTES
"Garry is a 61 year old who is being evaluated via a billable telephone visit.      What phone number would you like to be contacted at? [phone]  How would you like to obtain your AVS? [place]     Vitals - Patient Reported  Weight (Patient Reported): 98 kg (216 lb)  Height (Patient Reported): 177.8 cm (5' 10\")  BMI (Based on Pt Reported Ht/Wt): 30.99  Pain Score: Severe Pain (6)    Olimpia Lorenzo CMA April 15, 2021  11:12 AM     Phone call duration: 6 minutes    Palliative Care Outpatient Clinic Progress Note    Patient Name:  Wilbert Hutchins  Primary Provider:  Michelle Tipton    Chief Complaint/Patient ID:  Wilbert Hutchins is a 59yo M with a history of ILD on 3L O2   - started on chronic opioids for dyspnea 12/2020  Limited mobility due to foot injury and overall health     Last Palliative Care Appointment:   2/25/2021    Interim History:  Wilbert Hutchins 60 year old male returns to be seen by palliative care today.  Visit performed by telephone due to coronavirus pandemic.      [facility] no further needs for now.     Having a more inflammation in his hand today from his CTD.   Has followed up with his rheumatologist.      Breathing feeling more labored. Getting around with his scooter, will feel very short of breath after walking in his home.  Mostly in chair during the day, feels leaning back seems to help his breathing more than sitting up.   Taking morphine 8 mg three times/day.  No constipation, no sedation.  Feels his anxiety is \"under check\", no depressive symptoms.      Completed coronavirus vaccine.     No new concerns.      Social History:  Pertinent changes to social history/social situation since last visit: None  Lives with SO Eve, together 18 years  Patient has 5 children including, ranging from mid 20s to 30s and age.  Previous  service   Daily use of nonmedical cannabis, has been using since age 18, cigarettes.  " "Denies recent use of any other recreational or illicit drugs, sober since 2008  [employer], not formally attending [facility]  Advance Directive Status:  Has completed but not in our system, would want partner Myra to make medical decisions  Social History     Tobacco Use     Smoking status: Current Some Day Smoker     Packs/day: 0.75     Types: Cigarettes     Smokeless tobacco: Never Used     Tobacco comment: cutting way down   Substance Use Topics     Alcohol use: No     Drug use: Yes     Allergies   Allergen Reactions     Trace Metals Dermatitis, Muscle Pain (Myalgia), Rash and Swelling        Current Outpatient Medications   Medication Sig Dispense Refill     albuterol (PROAIR HFA, PROVENTIL HFA, VENTOLIN HFA) 108 (90 BASE) MCG/ACT inhaler Inhale 2 puffs into the lungs every 6 hours as needed for shortness of breath / dyspnea 1 Inhaler 5     BD INSULIN SYRINGE U/F 31G X 5/16\" 1 ML miscellaneous   11     cetirizine (ZYRTEC) 10 MG tablet Take 10 mg by mouth       cholecalciferol 1000 units TABS Take 1,000 Units by mouth daily       fluticasone (FLONASE) 50 MCG/ACT nasal spray Spray 1 spray in nostril       folic acid (FOLVITE) 1 MG tablet Take 1 mg by mouth       gabapentin (NEURONTIN) 300 MG capsule Take 600 mg by mouth       hydroxychloroquine (PLAQUENIL) 200 MG tablet Take 200 mg by mouth       ibuprofen (IBU) 800 MG tablet TAKE ONE TABLET BY MOUTH FOUR TIMES DAILY AS NEEDED FOR PAIN       insulin NPH (NOVOLIN N VIAL) 100 UNIT/ML vial Inject 10 Units Subcutaneous       insulin syringe-needle U-100 (ELITE-THIN INSULIN SYRINGE) 31G X 5/16\" 1 ML miscellaneous Use to inject insulin as directed.       morphine 10 MG/5ML solution Take 4 mLs (8 mg) by mouth 3 times daily 30 day supply 360 mL 0     naloxone (NARCAN) 4 MG/0.1ML nasal spray Spray 1 spray (4 mg) into one nostril alternating nostrils as needed for opioid reversal every 2-3 minutes until assistance arrives 0.2 mL 0     order for DME Upadated oxygen: Patient " requires supplemental Oxygen 2 LPM via nasal canula with activity and nocturnally. Oxygen will be for a lifetime. 1 Device 0     predniSONE (DELTASONE) 10 MG tablet Take 13 mg by mouth       ranitidine (ZANTAC) 150 MG tablet   3     sennosides (SENOKOT) 8.6 MG tablet Take 1-2 tablets by mouth daily 90 tablet 3     sertraline (ZOLOFT) 100 MG tablet Take 200 mg by mouth       simvastatin (ZOCOR) 40 MG tablet   3     sulfamethoxazole-trimethoprim (BACTRIM/SEPTRA) 400-80 MG tablet Take 1 tablet by mouth         Review of Systems:  Palliative Symptom Review (0=no symptom/no concern, 1=mild, 2=moderate, 3=severe):      Pain: 1      Fatigue: 3      Depressive Symptoms: 1      Anxiety: 1      Drowsiness: 0      Poor Appetite: 1      Shortness of Breath: 2-3      Other: 0      Overall (0 good/no concerns, 3 very poor):  1    No physical exam due to phone visit.  Able to speak in complete sentences, unlabored, mood euthymic.        Wt Readings from Last 10 Encounters:   12/03/20 100 kg (220 lb 6.4 oz)   11/19/20 99.3 kg (219 lb)   12/18/19 100.2 kg (221 lb)   09/25/19 100.2 kg (221 lb)   07/19/19 102.4 kg (225 lb 11.2 oz)   05/31/19 104.3 kg (230 lb)   01/12/15 93.4 kg (206 lb)   01/08/15 94 kg (207 lb 3.2 oz)   12/11/13 87.7 kg (193 lb 6.4 oz)   11/26/13 85.7 kg (189 lb)       [facility] Outpatient Palliative Care Opioid Prescribing Safety Plan    Opioid Safety Education: Reviewed by Jackie Oliveira MD on 12/3/2020  Opioid Risk Assessment: Performed by Jackie Oliveira MD on 12/3/2020.  ORT score of 14.   Mood Disorder Assessment: Performed by Jackie Oliveira MD on 12/3/2020.     reviewed with every prescription, last reviewed by Jackie Oliveira MD on 04/15/2021     Prognosis >1 year  Risk: High (ORT>7)  Indication for Opioid Use: Moderate or Strong  Baseline and Ongoing UDT last performed on:12/3/2020  Naloxone Script provided on: 12/3/2020.   Pill Counts to be performed, plan & last pill count: liquid, has been doing well  w 2 week refills, no issues on monthly refills since 2/2021  Indications for Tapering reviewed: Not appropriate today  Counseling Support: reviewed 2/25/21    Key Data Reviewed:  Cre 0.79,     Impression & Recommendations & Counseling:  Wilbert Hutchins 60 year old male seen today for follow-up with palliative care and discussion of opioids for dyspnea.  Garry is very forthcoming about his past, and we've discussed safety with this medication extensively, and has used less than prescribed.  He is aware this can be high risk with his addiction history, says today quality of life is most important to him and would be willing to try opioids for breathlessness interfering with his ADLs.  Feels improvement with 8 mg TID, self-managing meds with no concerns.      Interstitial lung disease, breathlessness, OIC, coping  Has had functional decline over the last year, now mostly sitting through the day and with dyspnea while performing ADLs.  Given his significant dyspnea on mild exertion, with the fact that he is not interested in stopping smoking pot of cigarettes, he knows he would not be a transplant candidate.   He describes feelings of panic when he can't catch his breath, which can happen 3 or 4 times/day. Could not tell much difference with morphine 5 mg PO BID, dyspnea improved 6/10->2/10 with morphine 8 mg TID  - Continue morphine to 8 mg TID  - Has not had an aberrant behavior with monthly refills  - Continue senna as-needed to keep an easy BM every 1-2 days  - Have discussed prognosis prognosis - had previously been told he had 3-5 years to live, but that was 4 years ago.  I think he's likely in the 1-few years range now, and we talked about the expected progression of chronic lung disease.      (This note was transcribed using voice recognition software. While I review and edit the transcription, I may miss errors, and the software sometimes does unexpected capitalizations and formatting that I miss.  Please let me know of any serious mistranscriptions and I will addend this note.)      RTC in 3 months    Jackie Oliveira MD  Palliative Medicine  Pager [phone]

## 2021-04-15 ENCOUNTER — VIRTUAL VISIT (OUTPATIENT)
Dept: PALLIATIVE CARE | Facility: CLINIC | Age: 61
End: 2021-04-15
Attending: INTERNAL MEDICINE
Payer: COMMERCIAL

## 2021-04-15 DIAGNOSIS — R06.02 SHORTNESS OF BREATH: Primary | ICD-10-CM

## 2021-04-15 DIAGNOSIS — J84.9 ILD (INTERSTITIAL LUNG DISEASE) (H): ICD-10-CM

## 2021-04-15 PROCEDURE — 99214 OFFICE O/P EST MOD 30 MIN: CPT | Mod: TEL | Performed by: INTERNAL MEDICINE

## 2021-04-15 PROCEDURE — 999N000109 HC STATISTIC OP CR VISIT

## 2021-04-15 NOTE — LETTER
"4/15/2021       RE: Wilbert Hutchins  [address]  [place] [address]     Dear Colleague,    Thank you for referring your patient, Wilbert Hutchins, to the [facility] at [facility]. Please see a copy of my visit note below.    Garry is a 61 year old who is being evaluated via a billable telephone visit.      What phone number would you like to be contacted at? [phone]  How would you like to obtain your AVS? MyChart     Vitals - Patient Reported  Weight (Patient Reported): 98 kg (216 lb)  Height (Patient Reported): 177.8 cm (5' 10\")  BMI (Based on Pt Reported Ht/Wt): 30.99  Pain Score: Severe Pain (6)    Olimpia Lorenzo CMA April 15, 2021  11:12 AM     Phone call duration: 6 minutes    Palliative Care Outpatient Clinic Progress Note    Patient Name:  Wilbert Hutchins  Primary Provider:  Michelle Tipton    Chief Complaint/Patient ID:  Wilbert Hutchins is a 59yo M with a history of ILD on 3L O2   - started on chronic opioids for dyspnea 12/2020  Limited mobility due to foot injury and overall health     Last Palliative Care Appointment:   2/25/2021    Interim History:  Wilbert Hutchins 60 year old male returns to be seen by palliative care today.  Visit performed by telephone due to coronavirus pandemic.      [facility] no further needs for now.     Having a more inflammation in his hand today from his CTD.   Has followed up with his rheumatologist.      Breathing feeling more labored. Getting around with his scooter, will feel very short of breath after walking in his home.  Mostly in chair during the day, feels leaning back seems to help his breathing more than sitting up.   Taking morphine 8 mg three times/day.  No constipation, no sedation.  Feels his anxiety is \"under check\", no depressive symptoms.      Completed coronavirus vaccine.     No new " "concerns.      Social History:  Pertinent changes to social history/social situation since last visit: None  Lives with ALETHA Prado, together 18 years  Patient has 5 children including, ranging from mid 20s to 30s and age.  Previous  service   Daily use of nonmedical cannabis, has been using since age 18, cigarettes.  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[employer]  Advance Directive Status:  Has completed but not in our system, would want partner Myra to make medical decisions  Social History     Tobacco Use     Smoking status: Current Some Day Smoker     Packs/day: 0.75     Types: Cigarettes     Smokeless tobacco: Never Used     Tobacco comment: cutting way down   Substance Use Topics     Alcohol use: No     Drug use: Yes     Allergies   Allergen Reactions     Trace Metals Dermatitis, Muscle Pain (Myalgia), Rash and Swelling        Current Outpatient Medications   Medication Sig Dispense Refill     albuterol (PROAIR HFA, PROVENTIL HFA, VENTOLIN HFA) 108 (90 BASE) MCG/ACT inhaler Inhale 2 puffs into the lungs every 6 hours as needed for shortness of breath / dyspnea 1 Inhaler 5     BD INSULIN SYRINGE U/F 31G X 5/16\" 1 ML miscellaneous   11     cetirizine (ZYRTEC) 10 MG tablet Take 10 mg by mouth       cholecalciferol 1000 units TABS Take 1,000 Units by mouth daily       fluticasone (FLONASE) 50 MCG/ACT nasal spray Spray 1 spray in nostril       folic acid (FOLVITE) 1 MG tablet Take 1 mg by mouth       gabapentin (NEURONTIN) 300 MG capsule Take 600 mg by mouth       hydroxychloroquine (PLAQUENIL) 200 MG tablet Take 200 mg by mouth       ibuprofen (IBU) 800 MG tablet TAKE ONE TABLET BY MOUTH FOUR TIMES DAILY AS NEEDED FOR PAIN       insulin NPH (NOVOLIN N VIAL) 100 UNIT/ML vial Inject 10 Units Subcutaneous       insulin syringe-needle U-100 (ELITE-THIN INSULIN SYRINGE) 31G X 5/16\" 1 ML miscellaneous Use to inject insulin as directed.       " morphine 10 MG/5ML solution Take 4 mLs (8 mg) by mouth 3 times daily 30 day supply 360 mL 0     naloxone (NARCAN) 4 MG/0.1ML nasal spray Spray 1 spray (4 mg) into one nostril alternating nostrils as needed for opioid reversal every 2-3 minutes until assistance arrives 0.2 mL 0     order for DME Upadated oxygen: Patient requires supplemental Oxygen 2 LPM via nasal canula with activity and nocturnally. Oxygen will be for a lifetime. 1 Device 0     predniSONE (DELTASONE) 10 MG tablet Take 13 mg by mouth       ranitidine (ZANTAC) 150 MG tablet   3     sennosides (SENOKOT) 8.6 MG tablet Take 1-2 tablets by mouth daily 90 tablet 3     sertraline (ZOLOFT) 100 MG tablet Take 200 mg by mouth       simvastatin (ZOCOR) 40 MG tablet   3     sulfamethoxazole-trimethoprim (BACTRIM/SEPTRA) 400-80 MG tablet Take 1 tablet by mouth         Review of Systems:  Palliative Symptom Review (0=no symptom/no concern, 1=mild, 2=moderate, 3=severe):      Pain: 1      Fatigue: 3      Depressive Symptoms: 1      Anxiety: 1      Drowsiness: 0      Poor Appetite: 1      Shortness of Breath: 2-3      Other: 0      Overall (0 good/no concerns, 3 very poor):  1    No physical exam due to phone visit.  Able to speak in complete sentences, unlabored, mood euthymic.        Wt Readings from Last 10 Encounters:   12/03/20 100 kg (220 lb 6.4 oz)   11/19/20 99.3 kg (219 lb)   12/18/19 100.2 kg (221 lb)   09/25/19 100.2 kg (221 lb)   07/19/19 102.4 kg (225 lb 11.2 oz)   05/31/19 104.3 kg (230 lb)   01/12/15 93.4 kg (206 lb)   01/08/15 94 kg (207 lb 3.2 oz)   12/11/13 87.7 kg (193 lb 6.4 oz)   11/26/13 85.7 kg (189 lb)       [facility] Outpatient Palliative Care Opioid Prescribing Safety Plan    Opioid Safety Education: Reviewed by Jackie Oliveira MD on 12/3/2020  Opioid Risk Assessment: Performed by Jackie Oliveira MD on 12/3/2020.  ORT score of 14.   Mood Disorder Assessment: Performed by Jackie Oliveira MD on 12/3/2020.     reviewed with every  prescription, last reviewed by Jackie Oliveira MD on 04/15/2021     Prognosis >1 year  Risk: High (ORT>7)  Indication for Opioid Use: Moderate or Strong  Baseline and Ongoing UDT last performed on:12/3/2020  Naloxone Script provided on: 12/3/2020.   Pill Counts to be performed, plan & last pill count: liquid, has been doing well w 2 week refills, no issues on monthly refills since 2/2021  Indications for Tapering reviewed: Not appropriate today  Counseling Support: reviewed 2/25/21    Key Data Reviewed:  Cre 0.79,     Impression & Recommendations & Counseling:  Wilbert Hutchins 60 year old male seen today for follow-up with palliative care and discussion of opioids for dyspnea.  Garry is very forthcoming about his past, and we've discussed safety with this medication extensively, and has used less than prescribed.  He is aware this can be high risk with his addiction history, says today quality of life is most important to him and would be willing to try opioids for breathlessness interfering with his ADLs.  Feels improvement with 8 mg TID, self-managing meds with no concerns.      Interstitial lung disease, breathlessness, OIC, coping  Has had functional decline over the last year, now mostly sitting through the day and with dyspnea while performing ADLs.  Given his significant dyspnea on mild exertion, with the fact that he is not interested in stopping smoking pot of cigarettes, he knows he would not be a transplant candidate.   He describes feelings of panic when he can't catch his breath, which can happen 3 or 4 times/day. Could not tell much difference with morphine 5 mg PO BID, dyspnea improved 6/10->2/10 with morphine 8 mg TID  - Continue morphine to 8 mg TID  - Has not had an aberrant behavior with monthly refills  - Continue senna as-needed to keep an easy BM every 1-2 days  - Have discussed prognosis prognosis - had previously been told he had 3-5 years to live, but that was 4 years ago.  I  think he's likely in the 1-few years range now, and we talked about the expected progression of chronic lung disease.      (This note was transcribed using voice recognition software. While I review and edit the transcription, I may miss errors, and the software sometimes does unexpected capitalizations and formatting that I miss. Please let me know of any serious mistranscriptions and I will addend this note.)      RTC in 3 months    Jackie Oliveira MD  Palliative Medicine  Pager [phone]

## 2021-05-10 ENCOUNTER — PATIENT OUTREACH (OUTPATIENT)
Dept: PALLIATIVE CARE | Facility: CLINIC | Age: 61
End: 2021-05-10

## 2021-05-10 DIAGNOSIS — J84.9 ILD (INTERSTITIAL LUNG DISEASE) (H): ICD-10-CM

## 2021-05-10 DIAGNOSIS — R06.02 SHORTNESS OF BREATH: ICD-10-CM

## 2021-05-10 RX ORDER — MORPHINE SULFATE 10 MG/5ML
4 SOLUTION ORAL 3 TIMES DAILY
Qty: 360 ML | Refills: 0 | Status: SHIPPED | OUTPATIENT
Start: 2021-05-10 | End: 2021-06-07

## 2021-05-10 NOTE — PROGRESS NOTES
Received VM from patient requesting refill of morphine.     Last refill: 4/6/2021  Last office visit: 4/15/2021  Scheduled for follow up 7/21/2021    Will route request to MD for review.     Reviewed [place]  Report.

## 2021-05-12 ASSESSMENT — ENCOUNTER SYMPTOMS
WHEEZING: 1
COUGH: 1
COUGH DISTURBING SLEEP: 1
DYSPNEA ON EXERTION: 1
HEMOPTYSIS: 0
DECREASED APPETITE: 1
SPUTUM PRODUCTION: 1
FATIGUE: 1
POSTURAL DYSPNEA: 1
SNORES LOUDLY: 0
SHORTNESS OF BREATH: 1

## 2021-05-26 ENCOUNTER — OFFICE VISIT (OUTPATIENT)
Dept: PULMONOLOGY | Facility: CLINIC | Age: 61
End: 2021-05-26
Attending: INTERNAL MEDICINE
Payer: COMMERCIAL

## 2021-05-26 VITALS
OXYGEN SATURATION: 97 % | HEART RATE: 52 BPM | SYSTOLIC BLOOD PRESSURE: 126 MMHG | BODY MASS INDEX: 30.56 KG/M2 | DIASTOLIC BLOOD PRESSURE: 84 MMHG | WEIGHT: 213 LBS

## 2021-05-26 DIAGNOSIS — J96.11 CHRONIC HYPOXEMIC RESPIRATORY FAILURE (H): ICD-10-CM

## 2021-05-26 DIAGNOSIS — Z72.0 CURRENT TOBACCO USE: ICD-10-CM

## 2021-05-26 DIAGNOSIS — J84.9 ILD (INTERSTITIAL LUNG DISEASE) (H): Primary | ICD-10-CM

## 2021-05-26 PROCEDURE — G0463 HOSPITAL OUTPT CLINIC VISIT: HCPCS

## 2021-05-26 PROCEDURE — 99213 OFFICE O/P EST LOW 20 MIN: CPT | Performed by: INTERNAL MEDICINE
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
## 2021-06-07 ENCOUNTER — PATIENT OUTREACH (OUTPATIENT)
Dept: PALLIATIVE CARE | Facility: CLINIC | Age: 61
End: 2021-06-07

## 2021-06-07 DIAGNOSIS — J84.9 ILD (INTERSTITIAL LUNG DISEASE) (H): ICD-10-CM

## 2021-06-07 DIAGNOSIS — R06.02 SHORTNESS OF BREATH: ICD-10-CM

## 2021-06-07 RX ORDER — MORPHINE SULFATE 10 MG/5ML
4 SOLUTION ORAL 3 TIMES DAILY
Qty: 360 ML | Refills: 0 | Status: SHIPPED | OUTPATIENT
Start: 2021-06-07 | End: 2021-07-07

## 2021-07-07 ENCOUNTER — PATIENT OUTREACH (OUTPATIENT)
Dept: PALLIATIVE CARE | Facility: CLINIC | Age: 61
End: 2021-07-07

## 2021-07-07 DIAGNOSIS — R06.02 SHORTNESS OF BREATH: ICD-10-CM

## 2021-07-07 DIAGNOSIS — J84.9 ILD (INTERSTITIAL LUNG DISEASE) (H): ICD-10-CM

## 2021-07-07 RX ORDER — MORPHINE SULFATE 10 MG/5ML
4 SOLUTION ORAL 3 TIMES DAILY
Qty: 360 ML | Refills: 0 | Status: SHIPPED | OUTPATIENT
Start: 2021-07-07 | End: 2021-07-28

## 2021-07-07 RX ORDER — MORPHINE SULFATE 10 MG/5ML
4 SOLUTION ORAL 3 TIMES DAILY
Qty: 360 ML | Refills: 0 | Status: SHIPPED | OUTPATIENT
Start: 2021-07-07 | End: 2021-07-07

## 2021-07-07 NOTE — PROGRESS NOTES
Received VM from patient requesting refill of morphine.     Last refill: 6/7/2021  Last office visit: 4/15/2021  Scheduled for follow up 7/21/2021     Will route request to MD for review.     Reviewed [place]  Report.

## 2021-07-09 NOTE — PROGRESS NOTES
"  Garry is a 61 year old who is being evaluated via a billable telephone visit.      What phone number would you like to be contacted at? [phone]    How would you like to obtain your AVS? Leonel   Vitals - Patient Reported  Weight (Patient Reported): 97.1 kg (214 lb)  Height (Patient Reported): 177.8 cm (5' 10\")  BMI (Based on Pt Reported Ht/Wt): 30.71  Pain Score: Mild Pain (3)  Pain Loc:  (SORE WRISTS, FOREARMS, BACK)    Mart Cruz LPN    Telephone visit duration:  28 minutes    Palliative Care Outpatient Clinic Progress Note    Patient Name:  Wilbert Hutchins  Primary Provider:  Michelle Tipton    Chief Complaint/Patient ID:  Wilbert Hutchins is a 62yo M with a history of ILD on 3L O2   - started on chronic opioids for dyspnea 12/2020 for dyspnea affecting ADLs, mainly using scooter for mobility  +RF CTD  Limited mobility due to foot injury and overall health     Last Palliative Care Appointment:   4/15/21    Interim History:  Wilbert Hutchins 61 year old male returns to be seen by palliative care today.  Visit performed by telephone due to coronavirus pandemic.      Recently saw primary care at [facility] for lift chair order, had quarterly visit with [facility] Care Coordinator RN.  Having increased O2 requirement and dyspnea at last visit with Dr. Lin.      Says today he is so-so, describes that he feels his lung disease is progressing, if walking with 4L O2 saturation will drop into the 80s.  Has been trying to budget energy - got railing put in for walkway, using scooter for mobility.     Will get \"involuntary gasps\" where he'll be sitting at rest and feel the need to gasp for air, happens multiple times/day.  He keeps oximeter with him, and will check and still be low 90s.  Says he notices the morphine lasts about 6 hours, then can feel his breathing get harder.  Asks if this can be adjusted.       Yesterday had to stop walking at about 80 feet and rest to catch breath when visiting " "sister. Able to do ADLs with effort.     Social History:  Pertinent changes to social history/social situation since last visit: None  Lives with ALETHA Prado together 18 years  Patient has 5 children ranging from mid 20s to 30s   Previous  service  Previous illicit drug use, severe alcohol use disorder. Daily use of nonmedical cannabis, has been using since age 18, cigarettes.  Denies recent use of any other recreational or illicit drugs, no alcohol use since since 2008  [employer]  Advance Directive Status:  Has completed but not in our system, would want partner Myra to make medical decisions  Social History     Tobacco Use     Smoking status: Current Some Day Smoker     Packs/day: 0.75     Types: Cigarettes     Smokeless tobacco: Never Used     Tobacco comment: cutting way down   Substance Use Topics     Alcohol use: No     Drug use: Yes     Allergies   Allergen Reactions     Trace Metals Dermatitis, Muscle Pain (Myalgia), Rash and Swelling        Current Outpatient Medications   Medication Sig Dispense Refill     albuterol (PROAIR HFA, PROVENTIL HFA, VENTOLIN HFA) 108 (90 BASE) MCG/ACT inhaler Inhale 2 puffs into the lungs every 6 hours as needed for shortness of breath / dyspnea 1 Inhaler 5     BD INSULIN SYRINGE U/F 31G X 5/16\" 1 ML miscellaneous   11     cetirizine (ZYRTEC) 10 MG tablet Take 10 mg by mouth       cholecalciferol 1000 units TABS Take 1,000 Units by mouth daily       fluticasone (FLONASE) 50 MCG/ACT nasal spray Spray 1 spray in nostril       gabapentin (NEURONTIN) 300 MG capsule Take 600 mg by mouth       hydroxychloroquine (PLAQUENIL) 200 MG tablet Take 200 mg by mouth       ibuprofen (IBU) 800 MG tablet TAKE ONE TABLET BY MOUTH FOUR TIMES DAILY AS NEEDED FOR PAIN       insulin NPH (NOVOLIN N VIAL) 100 UNIT/ML vial Inject 10 Units Subcutaneous       morphine 10 MG/5ML solution Take 4 mLs (8 mg) by mouth 3 times daily 30 day supply 360 mL 0     naloxone (NARCAN) 4 MG/0.1ML nasal spray Spray " 1 spray (4 mg) into one nostril alternating nostrils as needed for opioid reversal every 2-3 minutes until assistance arrives (Patient not taking: Reported on 4/15/2021) 0.2 mL 0     order for DME Upadated oxygen: Patient requires supplemental Oxygen 2 LPM via nasal canula with activity and nocturnally. Oxygen will be for a lifetime. 1 Device 0     predniSONE (DELTASONE) 10 MG tablet Take 13 mg by mouth       ranitidine (ZANTAC) 150 MG tablet   3     sertraline (ZOLOFT) 100 MG tablet Take 200 mg by mouth       simvastatin (ZOCOR) 40 MG tablet   3       Review of Systems:  Palliative Symptom Review (0=no symptom/no concern, 1=mild, 2=moderate, 3=severe):      Pain: 1      Fatigue: 3      Depressive Symptoms: 1      Anxiety: 1      Drowsiness: 0      Poor Appetite: 1 - feels is lower, weight about stable      Shortness of Breath: 2-3      Other: 0      Overall (0 good/no concerns, 3 very poor):  1    No physical exam due to phone visit.  Able to speak in complete sentences, unlabored, mood euthymic.      Says thinks weight is about 213  Wt Readings from Last 5 Encounters:   05/26/21 96.6 kg (213 lb)   12/03/20 100 kg (220 lb 6.4 oz)   11/19/20 99.3 kg (219 lb)   12/18/19 100.2 kg (221 lb)   09/25/19 100.2 kg (221 lb)       [facility] Outpatient Palliative Care Opioid Prescribing Safety Plan    Opioid Safety Education: Reviewed by Jackie Oliveira MD on 12/3/2020  Opioid Risk Assessment: Performed by Jackie Oliveira MD on 12/3/2020.  ORT score of 14.   Mood Disorder Assessment: Performed by Jackie Oliveira MD on 12/3/2020.     reviewed with every prescription, last reviewed by Jackie Oliveira MD on 07/21/2021     Prognosis >1 year  Risk: High (ORT>7)  Indication for Opioid Use: Moderate or Strong  Baseline and Ongoing UDT last performed on:12/3/2020  Naloxone Script provided on: 12/3/2020.   Pill Counts to be performed, plan & last pill count: liquid, did well w 2 week refills, no issues on monthly refills since  2/2021  Indications for Tapering reviewed: Not appropriate today  Counseling Support: reviewed 2/25/21, has seen Palliative LICSW and no ongoing needs    Key Data Reviewed:  Last Chemistry 3/2/21 via Care Everywhere  Cre 1.12, GFR >60  CO2   29    Impression & Recommendations & Counseling:  Wilbert Hutchins 61 year old male seen today for follow-up, visit focused on dyspnea, disease understanding, and prognosis today. Having slowly progressive dyspnea and functional decline.  Appetite down some, weight stable.      Dyspnea, on chronic opioids, high risk due to history  - Has had improvement in symptoms with opioids, has managed well without concerns on scheduled TID morphine dosing.  Having worsened dyspnea, has needed to go up on oxygen and getting more DME for home  - Discussed changing to 15 mg MS Contin BID - will be ~25% increase.  Stop morphine liquid when he starts the MS Contin     Interstitial lung disease  Has had functional decline over the last year, now mostly sitting through the day and with dyspnea while performing ADLs.  Given his significant dyspnea on mild exertion, with the fact that he is not interested in stopping smoking marijuana or cigarettes, he knows he would not be a transplant candidate.  -  Feels overall, would want treatment focused on comfort, knows his lung disease will slowly progress, and has found discussion of what to expect in the future helpful.  Has had slow decline and has been increasingly symptomatic.  Discussed today that I think he is likely having progression, and it may be reasonable to involve hospice at some point in the next year if he continues to have more difficulty with ADLs, more sedentary time and dyspnea at rest.  Briefly introduced hospice. Doesn't feel he needs this today, and I think prognosis likely >1 year at his point.   - Has HCD at [facility], DNR    (This note was transcribed using voice recognition software. While I review and edit the transcription,  I may miss errors, and the software sometimes does unexpected capitalizations and formatting that I miss. Please let me know of any serious mistranscriptions and I will addend this note.)    RTC in 6 weeks    Jackie Oliveira MD  Palliative Medicine  Pager [phone]

## 2021-07-21 ENCOUNTER — VIRTUAL VISIT (OUTPATIENT)
Dept: PALLIATIVE CARE | Facility: CLINIC | Age: 61
End: 2021-07-21
Attending: INTERNAL MEDICINE
Payer: COMMERCIAL

## 2021-07-21 DIAGNOSIS — R06.02 SHORTNESS OF BREATH: Primary | ICD-10-CM

## 2021-07-21 DIAGNOSIS — J84.9 ILD (INTERSTITIAL LUNG DISEASE) (H): ICD-10-CM

## 2021-07-21 PROCEDURE — 999N000109 HC STATISTIC OP CR VISIT

## 2021-07-21 PROCEDURE — 99214 OFFICE O/P EST MOD 30 MIN: CPT | Mod: TEL | Performed by: INTERNAL MEDICINE

## 2021-07-21 NOTE — LETTER
"7/21/2021       RE: Wilbert Hutchins  [address]  [place] [facility] [address]     Dear Colleague,    Thank you for referring your patient, Wilbert Hutchins, to the [facility] at [facility]. Please see a copy of my visit note below.    Vitals - Patient Reported  Weight (Patient Reported): 97.1 kg (214 lb)  Height (Patient Reported): 177.8 cm (5' 10\")  BMI (Based on Pt Reported Ht/Wt): 30.71  Pain Score: Mild Pain (3)  Pain Loc:  (SORE WRISTS, FOREARMS, BACK)    Mart Cruz LPN    Telephone visit duration:  28 minutes    Palliative Care Outpatient Clinic Progress Note    Patient Name:  Wilbert Hutchins  Primary Provider:  Michelle Tipton    Chief Complaint/Patient ID:  Wilbert Hutchins is a 62yo M with a history of ILD on 3L O2   - started on chronic opioids for dyspnea 12/2020 for dyspnea affecting ADLs, mainly using scooter for mobility  +RF CTD  Limited mobility due to foot injury and overall health     Last Palliative Care Appointment:   4/15/21    Interim History:  Wilbert Hutchins 61 year old male returns to be seen by palliative care today.  Visit performed by telephone due to coronavirus pandemic.      Recently saw primary care at [facility] for lift chair order, had quarterly visit with [facility] Care Coordinator RN.  Having increased O2 requirement and dyspnea at last visit with Dr. Lin.      Says today he is so-so, describes that he feels his lung disease is progressing, if walking with 4L O2 saturation will drop into the 80s.  Has been trying to budget energy - got railing put in for walkway, using scooter for mobility.     Will get \"involuntary gasps\" where he'll be sitting at rest and feel the need to gasp for air, happens multiple times/day.  He keeps oximeter with him, and will check and still be low 90s.  Says he notices the morphine lasts about 6 hours, then can feel his breathing get harder.  " "Asks if this can be adjusted.       Yesterday had to stop walking at about 80 feet and rest to catch breath when visiting sister. Able to do ADLs with effort.     Social History:  Pertinent changes to social history/social situation since last visit: None  Lives with ALETHA Prado together 18 years  Patient has 5 children ranging from mid 20s to 30s   Previous  service  Previous illicit drug use, severe alcohol use disorder. Daily use of nonmedical cannabis, has been using since age 18, cigarettes.  Denies recent use of any other recreational or illicit drugs, no alcohol use since since 2008  [employer]  Advance Directive Status:  Has completed but not in our system, would want partner Myra to make medical decisions  Social History     Tobacco Use     Smoking status: Current Some Day Smoker     Packs/day: 0.75     Types: Cigarettes     Smokeless tobacco: Never Used     Tobacco comment: cutting way down   Substance Use Topics     Alcohol use: No     Drug use: Yes     Allergies   Allergen Reactions     Trace Metals Dermatitis, Muscle Pain (Myalgia), Rash and Swelling        Current Outpatient Medications   Medication Sig Dispense Refill     albuterol (PROAIR HFA, PROVENTIL HFA, VENTOLIN HFA) 108 (90 BASE) MCG/ACT inhaler Inhale 2 puffs into the lungs every 6 hours as needed for shortness of breath / dyspnea 1 Inhaler 5     BD INSULIN SYRINGE U/F 31G X 5/16\" 1 ML miscellaneous   11     cetirizine (ZYRTEC) 10 MG tablet Take 10 mg by mouth       cholecalciferol 1000 units TABS Take 1,000 Units by mouth daily       fluticasone (FLONASE) 50 MCG/ACT nasal spray Spray 1 spray in nostril       gabapentin (NEURONTIN) 300 MG capsule Take 600 mg by mouth       hydroxychloroquine (PLAQUENIL) 200 MG tablet Take 200 mg by mouth       ibuprofen (IBU) 800 MG tablet TAKE ONE TABLET BY MOUTH FOUR TIMES DAILY AS NEEDED FOR PAIN       insulin NPH (NOVOLIN N VIAL) 100 UNIT/ML vial Inject 10 Units Subcutaneous       morphine 10 MG/5ML " solution Take 4 mLs (8 mg) by mouth 3 times daily 30 day supply 360 mL 0     naloxone (NARCAN) 4 MG/0.1ML nasal spray Spray 1 spray (4 mg) into one nostril alternating nostrils as needed for opioid reversal every 2-3 minutes until assistance arrives (Patient not taking: Reported on 4/15/2021) 0.2 mL 0     order for DME Upadated oxygen: Patient requires supplemental Oxygen 2 LPM via nasal canula with activity and nocturnally. Oxygen will be for a lifetime. 1 Device 0     predniSONE (DELTASONE) 10 MG tablet Take 13 mg by mouth       ranitidine (ZANTAC) 150 MG tablet   3     sertraline (ZOLOFT) 100 MG tablet Take 200 mg by mouth       simvastatin (ZOCOR) 40 MG tablet   3       Review of Systems:  Palliative Symptom Review (0=no symptom/no concern, 1=mild, 2=moderate, 3=severe):      Pain: 1      Fatigue: 3      Depressive Symptoms: 1      Anxiety: 1      Drowsiness: 0      Poor Appetite: 1 - feels is lower, weight about stable      Shortness of Breath: 2-3      Other: 0      Overall (0 good/no concerns, 3 very poor):  1    No physical exam due to phone visit.  Able to speak in complete sentences, unlabored, mood euthymic.      Says thinks weight is about 213  Wt Readings from Last 5 Encounters:   05/26/21 96.6 kg (213 lb)   12/03/20 100 kg (220 lb 6.4 oz)   11/19/20 99.3 kg (219 lb)   12/18/19 100.2 kg (221 lb)   09/25/19 100.2 kg (221 lb)       [facility] Outpatient Palliative Care Opioid Prescribing Safety Plan    Opioid Safety Education: Reviewed by Jackie Oliveira MD on 12/3/2020  Opioid Risk Assessment: Performed by Jackie Oliveira MD on 12/3/2020.  ORT score of 14.   Mood Disorder Assessment: Performed by Jackie Oliveira MD on 12/3/2020.     reviewed with every prescription, last reviewed by Jackie Oliveira MD on 07/21/2021     Prognosis >1 year  Risk: High (ORT>7)  Indication for Opioid Use: Moderate or Strong  Baseline and Ongoing UDT last performed on:12/3/2020  Naloxone Script provided on: 12/3/2020.   Pill  Counts to be performed, plan & last pill count: liquid, did well w 2 week refills, no issues on monthly refills since 2/2021  Indications for Tapering reviewed: Not appropriate today  Counseling Support: reviewed 2/25/21, has seen Palliative LICSW and no ongoing needs    Key Data Reviewed:  Last Chemistry 3/2/21 via Care Everywhere  Cre 1.12, GFR >60  CO2   29    Impression & Recommendations & Counseling:  Wilbert Hutchins 61 year old male seen today for follow-up, visit focused on dyspnea, disease understanding, and prognosis today. Having slowly progressive dyspnea and functional decline.  Appetite down some, weight stable.      Dyspnea, on chronic opioids, high risk due to history  - Has had improvement in symptoms with opioids, has managed well without concerns on scheduled TID morphine dosing.  Having worsened dyspnea, has needed to go up on oxygen and getting more DME for home  - Discussed changing to 15 mg MS Contin BID - will be ~25% increase.  Stop morphine liquid when he starts the MS Contin     Interstitial lung disease  Has had functional decline over the last year, now mostly sitting through the day and with dyspnea while performing ADLs.  Given his significant dyspnea on mild exertion, with the fact that he is not interested in stopping smoking marijuana or cigarettes, he knows he would not be a transplant candidate.  -  Feels overall, would want treatment focused on comfort, knows his lung disease will slowly progress, and has found discussion of what to expect in the future helpful.  Has had slow decline and has been increasingly symptomatic.  Discussed today that I think he is likely having progression, and it may be reasonable to involve hospice at some point in the next year if he continues to have more difficulty with ADLs, more sedentary time and dyspnea at rest.  Briefly introduced hospice. Doesn't feel he needs this today, and I think prognosis likely >1 year at his point.   - Has HCD at  [facility], DNR    (This note was transcribed using voice recognition software. While I review and edit the transcription, I may miss errors, and the software sometimes does unexpected capitalizations and formatting that I miss. Please let me know of any serious mistranscriptions and I will addend this note.)    RTC in 6 weeks    Jackie Oliveira MD  Palliative Medicine  Pager [phone]       Again, thank you for allowing me to participate in the care of your patient.      Sincerely,    Jackie Oliveira MD

## 2021-07-26 ENCOUNTER — MYC MEDICAL ADVICE (OUTPATIENT)
Dept: PALLIATIVE CARE | Facility: CLINIC | Age: 61
End: 2021-07-26

## 2021-07-26 RX ORDER — MORPHINE SULFATE 15 MG/1
15 TABLET, FILM COATED, EXTENDED RELEASE ORAL EVERY 12 HOURS
Qty: 60 TABLET | Refills: 0 | Status: SHIPPED | OUTPATIENT
Start: 2021-07-26 | End: 2021-08-26

## 2021-07-29 ENCOUNTER — NURSE TRIAGE (OUTPATIENT)
Dept: ONCOLOGY | Facility: CLINIC | Age: 61
End: 2021-07-29

## 2021-07-29 NOTE — TELEPHONE ENCOUNTER
"[facility] Triage    Patient's pharmacy called. They need his MS Contin prescription to have the diagnosis of \"acute\" or \"chronic\" pain on the prescription.     They are also wanting confirmation that he will no longer be on liquid morphine before dispensing the tablets.    Encounter routed to Dr. Oliveira for clarification and diagnosis request.    "

## 2021-08-25 ENCOUNTER — DOCUMENTATION ONLY (OUTPATIENT)
Dept: OTHER | Facility: CLINIC | Age: 61
End: 2021-08-25

## 2021-08-26 ENCOUNTER — PATIENT OUTREACH (OUTPATIENT)
Dept: PULMONOLOGY | Facility: CLINIC | Age: 61
End: 2021-08-26

## 2021-08-26 ENCOUNTER — PATIENT OUTREACH (OUTPATIENT)
Dept: PALLIATIVE CARE | Facility: CLINIC | Age: 61
End: 2021-08-26

## 2021-08-26 DIAGNOSIS — R06.02 SHORTNESS OF BREATH: ICD-10-CM

## 2021-08-26 DIAGNOSIS — J84.9 ILD (INTERSTITIAL LUNG DISEASE) (H): ICD-10-CM

## 2021-08-26 RX ORDER — MORPHINE SULFATE 15 MG/1
15 TABLET, FILM COATED, EXTENDED RELEASE ORAL EVERY 12 HOURS
Qty: 60 TABLET | Refills: 0 | Status: SHIPPED | OUTPATIENT
Start: 2021-08-26 | End: 2021-09-28

## 2021-08-26 NOTE — PROGRESS NOTES
Received fax from pharmacy  requesting refill of ms contin.     Last refill: 7/26/2021  Last office visit: 7/21/2021  Scheduled for follow up 9/1/2021     Will route request to MD for review.     Reviewed [place]  Report.

## 2021-08-26 NOTE — CONFIDENTIAL NOTE
Patient contacted regarding feeling worse over the past few months. Increased SOB and cough over the past month, not coughing much up a little white thick phlegm from time to time. Having lower oxygen levels at rest and with activity, low to mid 80's, using 4 LPM. Patient wondering if he can get a CT scan done to see if things are progressing.     Message sent to Dr Lin.     Instructed patient okay to turn up oxygen as need to 6 + LPM to help keep him above 90% and to try over the counter cough suppressant and lozenges to help control cough. Patient agree with plan.

## 2021-08-30 NOTE — PROGRESS NOTES
"Patient location [place]  What phone number would you like to be contacted at? [phone]  How would you like to obtain your AVS? [facility]    Palliative Care Outpatient Clinic Progress Note    Patient Name:  Wilbert Hutchins  Primary Provider:  Michelle Tipton    Chief Complaint/Patient ID:  Wilbert Hutchins is a 62yo M with a history of ILD on 3L O2   - started on chronic opioids for dyspnea 12/2020 for dyspnea affecting ADLs, mainly using scooter for mobility   +RF CTD  Limited mobility due to foot injury and dyspnea    Last Palliative Care Appointment:   7/21/21 - increased MS IR 8 mg TID -> MS SR 15 mg BID and stopped MS IR.      Interim History:  Wilbert Hutchins 61 year old male returns to be seen by palliative care today.  Visit performed by telephone due to coronavirus pandemic.      Today says he is \"so-so\" - energy and functional ability has declined a lot.  Is conversationally dyspneic after getting off scooter and walking 4 feet.  Says in the last few months, dyspnea with any movement, going from inside->outside worsens dry cough.  Has felt worse after PFTs, deep breathing triggers cough.   Will be 90-91 at rest on RA, 71 with any walking, 80s when wearing 6L.  Has had to increase O2 4L -> 6L.    Wearing in the shower, able to dress and shower with oxygen on.  With worsening dyspnea with minimal movement, called pulmonary and has a CT scan scheduled today.      Feels the increase in morphine is helping - had 1 day without because of delay at his pharmacy, and noticed a significant difference in his breathing.  Not having as much end-dose failure as with short-acting.  Not having any constipation.     Social History:  Pertinent changes to social history/social situation since last visit: None  Lives with SO Eve, together 18 years  Patient has 5 children ranging from mid 20s to 30s   Previous  service  Previous illicit drug use, severe alcohol use disorder. Daily use of nonmedical " cannabis, has been using since age 18, cigarettes.  Denies recent use of any other recreational or illicit drugs, no alcohol use since since 2008  M[employer]on  Advance Directive Status:  Has completed but not in our system, would want partner Myra to make medical decisions  Social History     Tobacco Use     Smoking status: Current Some Day Smoker     Packs/day: 0.75     Types: Cigarettes     Smokeless tobacco: Never Used     Tobacco comment: cutting way down   Substance Use Topics     Alcohol use: No     Drug use: Yes     Allergies   Allergen Reactions     Trace Metals Dermatitis, Muscle Pain (Myalgia), Rash and Swelling        Current Outpatient Medications   Medication Sig Dispense Refill     albuterol (PROAIR HFA, PROVENTIL HFA, VENTOLIN HFA) 108 (90 BASE) MCG/ACT inhaler Inhale 2 puffs into the lungs every 6 hours as needed for shortness of breath / dyspnea 1 Inhaler 5     cetirizine (ZYRTEC) 10 MG tablet Take 10 mg by mouth       cholecalciferol 1000 units TABS Take 1,000 Units by mouth daily       fluticasone (FLONASE) 50 MCG/ACT nasal spray Spray 1 spray in nostril       gabapentin (NEURONTIN) 300 MG capsule Take 600 mg by mouth       hydroxychloroquine (PLAQUENIL) 200 MG tablet Take 200 mg by mouth       ibuprofen (IBU) 800 MG tablet TAKE ONE TABLET BY MOUTH FOUR TIMES DAILY AS NEEDED FOR PAIN       morphine (MS CONTIN) 15 MG CR tablet Take 1 tablet (15 mg) by mouth every 12 hours 60 tablet 0     naloxone (NARCAN) 4 MG/0.1ML nasal spray Spray 1 spray (4 mg) into one nostril alternating nostrils as needed for opioid reversal every 2-3 minutes until assistance arrives (Patient not taking: Reported on 4/15/2021) 0.2 mL 0     order for DME Upadated oxygen: Patient requires supplemental Oxygen 2 LPM via nasal canula with activity and nocturnally. Oxygen will be for a lifetime. 1 Device 0     predniSONE (DELTASONE) 10 MG tablet Take 13 mg by mouth       ranitidine (ZANTAC) 150 MG tablet   3     sertraline (ZOLOFT)  100 MG tablet Take 200 mg by mouth       simvastatin (ZOCOR) 40 MG tablet   3       Review of Systems:  Palliative Symptom Review (0=no symptom/no concern, 1=mild, 2=moderate, 3=severe):      Pain: 1      Fatigue: 3      Depressive Symptoms: 1      Anxiety: 0      Drowsiness: 0      Poor Appetite: ND      Shortness of Breath: 3      Other: 0      Overall (0 good/no concerns, 3 very poor):  1    No physical exam due to phone visit.  Labored breathing, speaking 3-4 words at a time after walking 4 steps.  Able to speak in full sentences with rest, mildly labored breathing.     Wt Readings from Last 5 Encounters:   05/26/21 96.6 kg (213 lb)   12/03/20 100 kg (220 lb 6.4 oz)   11/19/20 99.3 kg (219 lb)   12/18/19 100.2 kg (221 lb)   09/25/19 100.2 kg (221 lb)       [facility] Outpatient Palliative Care Opioid Prescribing Safety Plan    Opioid Safety Education: Reviewed by Jackie Oliveira MD on 12/3/2020  Opioid Risk Assessment: Performed by Jackie Oliveira MD on 12/3/2020.  ORT score of 14.   Mood Disorder Assessment: Performed by Jackie Oliveira MD on 12/3/2020.     reviewed with every prescription, last reviewed by Jackie Oliveira MD on 09/01/2021     Prognosis >1 year  Risk: High (ORT>7)  Indication for Opioid Use: Moderate or Strong  Baseline and Ongoing UDT last performed on:12/3/2020  Naloxone Script provided on: 12/3/2020.   Pill Counts to be performed, plan & last pill count: did well w 2 week refills, no issues on monthly refills since 2/2021  Indications for Tapering reviewed: Not appropriate today  Counseling Support: reviewed 2/25/21, has seen Palliative LICSW and no ongoing needs    Key Data Reviewed:  Last Chemistry 3/2/21 via Care Everywhere  Cre 1.12, GFR >60  CO2   29    Impression & Recommendations & Counseling:  Wilbert Hutchins 61 year old male seen today for follow-up, visit focused on dyspnea, disease understanding. Having slowly progressive dyspnea and functional decline, now with  significant hypoxia and dyspnea with minimal movement.  Has CT scan today to look for potentially reversible contributors.     Dyspnea, on chronic opioids, high risk due to history  - Has had improvement in symptoms with opioids, has managed well without concerns on scheduled TID morphine dosing.  Having worsened dyspnea, has needed to go up on oxygen and increasing morphine need at end of dose, so changed to MS Contin 7/2021 with improvement  - Will continue MS Contin 15 mg BID  - Counseled on refill process - call us directly rather than his pharmacy when he needs a refill, 7 days before runs out to prevent delays     Interstitial lung disease  Has had functional decline over the last year, now mostly sitting through the day and with dyspnea while even a few steps.  Given his significant dyspnea on mild exertion, with the fact that he is not interested in stopping smoking marijuana or cigarettes, he knows he would not be a transplant candidate.  -  Feels overall, would want treatment focused on comfort, knows his lung disease will slowly progress, and has found discussion of what to expect in the future helpful.  Has had slow decline and has been increasingly symptomatic.  He have discussed hospice in the past, and he did not feel ready.   CT Scan today to see if there is clear progression, or potentially contributors that could be treated.  If none found or no improvement, would recommend hospice evaluation and told him that today.  He'd like to see what the CT shows, which I agree with and go from there  - [place] [facility]    (This note was transcribed using voice recognition software. While I review and edit the transcription, I may miss errors, and the software sometimes does unexpected capitalizations and formatting that I miss. Please let me know of any serious mistranscriptions and I will addend this note.)    RTC in 2 months w my colleague, will be in person and he's aware    Telephone visit duration:   22 minutes    Jackie Oliveira MD  Palliative Medicine  Pager [phone]

## 2021-08-31 ENCOUNTER — TELEPHONE (OUTPATIENT)
Dept: PULMONOLOGY | Facility: CLINIC | Age: 61
End: 2021-08-31

## 2021-08-31 DIAGNOSIS — R06.02 SHORTNESS OF BREATH: ICD-10-CM

## 2021-08-31 DIAGNOSIS — J84.9 ILD (INTERSTITIAL LUNG DISEASE) (H): Primary | ICD-10-CM

## 2021-08-31 NOTE — TELEPHONE ENCOUNTER
Spoke with pt after receiving communication to help arrange a CT tomorrow after 10AM. Appt scheduled for 5PM and details confirmed with pt.

## 2021-08-31 NOTE — TELEPHONE ENCOUNTER
----- Message from Alana Saenz RN sent at 8/31/2021 11:43 AM CDT -----  Regarding: RE: increased symptoms  Valery-  I spoke with Garry a moment ago; his symptoms have not changed since he spoke with Marah.   His sats at rest are 94-95% on 6LPM but drops to mid-80s within ~30' of walking.  He is on 10 mg prednisone daily; states he has been at this dose for a couple of years.   I will order the CT scan now; he can come tomorrow morning around 10AM and I informed him you would review and we would be in touch after.     Clinic Coordinators-  Please call Garry and make [place] [facility] [place] at or after 10AM; he will need to arrange an appointment for a , the sooner you can connect the better.     Thank you,    Alana Saenz RN, BSN  ILD Nurse Care Coordinator  (P) [phone]    ----- Message -----  From: Valery Lin MD  Sent: 8/30/2021  11:03 AM CDT  To: Leonor Simpson RN, #  Subject: RE: increased symptoms                           Sorry for the delay.  I agree we should get a CT. What dose of Prednisone is he on?      Valery  ----- Message -----  From: Leonor Simpson RN  Sent: 8/26/2021   3:06 PM CDT  To: Valery Lin MD, #  Subject: increased symptoms                               Hi Valery,    Patient contacted regarding feeling worse over the past few months. Increased SOB and cough over the past month, not coughing much up a little white thick phlegm from time to time. Having lower oxygen levels at rest and with activity, low to mid 80's, using 4 LPM. Patient wondering if he can get a CT scan done to see if things are progressing.     I instructed patient okay to turn up oxygen as need to 6 + LPM to help keep him above 90% and to try over the counter cough suppressant and lozenges to help control cough.    He does not see you next until Jan. Do you want to get CT scan on him? Or any other recommendation?    Marah Simpson RN  ILD Care Coordinator  [phone]

## 2021-09-01 ENCOUNTER — ANCILLARY PROCEDURE (OUTPATIENT)
Dept: CT IMAGING | Facility: CLINIC | Age: 61
End: 2021-09-01
Attending: INTERNAL MEDICINE
Payer: COMMERCIAL

## 2021-09-01 ENCOUNTER — VIRTUAL VISIT (OUTPATIENT)
Dept: PALLIATIVE CARE | Facility: CLINIC | Age: 61
End: 2021-09-01
Attending: INTERNAL MEDICINE
Payer: COMMERCIAL

## 2021-09-01 DIAGNOSIS — R06.02 SHORTNESS OF BREATH: ICD-10-CM

## 2021-09-01 DIAGNOSIS — R06.02 SHORTNESS OF BREATH: Primary | ICD-10-CM

## 2021-09-01 DIAGNOSIS — J84.9 ILD (INTERSTITIAL LUNG DISEASE) (H): ICD-10-CM

## 2021-09-01 DIAGNOSIS — Z51.5 ENCOUNTER FOR PALLIATIVE CARE: ICD-10-CM

## 2021-09-01 PROCEDURE — 71250 CT THORAX DX C-: CPT | Mod: GC | Performed by: RADIOLOGY

## 2021-09-01 PROCEDURE — 99213 OFFICE O/P EST LOW 20 MIN: CPT | Mod: TEL | Performed by: INTERNAL MEDICINE

## 2021-09-01 PROCEDURE — 999N001193 HC VIDEO/TELEPHONE VISIT; NO CHARGE

## 2021-09-01 NOTE — LETTER
"9/1/2021       RE: Wilbert Hutchins  [address]  [place] [address]     Dear Colleague,    Thank you for referring your patient, Wilbert Hutchins, to the [facility] at [facility]. Please see a copy of my visit note below.    Palliative Care Outpatient Clinic Progress Note    Patient Name:  Wilbert Hutchins  Primary Provider:  Michelle Tipton    Chief Complaint/Patient ID:  Wilbert Hutchins is a 60yo M with a history of ILD on 3L O2   - started on chronic opioids for dyspnea 12/2020 for dyspnea affecting ADLs, mainly using scooter for mobility   +RF CTD  Limited mobility due to foot injury and dyspnea    Last Palliative Care Appointment:   7/21/21 - increased MS IR 8 mg TID -> MS SR 15 mg BID and stopped MS IR.      Interim History:  Wilbert Hutchins 61 year old male returns to be seen by palliative care today.  Visit performed by telephone due to coronavirus pandemic.      Today says he is \"so-so\" - energy and functional ability has declined a lot.  Is conversationally dyspneic after getting off scooter and walking 4 feet.  Says in the last few months, dyspnea with any movement, going from inside->outside worsens dry cough.  Has felt worse after PFTs, deep breathing triggers cough.   Will be 90-91 at rest on RA, 71 with any walking, 80s when wearing 6L.  Has had to increase O2 4L -> 6L.    Wearing in the shower, able to dress and shower with oxygen on.  With worsening dyspnea with minimal movement, called pulmonary and has a CT scan scheduled today.      Feels the increase in morphine is helping - had 1 day without because of delay at his pharmacy, and noticed a significant difference in his breathing.  Not having as much end-dose failure as with short-acting.  Not having any constipation.     Social History:  Pertinent changes to social history/social situation since last visit: None  Lives with SO " Eve, betzy 18 years  Patient has 5 children ranging from mid 20s to 30s   Previous  service  Previous illicit drug use, severe alcohol use disorder. Daily use of nonmedical cannabis, has been using since age 18, cigarettes.  Denies recent use of any other recreational or illicit drugs, no alcohol use since since 2008  Rast[employer]  Advance Directive Status:  Has completed but not in our system, would want partner Myra to make medical decisions  Social History     Tobacco Use     Smoking status: Current Some Day Smoker     Packs/day: 0.75     Types: Cigarettes     Smokeless tobacco: Never Used     Tobacco comment: cutting way down   Substance Use Topics     Alcohol use: No     Drug use: Yes     Allergies   Allergen Reactions     Trace Metals Dermatitis, Muscle Pain (Myalgia), Rash and Swelling        Current Outpatient Medications   Medication Sig Dispense Refill     albuterol (PROAIR HFA, PROVENTIL HFA, VENTOLIN HFA) 108 (90 BASE) MCG/ACT inhaler Inhale 2 puffs into the lungs every 6 hours as needed for shortness of breath / dyspnea 1 Inhaler 5     cetirizine (ZYRTEC) 10 MG tablet Take 10 mg by mouth       cholecalciferol 1000 units TABS Take 1,000 Units by mouth daily       fluticasone (FLONASE) 50 MCG/ACT nasal spray Spray 1 spray in nostril       gabapentin (NEURONTIN) 300 MG capsule Take 600 mg by mouth       hydroxychloroquine (PLAQUENIL) 200 MG tablet Take 200 mg by mouth       ibuprofen (IBU) 800 MG tablet TAKE ONE TABLET BY MOUTH FOUR TIMES DAILY AS NEEDED FOR PAIN       morphine (MS CONTIN) 15 MG CR tablet Take 1 tablet (15 mg) by mouth every 12 hours 60 tablet 0     naloxone (NARCAN) 4 MG/0.1ML nasal spray Spray 1 spray (4 mg) into one nostril alternating nostrils as needed for opioid reversal every 2-3 minutes until assistance arrives (Patient not taking: Reported on 4/15/2021) 0.2 mL 0     order for DME Upadated oxygen: Patient requires supplemental Oxygen 2 LPM via nasal canula with activity  and nocturnally. Oxygen will be for a lifetime. 1 Device 0     predniSONE (DELTASONE) 10 MG tablet Take 13 mg by mouth       ranitidine (ZANTAC) 150 MG tablet   3     sertraline (ZOLOFT) 100 MG tablet Take 200 mg by mouth       simvastatin (ZOCOR) 40 MG tablet   3       Review of Systems:  Palliative Symptom Review (0=no symptom/no concern, 1=mild, 2=moderate, 3=severe):      Pain: 1      Fatigue: 3      Depressive Symptoms: 1      Anxiety: 0      Drowsiness: 0      Poor Appetite: ND      Shortness of Breath: 3      Other: 0      Overall (0 good/no concerns, 3 very poor):  1    No physical exam due to phone visit.  Labored breathing, speaking 3-4 words at a time after walking 4 steps.  Able to speak in full sentences with rest, mildly labored breathing.     Wt Readings from Last 5 Encounters:   05/26/21 96.6 kg (213 lb)   12/03/20 100 kg (220 lb 6.4 oz)   11/19/20 99.3 kg (219 lb)   12/18/19 100.2 kg (221 lb)   09/25/19 100.2 kg (221 lb)       [facility] Outpatient Palliative Care Opioid Prescribing Safety Plan    Opioid Safety Education: Reviewed by Jackie Oliveira MD on 12/3/2020  Opioid Risk Assessment: Performed by Jackie Oliveira MD on 12/3/2020.  ORT score of 14.   Mood Disorder Assessment: Performed by Jackie Oliveira MD on 12/3/2020.     reviewed with every prescription, last reviewed by Jackie Oliveira MD on 09/01/2021     Prognosis >1 year  Risk: High (ORT>7)  Indication for Opioid Use: Moderate or Strong  Baseline and Ongoing UDT last performed on:12/3/2020  Naloxone Script provided on: 12/3/2020.   Pill Counts to be performed, plan & last pill count: did well w 2 week refills, no issues on monthly refills since 2/2021  Indications for Tapering reviewed: Not appropriate today  Counseling Support: reviewed 2/25/21, has seen Palliative LICSW and no ongoing needs    Key Data Reviewed:  Last Chemistry 3/2/21 via Care Everywhere  Cre 1.12, GFR >60  CO2   29    Impression & Recommendations &  Counseling:  Wilbert SHELLEY Cuong 61 year old male seen today for follow-up, visit focused on dyspnea, disease understanding. Having slowly progressive dyspnea and functional decline, now with significant hypoxia and dyspnea with minimal movement.  Has CT scan today to look for potentially reversible contributors.     Dyspnea, on chronic opioids, high risk due to history  - Has had improvement in symptoms with opioids, has managed well without concerns on scheduled TID morphine dosing.  Having worsened dyspnea, has needed to go up on oxygen and increasing morphine need at end of dose, so changed to MS Contin 7/2021 with improvement  - Will continue MS Contin 15 mg BID  - Counseled on refill process - call us directly rather than his pharmacy when he needs a refill, 7 days before runs out to prevent delays     Interstitial lung disease  Has had functional decline over the last year, now mostly sitting through the day and with dyspnea while even a few steps.  Given his significant dyspnea on mild exertion, with the fact that he is not interested in stopping smoking marijuana or cigarettes, he knows he would not be a transplant candidate.  -  Feels overall, would want treatment focused on comfort, knows his lung disease will slowly progress, and has found discussion of what to expect in the future helpful.  Has had slow decline and has been increasingly symptomatic.  He have discussed hospice in the past, and he did not feel ready.   CT Scan today to see if there is clear progression, or potentially contributors that could be treated.  If none found or no improvement, would recommend hospice evaluation and told him that today.  He'd like to see what the CT shows, which I agree with and go from there  - Has [place] at [facility], [place]    (This note was transcribed using voice recognition software. While I review and edit the transcription, I may miss errors, and the software sometimes does unexpected capitalizations and  formatting that I miss. Please let me know of any serious mistranscriptions and I will addend this note.)    RTC in 2 months w my colleague, will be in person and he's aware    Telephone visit duration:  22 minutes    Jackie Oliveira MD  Palliative Medicine  Pager [phone]                 Again, thank you for allowing me to participate in the care of your patient.      Sincerely,    Jackie Oliveira MD

## 2021-09-01 NOTE — PATIENT INSTRUCTIONS
Thank you for seeing the Palliative Care Clinic today.     Return to clinic in 2 months for a follow-up.      You can reach the Palliative Care Team during business hours at the following numbers:   -For the [facility], call [phone]  -To reach the palliative RN for questions or refills, call [phone]       To reach the Palliative Care Provider on-call After-hours or on holidays and weekends, call: [phone].  Please note that we are not able to provide pain medication refills on evenings or weekends.

## 2021-09-02 ENCOUNTER — PATIENT OUTREACH (OUTPATIENT)
Dept: CARE COORDINATION | Facility: CLINIC | Age: 61
End: 2021-09-02

## 2021-09-02 ENCOUNTER — PATIENT OUTREACH (OUTPATIENT)
Dept: PULMONOLOGY | Facility: CLINIC | Age: 61
End: 2021-09-02

## 2021-09-02 NOTE — CONFIDENTIAL NOTE
Dr Lin got CT scan, shows no acute exacerbation and maybe slight worsening fibrosis/stable.  Informed patient. Asking if smoking is making him worse. Informed him it is not helping and likely helping to make things progress faster. He stated he is trying to cut back but not sure if he can quit or not ready.

## 2021-09-02 NOTE — PROGRESS NOTES
"Oncology Distress Screening Follow-up  Clinical Social Work  [facility]    Identified Concern and Score From Distress Screenin. How concerned are you about your ability to eat?   0           2. How concerned are you about unintended weight loss or your current weight?   0           3. How concerned are you about feeling depressed or very sad?   3           4. How concerned are you about feeling anxious or very scared?   2           5. Do you struggle with the loss of meaning and romain in your life?   Somewhat           6. How concerned are you about work and home life issues that may be affected by your cancer?   7Abnormal            7. How concerned are you about knowing what resources are available to help you?   0           8. Do you currently have what you would describe as Advent or spiritual struggles?              Not at all             Date of Distress Screenin21    Intervention:   Garry is a 61-year-old gentleman who comes to see palliative care provider Dr. Oliveira for support with dyspnea and interstitial lung disease. This clinician called Garry today with goal of following up on elevated distress screen.     Garry endorsed that he has been feeling \"frutrated\" by his lack of mobility and being able to contribute to home in the way that he desires. Garry reports that he \"is not depressed\" about where he is in his disease process and knowing that time is more limited, but is just frustrated at times that he cannot do more. Garry reports that he just bought a farida/lab mix yesterday, which has been helpful for his coping and keeping himself occupied. Garry reports that he uses a scooter predominantly to get outdoors, where he enjoys spending time, and denies benefit of additional assistant device in home.     SW oriented to psychosocial services and support, providing emotional support surrounding understandable frustration of changes in mobility. Garry denies resource need at present, reporting " that he has many close relationships in his life who help with practical and emotional support. Garry appreciative of outreach, and reported that he would connect if needed. Garry reports that he is also followed closely by  through his [employer] insurance.      Assessment:  Garry appears to have a robust network of friends/family locally and out of state whom he can have omar discussions with about his mortality. Garry appears to be adjusting from emotional standpoint as well as anyone could expect, and is coping with understandable frustrations at times. Garry is open to considering hospice, but is anxious to see what results will show at end of month. Aware of SW support available in future as needed.     Follow-up Required:   SW will remain available as needed for ongoing psychosocial support.     BILL Arenas, LICSW  Phone: [phone]  [facility]: M, Thu  *every other Tue, 8am-4:30pm  [facility]: W, F, *every other Tue, 8am-4:30pm

## 2021-09-09 ENCOUNTER — TELEPHONE (OUTPATIENT)
Dept: PULMONOLOGY | Facility: CLINIC | Age: 61
End: 2021-09-09

## 2021-09-09 DIAGNOSIS — R05.9 COUGH: Primary | ICD-10-CM

## 2021-09-09 DIAGNOSIS — J84.9 ILD (INTERSTITIAL LUNG DISEASE) (H): ICD-10-CM

## 2021-09-09 RX ORDER — BENZONATATE 200 MG/1
200 CAPSULE ORAL 3 TIMES DAILY PRN
Qty: 90 CAPSULE | Refills: 1 | Status: SHIPPED | OUTPATIENT
Start: 2021-09-09 | End: 2023-03-07

## 2021-09-09 NOTE — CONFIDENTIAL NOTE
Dr Lin reviewed CT scan it stable. Informed patient. He was concerned his still smoking is making his disease worse. Discussed that it is not helping and can contribute to worsening in his disease. He is not ready to quit but is trying to cut back.

## 2021-09-09 NOTE — TELEPHONE ENCOUNTER
Talked to Garry the phone today. He has been struggling with increased fatigue and cough.  He reports his cough exacerbates his back pain. CT shows disease progression, no clear signs of exacerbation.     1) Will order Tessalon perles to local pharmacy  2) Will increase Prednisone to 40mg x 3d, 20mg x 3d, 15mg x 3d, then back to 10mg.    Valery Lin MD

## 2021-09-14 ENCOUNTER — TELEPHONE (OUTPATIENT)
Dept: PULMONOLOGY | Facility: CLINIC | Age: 61
End: 2021-09-14

## 2021-09-14 DIAGNOSIS — J84.9 ILD (INTERSTITIAL LUNG DISEASE) (H): Primary | ICD-10-CM

## 2021-09-14 RX ORDER — PREDNISONE 10 MG/1
10 TABLET ORAL DAILY
Qty: 90 TABLET | Refills: 1 | Status: SHIPPED | OUTPATIENT
Start: 2021-09-14

## 2021-09-14 NOTE — TELEPHONE ENCOUNTER
"Spoke with patient.  Notes he had taken 40 mg daily x2 days, 35 mg daily x2 days, and today only took 10 mg due to running low on maintenance prescription.     Notes his cough feels better, that it is being \"broken up\" by the tessalon perles.     Will send refill of maintenance dose of prednisone so patient can continue with pred taper: 20 mg for 2 more days, then 15 mg daily for 3 days, and back to 10 mg daily thereafter.     Patient will call if any concerns arise with symptoms or if med refill needed.     Alana Saenz, RN, BSN  ILD Nurse Care Coordinator  (P) [phone]    "

## 2021-09-14 NOTE — TELEPHONE ENCOUNTER
----- Message from Valery Lin MD sent at 9/9/2021  5:38 PM CDT -----  Called geraldo, we are doing a short pred taper to see if it helps.  Can you reach out to him tomorrow?

## 2021-09-28 ENCOUNTER — PATIENT OUTREACH (OUTPATIENT)
Dept: PALLIATIVE CARE | Facility: CLINIC | Age: 61
End: 2021-09-28

## 2021-09-28 DIAGNOSIS — J84.9 ILD (INTERSTITIAL LUNG DISEASE) (H): ICD-10-CM

## 2021-09-28 DIAGNOSIS — R06.02 SHORTNESS OF BREATH: ICD-10-CM

## 2021-09-28 RX ORDER — MORPHINE SULFATE 15 MG/1
15 TABLET, FILM COATED, EXTENDED RELEASE ORAL EVERY 12 HOURS
Qty: 60 TABLET | Refills: 0 | Status: SHIPPED | OUTPATIENT
Start: 2021-09-28 | End: 2021-10-20

## 2021-09-28 NOTE — PROGRESS NOTES
Received VM from patient requesting refill of morphine.     Last refill: 8/26/2021  Last office visit: 9/1/2021  Scheduled for follow up 11/5/2021    Will route request to MD for review.     Reviewed [place]  Report.

## 2021-10-20 ENCOUNTER — PATIENT OUTREACH (OUTPATIENT)
Dept: PALLIATIVE CARE | Facility: CLINIC | Age: 61
End: 2021-10-20

## 2021-10-20 DIAGNOSIS — R06.02 SHORTNESS OF BREATH: ICD-10-CM

## 2021-10-20 DIAGNOSIS — J84.9 ILD (INTERSTITIAL LUNG DISEASE) (H): ICD-10-CM

## 2021-10-20 RX ORDER — MORPHINE SULFATE 15 MG/1
15 TABLET, FILM COATED, EXTENDED RELEASE ORAL EVERY 12 HOURS
Qty: 60 TABLET | Refills: 0 | Status: SHIPPED | OUTPATIENT
Start: 2021-10-27 | End: 2021-11-22

## 2021-10-20 NOTE — PROGRESS NOTES
Received VM from patient requesting refill of morphine.     Last refill: 9/29/2021  Last office visit: 9/1/2021  Scheduled for follow up 11/5/2021    Will route request to MD for review.     Reviewed MN  Report.

## 2021-10-23 ENCOUNTER — HEALTH MAINTENANCE LETTER (OUTPATIENT)
Age: 61
End: 2021-10-23

## 2021-11-05 ENCOUNTER — OFFICE VISIT (OUTPATIENT)
Dept: PALLIATIVE CARE | Facility: CLINIC | Age: 61
End: 2021-11-05
Attending: INTERNAL MEDICINE
Payer: COMMERCIAL

## 2021-11-05 ENCOUNTER — DOCUMENTATION ONLY (OUTPATIENT)
Dept: OTHER | Facility: CLINIC | Age: 61
End: 2021-11-05

## 2021-11-05 VITALS
SYSTOLIC BLOOD PRESSURE: 160 MMHG | RESPIRATION RATE: 18 BRPM | DIASTOLIC BLOOD PRESSURE: 92 MMHG | BODY MASS INDEX: 32.64 KG/M2 | TEMPERATURE: 97.7 F | OXYGEN SATURATION: 96 % | HEART RATE: 53 BPM | WEIGHT: 228 LBS | HEIGHT: 70 IN

## 2021-11-05 DIAGNOSIS — R06.02 SHORTNESS OF BREATH: ICD-10-CM

## 2021-11-05 DIAGNOSIS — J84.9 ILD (INTERSTITIAL LUNG DISEASE) (H): Primary | ICD-10-CM

## 2021-11-05 DIAGNOSIS — Z71.89 ADVANCE CARE PLANNING: ICD-10-CM

## 2021-11-05 PROCEDURE — G0463 HOSPITAL OUTPT CLINIC VISIT: HCPCS

## 2021-11-05 PROCEDURE — 99417 PROLNG OP E/M EACH 15 MIN: CPT | Performed by: INTERNAL MEDICINE

## 2021-11-05 PROCEDURE — 99215 OFFICE O/P EST HI 40 MIN: CPT | Performed by: INTERNAL MEDICINE

## 2021-11-05 ASSESSMENT — PAIN SCALES - GENERAL: PAINLEVEL: MILD PAIN (3)

## 2021-11-05 ASSESSMENT — MIFFLIN-ST. JEOR: SCORE: 1855.45

## 2021-11-05 NOTE — LETTER
"11/5/2021       RE: Wilbert Hutchins  [address]  [facility] [address]     Dear Colleague,    Thank you for referring your patient, Wilbert Hutchins, to the [facility] at [facility]. Please see a copy of my visit note below.    Palliative Care Outpatient Clinic      Patient ID:  Medical - He has ILD on 4-6 lpm O2.    We have been following for dyspnea, Dr Oliveira started morphine-->morphineER 15mg bid Summer 2021.    Social - Lives with wife Eve. 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  Cannabis and tobacco use disorders: continues to smoke despite health consequences & ineligiblity for lung transplantation.     Care Planning - +HCD on file, names Myra as his agent. DNR/DNI POLST completed 11/2021      History:  History gathered today from: patient, medical chart, outside records including Care Everywhere    This is the first time I've met him    Dyspnea: morphine has been modestly helpful for him. 15mg bid morphineER. Not quite as helpful as a couple months ago and we discussed I typically don't use higher than 30mg a day for his problem, he's ok with that.  No side effects, no OIC, no sedation          PE: BP (!) 160/92   Pulse 53   Temp 97.7  F (36.5  C)   Resp 18   Ht 1.778 m (5' 10\")   Wt 103.4 kg (228 lb)   SpO2 96%   BMI 32.71 kg/m     Wt Readings from Last 3 Encounters:   11/05/21 103.4 kg (228 lb)   05/26/21 96.6 kg (213 lb)   12/03/20 100 kg (220 lb 6.4 oz)     Alert NAD  Lungs unlabored, dry crackles bilat base, intermittent dry cough  CV rrr s1s2  Obese  Clear sensorium full affect      Data reviewed:  I reviewed recent labs and imaging, my comments:   Outside labs, 8/2021 CO2 29, Cr normal    CT Sept, images reviewed, severe bilat fibrotic changes, worse peripherally & inferiorally     database reviewed: yRichard Small oxycodone script from ED " "10/21, dog bite      Impression & Recommendations:    62 yo with severe ILD, dyspnea    Dysnpea: no change, would not increase morphine    Long discussion with him about mood, cannabis and tobacco use disorders, code status, prognosis.  He smokes tobacco and cannabis daily and has decided to not try to give these up. His qol is most important to him and he thinks his qol will be terrible trying to stop. He knows this means he cant get transplanted and he reports feeling at peace with this, not worth it to him. I d/w him in a best case scenario some patients have ~10 years of good qol with a transplant, but of course not everyone, and he says \"they should give those lungs to someone who actually wants them.\" He spends a lot of time in his garage smoking and playing social poker games on his ipad, interacts with people from all over the world on those games and finds that meaningful. He is profoundly disabled from his ILD \"I can't unload a .\"      Prognosis: he expresses some surprise he's lived 4 y with this and hasn't deteriorated steadily. D/w him the overall progressive nature of his illness but worsening is stochastic and unpredictable. D/w him he's at high risk of dying really at any time, but if he was alive in 2 years too I would not be shocked.    We discussed code status, he wants a DNR/DNI order. I d/w him explicitly what that means. We complete a POLST. He does want active, restorative therapies, just not intubation/invasive ventilation, etc.     Follow-up 2 mo      76 minutes spent on the date of the encounter doing chart review, history and exam, patient education & counseling, documentation and other activities as noted above.    Thank you for involving us in the patient's care.   Jostin Peralta MD / Palliative Medicine / Text me via [facility].      "

## 2021-11-05 NOTE — NURSING NOTE
"Oncology Rooming Note    November 5, 2021 11:23 AM   Wilbert Hutchins is a 61 year old male who presents for:    Chief Complaint   Patient presents with     Oncology Clinic Visit     interstitial lung disease     Initial Vitals: BP (!) 160/92   Pulse 53   Temp 97.7  F (36.5  C)   Resp 18   Ht 1.778 m (5' 10\")   Wt 103.4 kg (228 lb)   SpO2 96%   BMI 32.71 kg/m   Estimated body mass index is 32.71 kg/m  as calculated from the following:    Height as of this encounter: 1.778 m (5' 10\").    Weight as of this encounter: 103.4 kg (228 lb). Body surface area is 2.26 meters squared.  Mild Pain (3) Comment: Data Unavailable   No LMP for male patient.  Allergies reviewed: Yes  Medications reviewed: Yes    Medications: Medication refills not needed today.  Pharmacy name entered into [employer] Messaging: [facility] [address] [place] [address]    Clinical concerns: No new concerns        Rony Sena MA            " Hospital course: 13 Patient arrived for IOL, Labor progressed with cytotec, pitocin, and epidural. . Patient had vaginal pack placed secondary to bleeding in the posterior vault. See delivery note for details. Patient tolerated delivery well.   2017 PPD#1 s/p . Doing well, meeting post partum milestones of tolerating PO and passing flatus. Is breastfeeding. Bryant still in place, will discontinue this Am. Vag pack pulled this Am.       Interval History:     She is doing well this morning. She is tolerating a regular diet without nausea or vomiting. She is not voiding spontaneously. She is not ambulating. She has passed flatus, and has not a BM. Vaginal bleeding is moderate. She denies fever or chills. Abdominal pain is mild and controlled with oral medications. She is breastfeeding. She desires circumcision for her male baby: no.    Objective:     Vital Signs (Most Recent):  Temp: 97.5 °F (36.4 °C) (17)  Pulse: 98 (17)  Resp: 18 (17)  BP: 103/61 (17)  SpO2: 97 % (17) Vital Signs (24h Range):  Temp:  [97 °F (36.1 °C)-98.3 °F (36.8 °C)] 97.5 °F (36.4 °C)  Pulse:  [] 98  Resp:  [16-19] 18  SpO2:  [87 %-100 %] 97 %  BP: (103-147)/(55-92) 103/61     Weight: 131.1 kg (289 lb)  Body mass index is 46.65 kg/m².      Intake/Output Summary (Last 24 hours) at 17 0623  Last data filed at 17 1900   Gross per 24 hour   Intake                0 ml   Output             1597 ml   Net            -1597 ml       Significant Labs:  Lab Results   Component Value Date    GROUPTRH O POS 2017    HEPBSAG Negative 2017    STREPBCULT No Group B Streptococcus isolated 08/10/2017       Recent Labs  Lab 17  0530   HGB 10.2*  10.2*   HCT 30.6*  30.6*       CBC:   Recent Labs  Lab 17  0530   WBC 14.43*  14.43*   RBC 3.54*  3.54*   HGB 10.2*  10.2*   HCT 30.6*  30.6*     221   MCV 86  86   MCH 28.8  28.8   MCHC 33.3  33.3      I have personallly reviewed all pertinent lab results from the last 24 hours.    Physical Exam:   Constitutional: She is oriented to person, place, and time. She appears well-developed and well-nourished. No distress.    HENT:   Head: Normocephalic and atraumatic.      Cardiovascular: Normal rate and regular rhythm.     Pulmonary/Chest: Effort normal.        Abdominal: Soft. She exhibits no distension. There is no tenderness.   Fundus firm below umbilicus     Genitourinary:   Genitourinary Comments: Vaginal pack removed, 80% saturated with blood. No active bleeding after palpating fundus  Bryant still in place             Musculoskeletal: Normal range of motion. She exhibits no edema.       Neurological: She is alert and oriented to person, place, and time.    Skin: Skin is warm and dry.    Psychiatric: She has a normal mood and affect.

## 2021-11-05 NOTE — PROGRESS NOTES
"Palliative Care Outpatient Clinic      Patient ID:  Medical - He has ILD on 4-6 lpm O2.    We have been following for dyspnea, Dr Oliveira started morphine-->morphineER 15mg bid Summer 2021.    Social - Lives with wife Eve. 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  Cannabis and tobacco use disorders: continues to smoke despite health consequences & ineligiblity for lung transplantation.     Care Planning - +HCD on file, names Myra as his agent. DNR/DNI POLST completed 11/2021      History:  History gathered today from: patient, medical chart, outside records including Care Everywhere    This is the first time I've met him    Dyspnea: morphine has been modestly helpful for him. 15mg bid morphineER. Not quite as helpful as a couple months ago and we discussed I typically don't use higher than 30mg a day for his problem, he's ok with that.  No side effects, no OIC, no sedation          PE: BP (!) 160/92   Pulse 53   Temp 97.7  F (36.5  C)   Resp 18   Ht 1.778 m (5' 10\")   Wt 103.4 kg (228 lb)   SpO2 96%   BMI 32.71 kg/m     Wt Readings from Last 3 Encounters:   11/05/21 103.4 kg (228 lb)   05/26/21 96.6 kg (213 lb)   12/03/20 100 kg (220 lb 6.4 oz)     Alert NAD  Lungs unlabored, dry crackles bilat base, intermittent dry cough  CV rrr s1s2  Obese  Clear sensorium full affect      Data reviewed:  I reviewed recent labs and imaging, my comments:   Outside labs, 8/2021 CO2 29, Cr normal    CT Sept, images reviewed, severe bilat fibrotic changes, worse peripherally & inferiorally     database reviewed: y. Small oxycodone script from ED 10/21, dog bite      Impression & Recommendations:    62 yo with severe ILD, dyspnea    Dysnpea: no change, would not increase morphine    Long discussion with him about mood, cannabis and tobacco use disorders, code status, prognosis.  He smokes tobacco and cannabis daily and has decided to not try to give these up. His qol is " "most important to him and he thinks his qol will be terrible trying to stop. He knows this means he cant get transplanted and he reports feeling at peace with this, not worth it to him. I d/w him in a best case scenario some patients have ~10 years of good qol with a transplant, but of course not everyone, and he says \"they should give those lungs to someone who actually wants them.\" He spends a lot of time in his garage smoking and playing social poker games on his ipad, interacts with people from all over the world on those games and finds that meaningful. He is profoundly disabled from his ILD \"I can't unload a .\"      Prognosis: he expresses some surprise he's lived 4 y with this and hasn't deteriorated steadily. D/w him the overall progressive nature of his illness but worsening is stochastic and unpredictable. D/w him he's at high risk of dying really at any time, but if he was alive in 2 years too I would not be shocked.    We discussed code status, he wants a DNR/DNI order. I d/w him explicitly what that means. We complete a POLST. He does want active, restorative therapies, just not intubation/invasive ventilation, etc.     Follow-up 2 mo      76 minutes spent on the date of the encounter doing chart review, history and exam, patient education & counseling, documentation and other activities as noted above.    Thank you for involving us in the patient's care.   Jostin Peralta MD / Palliative Medicine / Text me via [facility].    "

## 2021-11-22 ENCOUNTER — PATIENT OUTREACH (OUTPATIENT)
Dept: PALLIATIVE CARE | Facility: CLINIC | Age: 61
End: 2021-11-22
Payer: COMMERCIAL

## 2021-11-22 DIAGNOSIS — J84.9 ILD (INTERSTITIAL LUNG DISEASE) (H): ICD-10-CM

## 2021-11-22 DIAGNOSIS — R06.02 SHORTNESS OF BREATH: ICD-10-CM

## 2021-11-22 RX ORDER — MORPHINE SULFATE 15 MG/1
15 TABLET, FILM COATED, EXTENDED RELEASE ORAL EVERY 12 HOURS
Qty: 60 TABLET | Refills: 0 | Status: SHIPPED | OUTPATIENT
Start: 2021-11-26 | End: 2021-12-23

## 2021-11-22 NOTE — PROGRESS NOTES
Received VM from patient requesting refill of morphine.     Last refill: 10/27/2021  Last office visit: 11/5/2021  Scheduled for follow up 1/7/2021     Will route request to MD for review.     Reviewed [place]  Report.

## 2021-12-23 ENCOUNTER — PATIENT OUTREACH (OUTPATIENT)
Dept: PALLIATIVE CARE | Facility: CLINIC | Age: 61
End: 2021-12-23
Payer: COMMERCIAL

## 2021-12-23 DIAGNOSIS — J84.9 ILD (INTERSTITIAL LUNG DISEASE) (H): ICD-10-CM

## 2021-12-23 DIAGNOSIS — R06.02 SHORTNESS OF BREATH: ICD-10-CM

## 2021-12-23 RX ORDER — MORPHINE SULFATE 15 MG/1
15 TABLET, FILM COATED, EXTENDED RELEASE ORAL EVERY 12 HOURS
Qty: 60 TABLET | Refills: 0 | Status: SHIPPED | OUTPATIENT
Start: 2021-12-24 | End: 2022-01-17

## 2021-12-23 RX ORDER — MORPHINE SULFATE 15 MG/1
15 TABLET, FILM COATED, EXTENDED RELEASE ORAL EVERY 12 HOURS
Qty: 60 TABLET | Refills: 0 | OUTPATIENT
Start: 2021-12-23

## 2021-12-23 NOTE — PROGRESS NOTES
Received voicemail from patient requesting refill of morphine.     Last refill: 11/26/2021  Last office visit: 11/5/2021  Scheduled for follow up 1/7/2022    Will route request to MD for review.     Reviewed MN  Report.

## 2022-01-06 ENCOUNTER — OFFICE VISIT (OUTPATIENT)
Dept: PULMONOLOGY | Facility: CLINIC | Age: 62
End: 2022-01-06
Attending: INTERNAL MEDICINE
Payer: COMMERCIAL

## 2022-01-06 VITALS
OXYGEN SATURATION: 97 % | BODY MASS INDEX: 31.35 KG/M2 | DIASTOLIC BLOOD PRESSURE: 85 MMHG | WEIGHT: 219 LBS | HEART RATE: 66 BPM | HEIGHT: 70 IN | RESPIRATION RATE: 17 BRPM | SYSTOLIC BLOOD PRESSURE: 152 MMHG

## 2022-01-06 DIAGNOSIS — J84.9 ILD (INTERSTITIAL LUNG DISEASE) (H): Primary | ICD-10-CM

## 2022-01-06 DIAGNOSIS — Z72.0 CURRENT TOBACCO USE: ICD-10-CM

## 2022-01-06 DIAGNOSIS — J96.11 CHRONIC HYPOXEMIC RESPIRATORY FAILURE (H): ICD-10-CM

## 2022-01-06 PROCEDURE — 94729 DIFFUSING CAPACITY: CPT | Performed by: INTERNAL MEDICINE

## 2022-01-06 PROCEDURE — 0013A HC ADMIN COVID VAC MODERNA, 3RD DOSE IMM COMP PT: CPT | Performed by: INTERNAL MEDICINE

## 2022-01-06 PROCEDURE — 99214 OFFICE O/P EST MOD 30 MIN: CPT | Mod: 25 | Performed by: INTERNAL MEDICINE

## 2022-01-06 PROCEDURE — 94375 RESPIRATORY FLOW VOLUME LOOP: CPT | Performed by: INTERNAL MEDICINE

## 2022-01-06 PROCEDURE — 94726 PLETHYSMOGRAPHY LUNG VOLUMES: CPT | Performed by: INTERNAL MEDICINE

## 2022-01-06 PROCEDURE — 91301 HC RX IP 250 OP 636: CPT | Performed by: INTERNAL MEDICINE

## 2022-01-06 PROCEDURE — G0463 HOSPITAL OUTPT CLINIC VISIT: HCPCS | Mod: 25

## 2022-01-06 PROCEDURE — 250N000011 HC RX IP 250 OP 636: Performed by: INTERNAL MEDICINE

## 2022-01-06 RX ADMIN — CX-024414 0.25 ML: 0.2 INJECTION, SUSPENSION INTRAMUSCULAR at 14:00

## 2022-01-06 ASSESSMENT — MIFFLIN-ST. JEOR: SCORE: 1814.63

## 2022-01-06 ASSESSMENT — PAIN SCALES - GENERAL: PAINLEVEL: NO PAIN (0)

## 2022-01-06 NOTE — NURSING NOTE
Chief Complaint   Patient presents with     RECHECK     Return Interstitial Lung     Medications reviewed and vital signs taken.   Fany Matos, CMA
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
## 2022-01-07 ENCOUNTER — OFFICE VISIT (OUTPATIENT)
Dept: PALLIATIVE CARE | Facility: CLINIC | Age: 62
End: 2022-01-07
Attending: INTERNAL MEDICINE
Payer: COMMERCIAL

## 2022-01-07 VITALS — OXYGEN SATURATION: 98 % | DIASTOLIC BLOOD PRESSURE: 89 MMHG | HEART RATE: 61 BPM | SYSTOLIC BLOOD PRESSURE: 148 MMHG

## 2022-01-07 DIAGNOSIS — R06.02 SHORTNESS OF BREATH: ICD-10-CM

## 2022-01-07 DIAGNOSIS — Z71.89 ADVANCE CARE PLANNING: ICD-10-CM

## 2022-01-07 DIAGNOSIS — J84.9 ILD (INTERSTITIAL LUNG DISEASE) (H): Primary | ICD-10-CM

## 2022-01-07 LAB
6 MIN WALK (FT): 1200 FT
6 MIN WALK (M): 366 M

## 2022-01-07 PROCEDURE — 99215 OFFICE O/P EST HI 40 MIN: CPT | Performed by: INTERNAL MEDICINE

## 2022-01-07 PROCEDURE — 94618 PULMONARY STRESS TESTING: CPT | Performed by: INTERNAL MEDICINE

## 2022-01-07 PROCEDURE — G0463 HOSPITAL OUTPT CLINIC VISIT: HCPCS

## 2022-01-07 RX ORDER — FAMOTIDINE 20 MG/1
20 TABLET, FILM COATED ORAL 2 TIMES DAILY
COMMUNITY
Start: 2021-12-07

## 2022-01-07 ASSESSMENT — PAIN SCALES - GENERAL: PAINLEVEL: MILD PAIN (3)

## 2022-01-07 NOTE — LETTER
"1/7/2022       RE: Wilbert Hutchins  [address]  [facility] [address]     Dear Colleague,    Thank you for referring your patient, Wilbert Hutchins, to the [facility] at [facility]. Please see a copy of my visit note below.    Palliative Care Outpatient Clinic      Patient ID:  Medical - He has ILD on 4-6 lpm O2. Not interested in lung transplant; smokes tobacco and cannabis.      We have been following for dyspnea, Dr Oliveira started morphine-->morphineER 15mg bid Summer 2021.     Social - Lives with wife Eve. 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  Cannabis and tobacco use disorders: continues to smoke despite health consequences & ineligiblity for lung transplantation.      Care Planning - +HCD on file, names Myra as his agent. DNR/DNI POLST completed 11/2021      History:  History gathered today from: patient, medical chart    Saw Dr Lin yesterday; no major changes rec'd; encouraged smoking cessation.     He is getting a 6MWT today    Overall he is doing poorly. Some increased dyspnea with exertion. Showering even with a seat; microwaving a cup of coffee; the minimal exertion with those are enough to drop his sats while on 6lpm down to ~85%. Has tightness in his chest at rest sometimes. Does relaxation things to try to ease his resps. Dr Lin really encouraged smoking cessation yesterday and he is determined to continue \"it doesn't worsen things\" and I d/w him while I believe him that he does not acutely feel worse for smoking, and he finds that one of the key elements of his quality of life these days, the pleasure of smoking, that undoubtedly the smoking is accelerating the worsening of his lung function and dyspnea in a chronic way, if not acute.     Continues to use morphineER 15 mg bid; no side effects, mental changes, no constipation. Feels it helps " modestly    Feeling demoralized by his health. Feels well supported by Eve.     PE: There were no vitals taken for this visit.   Wt Readings from Last 3 Encounters:   01/06/22 99.3 kg (219 lb)   11/05/21 103.4 kg (228 lb)   05/26/21 96.6 kg (213 lb)     Alert NAD  Dyspnea with speaking  On electric wheelchair  Clear sensorium full affect      Data reviewed:  I reviewed recent labs and imaging, my comments:  PFTs RV 40%, TLC 56%, DLCO 39%     database reviewed: y      Impression & Recommendations:    60 yo with chronic hypoxemic respiratory failure from ILD, dyspnea    Continue morphine; he's using it safely with modest benefits.    Discussed natural history of his advanced lung failure, smoking, as above. He is worried about getting to a situation where he is essentially bedbound and lingers for a long time. D/w him while I don't for sure know his future of course, typically when people become bedbound from ILD they don't live like that for many months to years. Usually this happens in the final weeks to month of life, not much longer than that. He has a DNR/DNI order and POLST. D/w him hospice care at home; at some point we'll think he'll qualify but not at the moment.     41 minutes spent on the date of the encounter doing chart review, history and exam, patient education & counseling, documentation and other activities as noted above.      Thank you for involving us in the patient's care.   Jostin Peralta MD / Palliative Medicine / Text me via [facility].

## 2022-01-07 NOTE — NURSING NOTE
"Oncology Rooming Note    January 7, 2022 2:17 PM   Wilbert Hutchins is a 61 year old male who presents for:    Chief Complaint   Patient presents with     Oncology Clinic Visit     ILD     Initial Vitals: BP (!) 148/89   Pulse 61   SpO2 98%  Estimated body mass index is 31.42 kg/m  as calculated from the following:    Height as of 1/6/22: 1.778 m (5' 10\").    Weight as of 1/6/22: 99.3 kg (219 lb). There is no height or weight on file to calculate BSA.  Mild Pain (3) Comment: Data Unavailable   No LMP for male patient.  Allergies reviewed: Yes  Medications reviewed: Yes    Medications: Medication refills not needed today.  Pharmacy name entered into [employer]: [facility] [address] [place] [address]    Clinical concerns: none       Catrina Ruggiero CMA            "

## 2022-01-07 NOTE — PROGRESS NOTES
"Palliative Care Outpatient Clinic      Patient ID:  Medical - He has ILD on 4-6 lpm O2. Not interested in lung transplant; smokes tobacco and cannabis.      We have been following for dyspnea, Dr Oliveira started morphine-->morphineER 15mg bid Summer 2021.     Social - Lives with wife Eve. 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  Cannabis and tobacco use disorders: continues to smoke despite health consequences & ineligiblity for lung transplantation.      Care Planning - +HCD on file, names Myra as his agent. DNR/DNI POLST completed 11/2021      History:  History gathered today from: patient, medical chart    Saw Dr Lin yesterday; no major changes rec'd; encouraged smoking cessation.     He is getting a 6MWT today    Overall he is doing poorly. Some increased dyspnea with exertion. Showering even with a seat; microwaving a cup of coffee; the minimal exertion with those are enough to drop his sats while on 6lpm down to ~85%. Has tightness in his chest at rest sometimes. Does relaxation things to try to ease his resps. Dr Lin really encouraged smoking cessation yesterday and he is determined to continue \"it doesn't worsen things\" and I d/w him while I believe him that he does not acutely feel worse for smoking, and he finds that one of the key elements of his quality of life these days, the pleasure of smoking, that undoubtedly the smoking is accelerating the worsening of his lung function and dyspnea in a chronic way, if not acute.     Continues to use morphineER 15 mg bid; no side effects, mental changes, no constipation. Feels it helps modestly    Feeling demoralized by his health. Feels well supported by Eve.     PE: There were no vitals taken for this visit.   Wt Readings from Last 3 Encounters:   01/06/22 99.3 kg (219 lb)   11/05/21 103.4 kg (228 lb)   05/26/21 96.6 kg (213 lb)     Alert NAD  Dyspnea with speaking  On electric wheelchair  Clear " sensorium full affect      Data reviewed:  I reviewed recent labs and imaging, my comments:  PFTs RV 40%, TLC 56%, DLCO 39%     database reviewed: y      Impression & Recommendations:    60 yo with chronic hypoxemic respiratory failure from ILD, dyspnea    Continue morphine; he's using it safely with modest benefits.    Discussed natural history of his advanced lung failure, smoking, as above. He is worried about getting to a situation where he is essentially bedbound and lingers for a long time. D/w him while I don't for sure know his future of course, typically when people become bedbound from ILD they don't live like that for many months to years. Usually this happens in the final weeks to month of life, not much longer than that. He has a DNR/DNI order and POLST. D/w him hospice care at home; at some point we'll think he'll qualify but not at the moment.     41 minutes spent on the date of the encounter doing chart review, history and exam, patient education & counseling, documentation and other activities as noted above.    Thank you for involving us in the patient's care.   Jostin Peralta MD / Palliative Medicine / Text me via [facility].     no

## 2022-01-12 LAB
DLCOUNC-%PRED-PRE: 39 %
DLCOUNC-PRE: 10.88 ML/MIN/MMHG
DLCOUNC-PRED: 27.81 ML/MIN/MMHG
ERV-%PRED-PRE: 179 %
ERV-PRE: 1.79 L
ERV-PRED: 1 L
EXPTIME-PRE: 7.69 SEC
FEF2575-%PRED-PRE: 49 %
FEF2575-PRE: 1.5 L/SEC
FEF2575-PRED: 3.05 L/SEC
FEFMAX-%PRED-PRE: 84 %
FEFMAX-PRE: 7.88 L/SEC
FEFMAX-PRED: 9.3 L/SEC
FEV1-%PRED-PRE: 62 %
FEV1-PRE: 2.28 L
FEV1FEV6-PRE: 76 %
FEV1FEV6-PRED: 79 %
FEV1FVC-PRE: 73 %
FEV1FVC-PRED: 78 %
FEV1SVC-PRE: 73 %
FEV1SVC-PRED: 72 %
FIFMAX-PRE: 6.83 L/SEC
FRCPLETH-%PRED-PRE: 76 %
FRCPLETH-PRE: 2.77 L
FRCPLETH-PRED: 3.6 L
FVC-%PRED-PRE: 66 %
FVC-PRE: 3.11 L
FVC-PRED: 4.68 L
IC-%PRED-PRE: 32 %
IC-PRE: 1.31 L
IC-PRED: 4.01 L
RVPLETH-%PRED-PRE: 40 %
RVPLETH-PRE: 0.97 L
RVPLETH-PRED: 2.4 L
TLCPLETH-%PRED-PRE: 57 %
TLCPLETH-PRE: 4.08 L
TLCPLETH-PRED: 7.13 L
VA-%PRED-PRE: 64 %
VA-PRE: 4.21 L
VC-%PRED-PRE: 61 %
VC-PRE: 3.11 L
VC-PRED: 5.01 L

## 2022-01-13 LAB — FIO2-PRE: 36 %

## 2022-01-17 ENCOUNTER — PATIENT OUTREACH (OUTPATIENT)
Dept: PALLIATIVE CARE | Facility: CLINIC | Age: 62
End: 2022-01-17
Payer: COMMERCIAL

## 2022-01-17 DIAGNOSIS — J84.9 ILD (INTERSTITIAL LUNG DISEASE) (H): ICD-10-CM

## 2022-01-17 DIAGNOSIS — R06.02 SHORTNESS OF BREATH: ICD-10-CM

## 2022-01-17 NOTE — PROGRESS NOTES
Received voicemail from patient requesting refill of morphine.     Last refill: 12/24/2021  Last office visit: 1/7/2022  Follow up to be  per check out order    Will route request to MD for review.     Reviewed [place]  Report.

## 2022-01-18 RX ORDER — MORPHINE SULFATE 15 MG/1
15 TABLET, FILM COATED, EXTENDED RELEASE ORAL EVERY 12 HOURS
Qty: 60 TABLET | Refills: 0 | Status: SHIPPED | OUTPATIENT
Start: 2022-01-26 | End: 2022-02-21

## 2022-02-12 ENCOUNTER — HEALTH MAINTENANCE LETTER (OUTPATIENT)
Age: 62
End: 2022-02-12

## 2022-02-21 ENCOUNTER — PATIENT OUTREACH (OUTPATIENT)
Dept: PALLIATIVE CARE | Facility: CLINIC | Age: 62
End: 2022-02-21
Payer: COMMERCIAL

## 2022-02-21 DIAGNOSIS — R06.02 SHORTNESS OF BREATH: ICD-10-CM

## 2022-02-21 DIAGNOSIS — J84.9 ILD (INTERSTITIAL LUNG DISEASE) (H): ICD-10-CM

## 2022-02-21 NOTE — PROGRESS NOTES
Received voicemail from patient requesting refill of morphine.     Last refill: 1/26/2022  Last office visit: 1/7/2022  Scheduled for follow up 3/4/2022     Will route request to MD for review.     Reviewed [place]  Report.

## 2022-02-22 DIAGNOSIS — J84.9 ILD (INTERSTITIAL LUNG DISEASE) (H): ICD-10-CM

## 2022-02-22 DIAGNOSIS — R06.02 SHORTNESS OF BREATH: ICD-10-CM

## 2022-02-23 RX ORDER — MORPHINE SULFATE 15 MG/1
15 TABLET, FILM COATED, EXTENDED RELEASE ORAL EVERY 12 HOURS
Qty: 60 TABLET | Refills: 0 | OUTPATIENT
Start: 2022-02-23

## 2022-02-23 RX ORDER — MORPHINE SULFATE 15 MG/1
15 TABLET, FILM COATED, EXTENDED RELEASE ORAL EVERY 12 HOURS
Qty: 60 TABLET | Refills: 0 | Status: SHIPPED | OUTPATIENT
Start: 2022-02-24 | End: 2022-03-24

## 2022-03-04 ENCOUNTER — OFFICE VISIT (OUTPATIENT)
Dept: PALLIATIVE CARE | Facility: CLINIC | Age: 62
End: 2022-03-04
Attending: INTERNAL MEDICINE
Payer: COMMERCIAL

## 2022-03-04 VITALS
HEART RATE: 56 BPM | BODY MASS INDEX: 34.21 KG/M2 | SYSTOLIC BLOOD PRESSURE: 146 MMHG | DIASTOLIC BLOOD PRESSURE: 87 MMHG | OXYGEN SATURATION: 95 % | TEMPERATURE: 98.5 F | WEIGHT: 238.4 LBS

## 2022-03-04 DIAGNOSIS — J84.9 ILD (INTERSTITIAL LUNG DISEASE) (H): Primary | ICD-10-CM

## 2022-03-04 DIAGNOSIS — Z79.891 ENCOUNTER FOR LONG-TERM OPIATE ANALGESIC USE: ICD-10-CM

## 2022-03-04 DIAGNOSIS — R06.02 SHORTNESS OF BREATH: ICD-10-CM

## 2022-03-04 LAB — CREAT UR-MCNC: 197 MG/DL

## 2022-03-04 PROCEDURE — G0463 HOSPITAL OUTPT CLINIC VISIT: HCPCS

## 2022-03-04 PROCEDURE — 99215 OFFICE O/P EST HI 40 MIN: CPT | Performed by: INTERNAL MEDICINE

## 2022-03-04 PROCEDURE — 80307 DRUG TEST PRSMV CHEM ANLYZR: CPT | Performed by: INTERNAL MEDICINE

## 2022-03-04 ASSESSMENT — PAIN SCALES - GENERAL: PAINLEVEL: MILD PAIN (2)
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
## 2022-03-04 NOTE — PROGRESS NOTES
Palliative Care Outpatient Clinic      Patient ID:  Medical - He has ILD + undifferentiated connective tissue disease on 4-6 lpm O2. Not interested in lung transplant; smokes tobacco and cannabis.      We have been following for dyspnea, Dr Oliveira started morphine-->morphineER 15mg bid Summer 2021.     Social - Lives with wife Eve. 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  Cannabis and tobacco use disorders: continues to smoke despite health consequences & ineligiblity for lung transplantation; considers his smoking as integral to his qol and is not interested in stopping.      Care Planning - +HCD on file, names Myra as his agent. DNR/DNI POLST completed 11/2021. Hospice education completed (he is not eligible currently)    History:  History gathered today from: patient, medical chart, outside records including Care Everywhere  [place] [facility] [place]; LFTs nl    His condition is overall stable/ unchanged.  No major changes in dyspnea, HALL, cough, function, overall heatlh  Thinks morphine helps modestly; no side effects; stooling ok    Continues to smoke cannabis and tobacco    Mood ok: about to turn 62--'never thought I'd make it.' Going to see Manuel Mccloud in concert for his birthday.  Upset about the war.     PE: BP (!) 146/87   Pulse 56   Temp 98.5  F (36.9  C) (Oral)   Wt 108.1 kg (238 lb 6.4 oz)   SpO2 95%   BMI 34.21 kg/m     Wt Readings from Last 3 Encounters:   01/06/22 99.3 kg (219 lb)   11/05/21 103.4 kg (228 lb)   05/26/21 96.6 kg (213 lb)     Alert NAD  Clubbing  Clear sensorium  No cough, speaking full sentences  On o2      Data reviewed:  I reviewed recent labs and imaging, my comments:  6MWT 366 m in Jan (~stable)  Outside labs jan--Hgb 13, Plt 289, LFTs nl  Cr 0.7 in Nov, CO2 23 in Nov     database reviewed: y      Impression & Recommendations:  62 yo with severe ILD from CTD, chronic dyspnea, on chronic morphine 15 mg  bid    As above  No changes  'Annual'UDS today  Discussed opioid risk and safety with him at length  Follow-up 2-3 mo      Over 40 minutes spent on the date of the encounter doing chart review, history and exam, patient education & counseling, documentation and other activities as noted above.    Thank you for involving us in the patient's care.   Jostin Peralta MD / Palliative Medicine / [facility] via [facility].

## 2022-03-04 NOTE — NURSING NOTE
"Oncology Rooming Note    March 4, 2022 11:21 AM   Wilbert Hutchins is a 61 year old male who presents for:    Chief Complaint   Patient presents with     Oncology Clinic Visit     rtn for palliative care     Initial Vitals: BP (!) 146/87   Pulse 56   Temp 98.5  F (36.9  C) (Oral)   Wt 108.1 kg (238 lb 6.4 oz)   SpO2 95%   BMI 34.21 kg/m   Estimated body mass index is 34.21 kg/m  as calculated from the following:    Height as of 1/6/22: 1.778 m (5' 10\").    Weight as of this encounter: 108.1 kg (238 lb 6.4 oz). Body surface area is 2.31 meters squared.  Mild Pain (2) Comment: Data Unavailable   No LMP for male patient.  Allergies reviewed: Yes  Medications reviewed: Yes    Medications: Medication refills not needed today.  Pharmacy name entered into [employer] [place]: [facility] [address] [place] [address]    Clinical concerns: none       Monica Morgan, EMT            "

## 2022-03-08 LAB
GABAPENTIN UR QL CFM: PRESENT
HYDROMORPHONE UR CFM-MCNC: 320 NG/ML
HYDROMORPHONE/CREAT UR: 162 NG/MG {CREAT}
MORPHINE UR CFM-MCNC: >7000 NG/ML
MORPHINE/CREAT UR: ABNORMAL

## 2022-03-24 DIAGNOSIS — J84.9 ILD (INTERSTITIAL LUNG DISEASE) (H): ICD-10-CM

## 2022-03-24 DIAGNOSIS — R06.02 SHORTNESS OF BREATH: ICD-10-CM

## 2022-03-24 RX ORDER — MORPHINE SULFATE 15 MG/1
15 TABLET, FILM COATED, EXTENDED RELEASE ORAL EVERY 12 HOURS
Qty: 60 TABLET | Refills: 0 | Status: SHIPPED | OUTPATIENT
Start: 2022-03-26 | End: 2022-04-21

## 2022-03-24 NOTE — TELEPHONE ENCOUNTER
Received voicemail from patient requesting refill of MS Contin.     Last refill: 2/24/2022  Last office visit: 3/4/2022  To be scheduled per check out order    Will route request to MD for review.     Reviewed [place]  Report.

## 2022-04-21 ENCOUNTER — PATIENT OUTREACH (OUTPATIENT)
Dept: PALLIATIVE CARE | Facility: CLINIC | Age: 62
End: 2022-04-21
Payer: COMMERCIAL

## 2022-04-21 DIAGNOSIS — J84.9 ILD (INTERSTITIAL LUNG DISEASE) (H): ICD-10-CM

## 2022-04-21 DIAGNOSIS — R06.02 SHORTNESS OF BREATH: ICD-10-CM

## 2022-04-21 RX ORDER — MORPHINE SULFATE 15 MG/1
15 TABLET, FILM COATED, EXTENDED RELEASE ORAL EVERY 12 HOURS
Qty: 60 TABLET | Refills: 0 | Status: SHIPPED | OUTPATIENT
Start: 2022-04-23 | End: 2022-05-19

## 2022-04-21 NOTE — PROGRESS NOTES
Received voicemail from patient requesting refill of morphine.     Last refill: 3/26/2022  Last office visit: 3/4/2022  Message to  for follow up apt    Will route request to MD for review.     Reviewed [place]  Report.

## 2022-05-19 ENCOUNTER — PATIENT OUTREACH (OUTPATIENT)
Dept: PALLIATIVE CARE | Facility: CLINIC | Age: 62
End: 2022-05-19

## 2022-05-19 DIAGNOSIS — R06.02 SHORTNESS OF BREATH: ICD-10-CM

## 2022-05-19 DIAGNOSIS — J84.9 ILD (INTERSTITIAL LUNG DISEASE) (H): ICD-10-CM

## 2022-05-19 RX ORDER — MORPHINE SULFATE 15 MG/1
15 TABLET, FILM COATED, EXTENDED RELEASE ORAL EVERY 12 HOURS
Qty: 60 TABLET | Refills: 0 | Status: SHIPPED | OUTPATIENT
Start: 2022-05-21 | End: 2022-06-15

## 2022-05-19 NOTE — PROGRESS NOTES
Received voicemail from patient requesting refill of morphine.     Last refill: 4/23/2022  Last office visit: 3/4/2022  Scheduled for follow up 6/15/2022    Will route request to MD for review.     Reviewed [place]  Report.

## 2022-06-12 ASSESSMENT — ENCOUNTER SYMPTOMS
SNORES LOUDLY: 0
COUGH: 1
NECK PAIN: 1
ARTHRALGIAS: 1
POSTURAL DYSPNEA: 1
MYALGIAS: 1
BACK PAIN: 1
WHEEZING: 0
JOINT SWELLING: 1
STIFFNESS: 1
HEMOPTYSIS: 0
SPUTUM PRODUCTION: 1
SHORTNESS OF BREATH: 1
COUGH DISTURBING SLEEP: 1
MUSCLE WEAKNESS: 0
MUSCLE CRAMPS: 0
DYSPNEA ON EXERTION: 1

## 2022-06-15 ENCOUNTER — OFFICE VISIT (OUTPATIENT)
Dept: PULMONOLOGY | Facility: CLINIC | Age: 62
End: 2022-06-15
Attending: INTERNAL MEDICINE
Payer: COMMERCIAL

## 2022-06-15 ENCOUNTER — OFFICE VISIT (OUTPATIENT)
Dept: PALLIATIVE CARE | Facility: CLINIC | Age: 62
End: 2022-06-15
Attending: INTERNAL MEDICINE
Payer: COMMERCIAL

## 2022-06-15 VITALS
OXYGEN SATURATION: 96 % | RESPIRATION RATE: 20 BRPM | TEMPERATURE: 97.3 F | DIASTOLIC BLOOD PRESSURE: 75 MMHG | HEART RATE: 58 BPM | SYSTOLIC BLOOD PRESSURE: 125 MMHG

## 2022-06-15 VITALS
HEART RATE: 52 BPM | SYSTOLIC BLOOD PRESSURE: 138 MMHG | OXYGEN SATURATION: 99 % | BODY MASS INDEX: 33 KG/M2 | WEIGHT: 230 LBS | DIASTOLIC BLOOD PRESSURE: 78 MMHG

## 2022-06-15 DIAGNOSIS — R06.02 SHORTNESS OF BREATH: ICD-10-CM

## 2022-06-15 DIAGNOSIS — J84.9 ILD (INTERSTITIAL LUNG DISEASE) (H): ICD-10-CM

## 2022-06-15 DIAGNOSIS — J44.1 COPD EXACERBATION (H): ICD-10-CM

## 2022-06-15 DIAGNOSIS — J41.0 SIMPLE CHRONIC BRONCHITIS (H): Primary | ICD-10-CM

## 2022-06-15 DIAGNOSIS — J84.9 ILD (INTERSTITIAL LUNG DISEASE) (H): Primary | ICD-10-CM

## 2022-06-15 PROCEDURE — G0463 HOSPITAL OUTPT CLINIC VISIT: HCPCS | Mod: 25

## 2022-06-15 PROCEDURE — 99214 OFFICE O/P EST MOD 30 MIN: CPT | Mod: GC | Performed by: INTERNAL MEDICINE

## 2022-06-15 PROCEDURE — 94729 DIFFUSING CAPACITY: CPT | Performed by: INTERNAL MEDICINE

## 2022-06-15 PROCEDURE — G0463 HOSPITAL OUTPT CLINIC VISIT: HCPCS | Mod: 27

## 2022-06-15 PROCEDURE — 99214 OFFICE O/P EST MOD 30 MIN: CPT | Mod: 25 | Performed by: INTERNAL MEDICINE

## 2022-06-15 PROCEDURE — 94375 RESPIRATORY FLOW VOLUME LOOP: CPT | Performed by: INTERNAL MEDICINE

## 2022-06-15 PROCEDURE — 94726 PLETHYSMOGRAPHY LUNG VOLUMES: CPT | Performed by: INTERNAL MEDICINE

## 2022-06-15 RX ORDER — IPRATROPIUM BROMIDE AND ALBUTEROL SULFATE 2.5; .5 MG/3ML; MG/3ML
1 SOLUTION RESPIRATORY (INHALATION) EVERY 6 HOURS PRN
Qty: 90 ML | Refills: 3 | Status: SHIPPED | OUTPATIENT
Start: 2022-06-15 | End: 2024-03-07

## 2022-06-15 RX ORDER — PREDNISONE 10 MG/1
40 TABLET ORAL DAILY
Qty: 20 TABLET | Refills: 0 | Status: SHIPPED | OUTPATIENT
Start: 2022-06-15 | End: 2022-06-20

## 2022-06-15 RX ORDER — MORPHINE SULFATE 15 MG/1
15 TABLET, FILM COATED, EXTENDED RELEASE ORAL EVERY 12 HOURS
Qty: 60 TABLET | Refills: 0 | Status: SHIPPED | OUTPATIENT
Start: 2022-06-15 | End: 2022-07-22

## 2022-06-15 ASSESSMENT — PAIN SCALES - GENERAL: PAINLEVEL: NO PAIN (0)

## 2022-06-15 NOTE — NURSING NOTE
"Oncology Rooming Note    Fany 15, 2022 12:57 PM   Wilbert Hutchins is a 62 year old male who presents for:    Chief Complaint   Patient presents with     Oncology Clinic Visit     Palliative care      Initial Vitals: /75 (BP Location: Left arm, Patient Position: Sitting, Cuff Size: Adult Regular)   Pulse 58   Temp 97.3  F (36.3  C) (Tympanic)   Resp 20   SpO2 96%  Estimated body mass index is 33 kg/m  as calculated from the following:    Height as of 1/6/22: 1.778 m (5' 10\").    Weight as of an earlier encounter on 6/15/22: 104.3 kg (230 lb). There is no height or weight on file to calculate BSA.  Data Unavailable Comment: Data Unavailable   No LMP for male patient.  Allergies reviewed: Yes  Medications reviewed: Yes    Medications: Medication refills not needed today.  Pharmacy name entered into [facility]: [facility] [address] [place] [address]    Clinical concerns: Patient had PFT done today. On 3 liters of oxygen in clinic, usually on 5 liters at home. Please discuss pain and discomfort management.        Maura Paris LPN Fany 15, 2022 12:58 PM              "

## 2022-06-15 NOTE — PATIENT INSTRUCTIONS
For clinical questions, please call our nursing line [phone]    For scheduling, please call [phone]

## 2022-06-15 NOTE — LETTER
"6/15/2022       RE: Wilbert Hutchins  [address]  [facility] [id number]     Dear Colleague,    Thank you for referring your patient, Wilbert Hutchins, to the [facility] at [facility]. Please see a copy of my visit note below.    Palliative Care Outpatient Clinic Consultation Note    Patient:  Wilbert Hutchins      Patient ID: (Summary mostly from Dr. Peralta's note 3/4/2022)    Medical - He has ILD + undifferentiated connective tissue disease on 6 lpm (previously on 4L) O2. Not interested in lung transplant; smokes tobacco and cannabis daily.      We have been following for dyspnea, Dr Oliveira started morphine-->morphineER 15mg bid Summer 2021.     Social - Lives with wife Eve. 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  Cannabis and tobacco use disorders: continues to smoke despite health consequences & ineligiblity for lung transplantation; considers his smoking as integral to his qol and is not interested in stopping. Entraged relationships with children \"their choice, no mine.\"     Care Planning - +HCD on file, names Myra as his agent. DNR/DNI POLST completed 11/2021. Hospice education completed (he is not eligible currently)     Opioid Safety - +Naloxone      History:  -Reports some increased productive sputum both in the morning and throughout the day. Met with pulm this morning and the plan is start steroids and nebulizers to see if that helps. Has increasing dyspnea with even minimal activity now. Less able to walk around his yard, not able to stand and do dishes. Spends most of time sitting. Using oxygen 24 hours/day. Reports desatting to the high 80s with walking. Continues smoking oxygen and cannabis daily.     Patient's Disease Understanding:   -Understands that his prognosis is limited. He is hoping for at least two more years but made a number of " "comments about living for much longer. He remembers a conversation with Dr. Peralta where he told the patient the could live for 2 weeks or 2 years .     Coping:    -Eve his partner is his main source of support. No other important people in his life. Estraged from children according to the patient. He has some beliefs about existence after death but is not confident about these. \"We'll find out if there is a point to all this.\" Cannabis helps him cope with his limited function.       /75 (BP Location: Left arm, Patient Position: Sitting, Cuff Size: Adult Regular)   Pulse 58   Temp 97.3  F (36.3  C) (Tympanic)   Resp 20   SpO2 96%   GENERAL: Comfortable-appearing, alert and no distress  EYES: Eyes grossly normal to inspection, conjunctivae and sclerae normal  Hearing intact.   RESP: normal effort. Tripoding. Speaks in full sentences. Poor air movements, some high pitched expiratory wheezing.   SKIN: no suspicious lesions or rashes on exposed skin  NEURO: Cranial nerves grossly intact, mentation intact and speech normal.  No tremor.   PSYCH: mentation appears normal, affect normal/bright and mood-congruent, judgement and insight intact, normal speech and appearance     Wt Readings from Last 10 Encounters:   06/15/22 104.3 kg (230 lb)   03/04/22 108.1 kg (238 lb 6.4 oz)   01/06/22 99.3 kg (219 lb)   11/05/21 103.4 kg (228 lb)   05/26/21 96.6 kg (213 lb)   12/03/20 100 kg (220 lb 6.4 oz)   11/19/20 99.3 kg (219 lb)   12/18/19 100.2 kg (221 lb)   09/25/19 100.2 kg (221 lb)   07/19/19 102.4 kg (225 lb 11.2 oz)       Data Reviewed:  LABS:   Recent Labs   Lab Test 05/31/19  1113 01/12/15  1056     --    POTASSIUM 3.8  --    CHLORIDE 107  --    CO2 24  --    ANIONGAP 8  --    * 103*   BUN 11  --    CR 0.91  --    PIETRO 9.7  --        IMAGING:     CT chest 9/1/21    Findings:      Lungs: Numerous bilateral calcified granulomas. Stable 9 mm subpleural  nodule along the right minor fissure, most likely " lymph node (series 4  image 137). No suspicious pulmonary nodule. Stable extent of  subpleural reticular opacities and basilar predominant honeycombing  with multifocal traction bronchiectasis. No acute focal consolidation.  No significant air trapping on expiratory images  Airways: Central tracheobronchial tree is clear.  Vessels: Main pulmonary artery and aorta are normal in caliber.  Heart: Heart size is normal without pericardial effusion. Mild  coronary calcifications.  Lymph nodes: . Stable prominent mediastinal lymph nodes. No suspicious  lymphadenopathy.  Thyroid: Within normal limits.  Esophagus: Within normal limits     Upper abdomen: Within normal limits.     Bones and soft tissues: No suspicious axillary lymphadenopathy or soft  tissue mass. No suspicious osseous lesion.                                                                      Impression: Stable ATS alternative diagnosis to UIP type interstitial  lung disease, possibly DIP or fibrotic NSIP. No evidence of acute  exacerbation.      Impressions, Recommendations & Counseling:  Palliative Performance Score:  60      Decision Making Capacity:  present    Wilbert Hutchins is a 62 year old with ILD and undifferentiated connective tissue disease on 6 LPM O2 who is not a transplant candidate who presents  (previously on 4L) O2. Not interested in lung transplant; smokes tobacco and cannabis daily.     1) Chronic dyspnea  -Using 6L NC consistently with limited function. He thinks that low dose morphine ER 15mg BID might be helping but he's not sure. He's not interested in making any changes right now since his health is stable.  PDMP checked. Last urine tox had hydromorphone (<5%), which is a metabolite of morphine, so no concerns for misuse.  Okay for refill.     2) Cough  -Likely secondary to disease progression as well as ongoing smoking cigarettes and cannabis. Patient looking forward to starting steroids + nebulizers prescribed by pulmonary  "to help improve symptoms. The patient is already on opioids and gabapentin which are two good agents to treat cough. In the future, could consider increasing doses if symptoms become intolerable.  I also provided education about the danger of smoking while using oxygen (patient takes off his O2 while smoking).     3) Care Planning  -We discussed the patient's hope to be able to spend some time up in [facility]. Now that the patient's dog , he feels like he has the freedom to go up for an extended period of time. Otherwise, he reports no other major \"unfinished business.\" He worries about the financial cost of his own burial for his partner, Eve.   -Discussed the benefits of hospice to provide in-home care. He believes Eve is capable of caring for him in his home if his function were to decline.     Jose F Woodruff MD   Palliative Medicine Fellow   Staffed with Dr. Linares    Follow up in 3 months     Patient seen and evaluated with Dr. Woodruff.    Agree with assessment and recommendations as documented in this note.     Lenore Linares MD  Palliative Medicine  Pager [phone]      Sincerely,    Jose F Woodruff MD  "

## 2022-06-15 NOTE — PROGRESS NOTES
[facility]  ILD Clinic - Return Visit -  Fany 15, 2022         Assessment and Plan:   Wilbert Hutchins is a 61 year old male with a history of interstitial lung disease who presents for a return visit.     1) Interstitial lung disease  - Indeterminate for UIP, DDx include DIP and NSIP  - He continues to smoke cigarettes (5-15 cig/day) and marijuana, he again re-emphasized his quality of life over respiratory disease.  He continues to see palliative care here at the [facility] which he thinks is helping.   - Lung transplantation discussed previously by Dr. Lin, and he understands he will not be a candidate for lung transplantation due to his current smoking.    - His PFTs today show overall stability compared to prior years, it does seem to be declining but doubt it is a significant value and could be solely explained by aging.   - For his productive cough, which continues despite albuterol inhaler and it is more nocturnal, we will try duoneb nebulizer covering for chronic bronchitis. I am also prescribing him a 5-days prednisone course of 40 mg daily, then back to long term 10 mg daily.     A) Supplemental oxygen  - Using portable oxygen with compliance, needs up to 6 LPM with activity.     B) Nocturnal oximetry  - Nocturnal oximetry on 3 LPM was ordered, the results are not available to me today. He is using oxygen at nighttime at 3 LPM.     2) Continued Tobacco Use  -As above, we discussed the importance of smoking cessation.  He will reach out to us if he feels as though he can benefit from smoking cessation programs.    RTC: 6 months  Influenza and other vaccinations: Up to date on Prevnar. He has received the Pfizer COVID-19 vaccine, including booster dose.    I spent 25 minutes  total today, reviewing medical records including progress notes, multiple imaging studies from his previous available records, and documentation.      Matt Suárez,  MD  Pulmonary and Critical Care Medicine          Problem List:     1. Interstitial lung disease  a. HRCT (May 31, 2019) Indeterminate for UIP;   i. Peripheral reticulation, mild paraseptal emphysema, GGO and peripheral reticulation and fibrosis basilar predominant. Mediastinal lymphadenopathy.  No air trapping on expiratory views.  b. Pulm: Dr. Jack Machado ([facility])  c. Rheum: Dr. Rg Deleon ([facility])  d. Treatment   i. On Prednisone since March 2018, current dose 10 mg daily   ii. CellCept initiated in 2019 but could not tolerate due to exacerbation of arthralgias  iii. On Plaquinil   iv. Prednisone dose currently at 10mg daily     2. Tobacco and Marijuana use     3. Migrating arthralgias  a. Positive RF (52) and Anti-CCP (76)  b. No erosive arthritis on right hand film (April 6, 2017)     4. Obesity (Body mass index is 32.38 kg/m .)        Interval History:     Wilbert Hutchins is a 61 year old male with interstitial lung disease who presents for a return visit.  He was last seen in 1/2022.    Since his last visit, his dyspnea remains stable with minimal activity. Using oxygen at 6 LPM with walking. Noted desaturation to 57% by his wife when he was asleep and oxygen was off with movement, she was able to wake him up and he recovered with re-applying the cannula.     He is complaining of excessive cough and sputum production especially when he wakes up in the morning, albuterol only helps minimally and tessalon did not seem to be helpful in the past. He used a nebulizer around 3 years ago during hospitalization and at the time did not seem to be helpful.            Physical Exam:   /78 (BP Location: Left arm, Patient Position: Chair, Cuff Size: Adult Regular)   Pulse 52   Wt 104.3 kg (230 lb)   SpO2 99%   BMI 33.00 kg/m        GENERAL: alert, NAD  HEENT: NCAT, EOMI, masked  Neck: no cervical or supraclavicular adenopathy  Lungs: decreased air movement, pan-inspiratory  crackles more prominent at bases  CV: RRR, S1S2, no murmurs noted  Abdomen: normoactive BS, soft, non tender   Lymph: no edema  Neuro: AAO X 3  Psychiatric: normal affect, good eye contact  Skin: no rash, jaundice or lesions on limited exam  Extremities: No cyanosis, clubbing, trace edema.         Imaging:     HRCT Chest ([facility]; 9/1/2021)  Reviewed images, agreed with radiologist report:   Impression: Stable ATS alternative diagnosis to UIP type interstitial lung disease, possibly DIP or fibrotic NSIP. No evidence of acute exacerbation.    HRCT chest w/o contrast (May 31, 2019)  Reviewed images, agreed with radiologist report:   IMPRESSION:   1. Fibrotic changes of the lungs consistent with interstitial lung  disease. Pattern is most consistent with a non-IPF diagnosis.  Differential includes DIP and NSIP. Pattern is indeterminate for UIP  per ATS 2019 criteria.  2. Mild mediastinal lymphadenopathy, possibly reactive.  3. Mild coronary artery disease.  My read:   Peripheral reticulation, mild paraseptal emphysema, GGO and peripheral reticulation and fibrosis basilar predominant. Mediastinal lymphadenopathy.  No air trapping on expiratory views.         Pulmonary Function Tests:                Recent Results (from the past 168 hour(s))   General PFT Lab (Please always keep checked)    Collection Time: 06/15/22 10:05 AM   Result Value Ref Range    FVC-Pred 4.64 L    FVC-Pre 2.99 L    FVC-%Pred-Pre 64 %    FEV1-Pre 2.27 L    FEV1-%Pred-Pre 63 %    FEV1FVC-Pred 77 %    FEV1FVC-Pre 76 %    FEFMax-Pred 9.22 L/sec    FEFMax-Pre 5.89 L/sec    FEFMax-%Pred-Pre 63 %    FEF2575-Pred 2.99 L/sec    FEF2575-Pre 1.83 L/sec    VLK5211-%Pred-Pre 61 %    ExpTime-Pre 7.19 sec    FIFMax-Pre 6.04 L/sec    VC-Pred 4.99 L    VC-Pre 3.08 L    VC-%Pred-Pre 61 %    IC-Pred 4.08 L    IC-Pre 1.91 L    IC-%Pred-Pre 46 %    ERV-Pred 0.91 L    ERV-Pre 1.17 L    ERV-%Pred-Pre 128 %    FEV1FEV6-Pred 79 %    FEV1FEV6-Pre 76 %    FRCPleth-Pred 3.61 L     FRCPleth-Pre 2.97 L    FRCPleth-%Pred-Pre 82 %    RVPleth-Pred 2.42 L    RVPleth-Pre 1.80 L    RVPleth-%Pred-Pre 74 %    TLCPleth-Pred 7.13 L    TLCPleth-Pre 4.88 L    TLCPleth-%Pred-Pre 68 %    DLCOunc-Pred 27.63 ml/min/mmHg    DLCOunc-Pre 11.89 ml/min/mmHg    DLCOunc-%Pred-Pre 43 %    VA-Pre 4.07 L    VA-%Pred-Pre 62 %    FEV1SVC-Pred 72 %    FEV1SVC-Pre 74 %              Cardiac:     TTE (11/24/2020)  Interpretation Summary  Global and regional left ventricular function is normal with an EF of 60-65%.  Global right ventricular function is normal.  There is late appearance of bubbles in the left heart on the bubble study. A small patent foramen ovale cannot be excluded.  No significant valvular abnormalities present.  The inferior vena cava was normal in size with preserved respiratory variability.  No pericardial effusion is present.    Transthoracic echocardiogram (July 19, 2019)  Global and regional left ventricular function is normal with an EF of 60-65%.  Right ventricular function, chamber size, wall motion, and thickness are  Normal.   The atrial septum is intact as assessed by color Doppler and agitated saline  bubble study.  Pulmonary artery systolic pressure cannot be assessed.  The inferior vena cava is normal.  No pericardial effusion is present.  *PA acceleration time reported as 130msec (normal to borderline)     Stress echo (March 22, 2017)  1. No ischemia at the cardiac workload achieved.  2. Left ventricular function normal at rest, improved with stress.  3. No ischemic ECG response with adequate heart rate.  4. The patient did not report angina with stress.  5. Exercise capacity was good .  6. The baseline echocardiogram revealed no significant valvular  abnormalities. There is focal non specific change of the aortic valve.  7. Normal blood pressure response with exercise.         Other:     Answers for HPI/ROS submitted by the patient on 6/12/2022  General Symptoms: No  Skin Symptoms:  No  HENT Symptoms: No  EYE SYMPTOMS: No  HEART SYMPTOMS: No  LUNG SYMPTOMS: Yes  INTESTINAL SYMPTOMS: No  URINARY SYMPTOMS: No  REPRODUCTIVE SYMPTOMS: No  SKELETAL SYMPTOMS: Yes  BLOOD SYMPTOMS: No  NERVOUS SYSTEM SYMPTOMS: No  MENTAL HEALTH SYMPTOMS: No  Cough: Yes  Sputum or phlegm: Yes  Coughing up blood: No  Difficulty breating or shortness of breath: Yes  Snoring: No  Wheezing: No  Difficulty breathing on exertion: Yes  Nighttime Cough: Yes  Difficulty breathing when lying flat: Yes  Back pain: Yes  Muscle aches: Yes  Neck pain: Yes  Swollen joints: Yes  Joint pain: Yes  Bone pain: No  Muscle cramps: No  Muscle weakness: No  Joint stiffness: Yes  Bone fracture: No

## 2022-06-15 NOTE — NURSING NOTE
Chief Complaint   Patient presents with     Interstitial Lung Disease (ILD)     ILD Follow up     Vitals were taken and medications were reconciled.     Pam Curran RMA  10:35 AM

## 2022-06-15 NOTE — PATIENT INSTRUCTIONS
Thank you for seeing the Palliative Care Clinic today.      1. It was good meeting you today. Thank you for coming in for a chat.     Return to clinic 3 months for a follow-up.      You can reach the Palliative Care Team during business hours at the following numbers:   -For the [facility], call [phone]  -To reach the palliative RN for questions or refills, call [phone]       To reach the Palliative Care Provider on-call After-hours or on holidays and weekends, call: [phone].  Please note that we are not able to provide pain medication refills on evenings or weekends.

## 2022-06-15 NOTE — LETTER
6/15/2022         RE: Wilbert Hutchins  [address]  [facility] [address]        Dear Colleague,    Thank you for referring your patient, Wilbert Hutchins, to the [facility] [place]. Please see a copy of my visit note below.    [facility]  ILD Clinic - Return Visit -  Fany 15, 2022         Assessment and Plan:   Wilbert Hutchins is a 61 year old male with a history of interstitial lung disease who presents for a return visit.     1) Interstitial lung disease  - Indeterminate for UIP, DDx include DIP and NSIP  - He continues to smoke cigarettes (5-15 cig/day) and marijuana, he again re-emphasized his quality of life over respiratory disease.  He continues to see palliative care here at the [facility] which he thinks is helping.   - Lung transplantation discussed previously by Dr. Lin, and he understands he will not be a candidate for lung transplantation due to his current smoking.    - His PFTs today show overall stability compared to prior years, it does seem to be declining but doubt it is a significant value and could be solely explained by aging.   - For his productive cough, which continues despite albuterol inhaler and it is more nocturnal, we will try duoneb nebulizer covering for chronic bronchitis. I am also prescribing him a 5-days prednisone course of 40 mg daily, then back to long term 10 mg daily.     A) Supplemental oxygen  - Using portable oxygen with compliance, needs up to 6 LPM with activity.     B) Nocturnal oximetry  - Nocturnal oximetry on 3 LPM was ordered, the results are not available to me today. He is using oxygen at nighttime at 3 LPM.     2) Continued Tobacco Use  -As above, we discussed the importance of smoking cessation.  He will reach out to us if he feels as though he can benefit from smoking cessation programs.    RTC: 6 months  Influenza and  other vaccinations: Up to date on Prevnar. He has received the Pfizer COVID-19 vaccine, including booster dose.    I spent 25 minutes  total today, reviewing medical records including progress notes, multiple imaging studies from his previous available records, and documentation.      Matt Suárez MD  Pulmonary and Critical Care Medicine          Problem List:     1. Interstitial lung disease  a. HRCT (May 31, 2019) Indeterminate for UIP;   i. Peripheral reticulation, mild paraseptal emphysema, GGO and peripheral reticulation and fibrosis basilar predominant. Mediastinal lymphadenopathy.  No air trapping on expiratory views.  b. Pulm: Dr. Jack Machado ([facility])  c. Rheum: Dr. Rg Deleon ([facility])  d. Treatment   i. On Prednisone since March 2018, current dose 10 mg daily   ii. CellCept initiated in 2019 but could not tolerate due to exacerbation of arthralgias  iii. On Plaquinil   iv. Prednisone dose currently at 10mg daily     2. Tobacco and Marijuana use     3. Migrating arthralgias  a. Positive RF (52) and Anti-CCP (76)  b. No erosive arthritis on right hand film (April 6, 2017)     4. Obesity (Body mass index is 32.38 kg/m .)        Interval History:     Wilbert Hutchins is a 61 year old male with interstitial lung disease who presents for a return visit.  He was last seen in 1/2022.    Since his last visit, his dyspnea remains stable with minimal activity. Using oxygen at 6 LPM with walking. Noted desaturation to 57% by his wife when he was asleep and oxygen was off with movement, she was able to wake him up and he recovered with re-applying the cannula.     He is complaining of excessive cough and sputum production especially when he wakes up in the morning, albuterol only helps minimally and tessalon did not seem to be helpful in the past. He used a nebulizer around 3 years ago during hospitalization and at the time did not seem to be helpful.            Physical Exam:    /78 (BP Location: Left arm, Patient Position: Chair, Cuff Size: Adult Regular)   Pulse 52   Wt 104.3 kg (230 lb)   SpO2 99%   BMI 33.00 kg/m        GENERAL: alert, NAD  HEENT: NCAT, EOMI, masked  Neck: no cervical or supraclavicular adenopathy  Lungs: decreased air movement, pan-inspiratory crackles more prominent at bases  CV: RRR, S1S2, no murmurs noted  Abdomen: normoactive BS, soft, non tender   Lymph: no edema  Neuro: AAO X 3  Psychiatric: normal affect, good eye contact  Skin: no rash, jaundice or lesions on limited exam  Extremities: No cyanosis, clubbing, trace edema.         Imaging:     HRCT Chest ([facility]; 9/1/2021)  Reviewed images, agreed with radiologist report:   Impression: Stable ATS alternative diagnosis to UIP type interstitial lung disease, possibly DIP or fibrotic NSIP. No evidence of acute exacerbation.    HRCT chest w/o contrast (May 31, 2019)  Reviewed images, agreed with radiologist report:   IMPRESSION:   1. Fibrotic changes of the lungs consistent with interstitial lung  disease. Pattern is most consistent with a non-IPF diagnosis.  Differential includes DIP and NSIP. Pattern is indeterminate for UIP  per ATS 2019 criteria.  2. Mild mediastinal lymphadenopathy, possibly reactive.  3. Mild coronary artery disease.  My read:   Peripheral reticulation, mild paraseptal emphysema, GGO and peripheral reticulation and fibrosis basilar predominant. Mediastinal lymphadenopathy.  No air trapping on expiratory views.         Pulmonary Function Tests:                Recent Results (from the past 168 hour(s))   General PFT Lab (Please always keep checked)    Collection Time: 06/15/22 10:05 AM   Result Value Ref Range    FVC-Pred 4.64 L    FVC-Pre 2.99 L    FVC-%Pred-Pre 64 %    FEV1-Pre 2.27 L    FEV1-%Pred-Pre 63 %    FEV1FVC-Pred 77 %    FEV1FVC-Pre 76 %    FEFMax-Pred 9.22 L/sec    FEFMax-Pre 5.89 L/sec    FEFMax-%Pred-Pre 63 %    FEF2575-Pred 2.99 L/sec    FEF2575-Pre 1.83 L/sec     EIM2101-%Pred-Pre 61 %    ExpTime-Pre 7.19 sec    FIFMax-Pre 6.04 L/sec    VC-Pred 4.99 L    VC-Pre 3.08 L    VC-%Pred-Pre 61 %    IC-Pred 4.08 L    IC-Pre 1.91 L    IC-%Pred-Pre 46 %    ERV-Pred 0.91 L    ERV-Pre 1.17 L    ERV-%Pred-Pre 128 %    FEV1FEV6-Pred 79 %    FEV1FEV6-Pre 76 %    FRCPleth-Pred 3.61 L    FRCPleth-Pre 2.97 L    FRCPleth-%Pred-Pre 82 %    RVPleth-Pred 2.42 L    RVPleth-Pre 1.80 L    RVPleth-%Pred-Pre 74 %    TLCPleth-Pred 7.13 L    TLCPleth-Pre 4.88 L    TLCPleth-%Pred-Pre 68 %    DLCOunc-Pred 27.63 ml/min/mmHg    DLCOunc-Pre 11.89 ml/min/mmHg    DLCOunc-%Pred-Pre 43 %    VA-Pre 4.07 L    VA-%Pred-Pre 62 %    FEV1SVC-Pred 72 %    FEV1SVC-Pre 74 %              Cardiac:     TTE (11/24/2020)  Interpretation Summary  Global and regional left ventricular function is normal with an EF of 60-65%.  Global right ventricular function is normal.  There is late appearance of bubbles in the left heart on the bubble study. A small patent foramen ovale cannot be excluded.  No significant valvular abnormalities present.  The inferior vena cava was normal in size with preserved respiratory variability.  No pericardial effusion is present.    Transthoracic echocardiogram (July 19, 2019)  Global and regional left ventricular function is normal with an EF of 60-65%.  Right ventricular function, chamber size, wall motion, and thickness are  Normal.   The atrial septum is intact as assessed by color Doppler and agitated saline  bubble study.  Pulmonary artery systolic pressure cannot be assessed.  The inferior vena cava is normal.  No pericardial effusion is present.  *PA acceleration time reported as 130msec (normal to borderline)     Stress echo (March 22, 2017)  1. No ischemia at the cardiac workload achieved.  2. Left ventricular function normal at rest, improved with stress.  3. No ischemic ECG response with adequate heart rate.  4. The patient did not report angina with stress.  5. Exercise capacity was good  .  6. The baseline echocardiogram revealed no significant valvular  abnormalities. There is focal non specific change of the aortic valve.  7. Normal blood pressure response with exercise.         Other:     Answers for HPI/ROS submitted by the patient on 6/12/2022  General Symptoms: No  Skin Symptoms: No  HENT Symptoms: No  EYE SYMPTOMS: No  HEART SYMPTOMS: No  LUNG SYMPTOMS: Yes  INTESTINAL SYMPTOMS: No  URINARY SYMPTOMS: No  REPRODUCTIVE SYMPTOMS: No  SKELETAL SYMPTOMS: Yes  BLOOD SYMPTOMS: No  NERVOUS SYSTEM SYMPTOMS: No  MENTAL HEALTH SYMPTOMS: No  Cough: Yes  Sputum or phlegm: Yes  Coughing up blood: No  Difficulty breating or shortness of breath: Yes  Snoring: No  Wheezing: No  Difficulty breathing on exertion: Yes  Nighttime Cough: Yes  Difficulty breathing when lying flat: Yes  Back pain: Yes  Muscle aches: Yes  Neck pain: Yes  Swollen joints: Yes  Joint pain: Yes  Bone pain: No  Muscle cramps: No  Muscle weakness: No  Joint stiffness: Yes  Bone fracture: No          Again, thank you for allowing me to participate in the care of your patient.        Sincerely,        Matt Suárez MD

## 2022-06-15 NOTE — PROGRESS NOTES
"Palliative Care Outpatient Clinic Consultation Note    Patient:  Wilbert Hutchins      Patient ID: (Summary mostly from Dr. Peralta's note 3/4/2022)    Medical - He has ILD + undifferentiated connective tissue disease on 6 lpm (previously on 4L) O2. Not interested in lung transplant; smokes tobacco and cannabis daily.      We have been following for dyspnea, Dr Oliveira started morphine-->morphineER 15mg bid Summer 2021.     Social - Lives with wife Eve. 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  Cannabis and tobacco use disorders: continues to smoke despite health consequences & ineligiblity for lung transplantation; considers his smoking as integral to his qol and is not interested in stopping. Entraged relationships with children \"their choice, no mine.\"     Care Planning - +HCD on file, names Myra as his agent. DNR/DNI POLST completed 11/2021. Hospice education completed (he is not eligible currently)     Opioid Safety - +Naloxone      History:  -Reports some increased productive sputum both in the morning and throughout the day. Met with pulm this morning and the plan is start steroids and nebulizers to see if that helps. Has increasing dyspnea with even minimal activity now. Less able to walk around his yard, not able to stand and do dishes. Spends most of time sitting. Using oxygen 24 hours/day. Reports desatting to the high 80s with walking. Continues smoking oxygen and cannabis daily.     Patient's Disease Understanding:   -Understands that his prognosis is limited. He is hoping for at least two more years but made a number of comments about living for much longer. He remembers a conversation with Dr. Peralta where he told the patient the could live for 2 weeks or 2 years .     Coping:    -Eve his partner is his main source of support. No other important people in his life. Estraged from children according to the patient. He has some beliefs " "about existence after death but is not confident about these. \"We'll find out if there is a point to all this.\" Cannabis helps him cope with his limited function.       /75 (BP Location: Left arm, Patient Position: Sitting, Cuff Size: Adult Regular)   Pulse 58   Temp 97.3  F (36.3  C) (Tympanic)   Resp 20   SpO2 96%   GENERAL: Comfortable-appearing, alert and no distress  EYES: Eyes grossly normal to inspection, conjunctivae and sclerae normal  Hearing intact.   RESP: normal effort. Tripoding. Speaks in full sentences. Poor air movements, some high pitched expiratory wheezing.   SKIN: no suspicious lesions or rashes on exposed skin  NEURO: Cranial nerves grossly intact, mentation intact and speech normal.  No tremor.   PSYCH: mentation appears normal, affect normal/bright and mood-congruent, judgement and insight intact, normal speech and appearance     Wt Readings from Last 10 Encounters:   06/15/22 104.3 kg (230 lb)   03/04/22 108.1 kg (238 lb 6.4 oz)   01/06/22 99.3 kg (219 lb)   11/05/21 103.4 kg (228 lb)   05/26/21 96.6 kg (213 lb)   12/03/20 100 kg (220 lb 6.4 oz)   11/19/20 99.3 kg (219 lb)   12/18/19 100.2 kg (221 lb)   09/25/19 100.2 kg (221 lb)   07/19/19 102.4 kg (225 lb 11.2 oz)       Data Reviewed:  LABS:   Recent Labs   Lab Test 05/31/19  1113 01/12/15  1056     --    POTASSIUM 3.8  --    CHLORIDE 107  --    CO2 24  --    ANIONGAP 8  --    * 103*   BUN 11  --    CR 0.91  --    PIETRO 9.7  --        IMAGING:     CT chest 9/1/21    Findings:      Lungs: Numerous bilateral calcified granulomas. Stable 9 mm subpleural  nodule along the right minor fissure, most likely lymph node (series 4  image 137). No suspicious pulmonary nodule. Stable extent of  subpleural reticular opacities and basilar predominant honeycombing  with multifocal traction bronchiectasis. No acute focal consolidation.  No significant air trapping on expiratory images  Airways: Central tracheobronchial tree is " clear.  Vessels: Main pulmonary artery and aorta are normal in caliber.  Heart: Heart size is normal without pericardial effusion. Mild  coronary calcifications.  Lymph nodes: . Stable prominent mediastinal lymph nodes. No suspicious  lymphadenopathy.  Thyroid: Within normal limits.  Esophagus: Within normal limits     Upper abdomen: Within normal limits.     Bones and soft tissues: No suspicious axillary lymphadenopathy or soft  tissue mass. No suspicious osseous lesion.                                                                      Impression: Stable ATS alternative diagnosis to UIP type interstitial  lung disease, possibly DIP or fibrotic NSIP. No evidence of acute  exacerbation.      Impressions, Recommendations & Counseling:  Palliative Performance Score:  60      Decision Making Capacity:  present    Wilbert Hutchins is a 62 year old with ILD and undifferentiated connective tissue disease on 6 LPM O2 who is not a transplant candidate who presents  (previously on 4L) O2. Not interested in lung transplant; smokes tobacco and cannabis daily.     1) Chronic dyspnea  -Using 6L NC consistently with limited function. He thinks that low dose morphine ER 15mg BID might be helping but he's not sure. He's not interested in making any changes right now since his health is stable.  PDMP checked. Last urine tox had hydromorphone (<5%), which is a metabolite of morphine, so no concerns for misuse.  Okay for refill.     2) Cough  -Likely secondary to disease progression as well as ongoing smoking cigarettes and cannabis. Patient looking forward to starting steroids + nebulizers prescribed by pulmonary to help improve symptoms. The patient is already on opioids and gabapentin which are two good agents to treat cough. In the future, could consider increasing doses if symptoms become intolerable.  I also provided education about the danger of smoking while using oxygen (patient takes off his O2 while smoking).     3)  "Care Planning  -We discussed the patient's hope to be able to spend some time up in [facility]. Now that the patient's dog , he feels like he has the freedom to go up for an extended period of time. Otherwise, he reports no other major \"unfinished business.\" He worries about the financial cost of his own burial for his partner, Eve.   -Discussed the benefits of hospice to provide in-home care. He believes Eve is capable of caring for him in his home if his function were to decline.     Jose F Woodruff MD   Palliative Medicine Fellow   Staffed with Dr. Linares    Follow up in 3 months     Patient seen and evaluated with Dr. Woodruff.    Agree with assessment and recommendations as documented in this note.     Lenore Linares MD  Palliative Medicine  Pager [phone]        "

## 2022-06-16 LAB
DLCOUNC-%PRED-PRE: 43 %
DLCOUNC-PRE: 11.89 ML/MIN/MMHG
DLCOUNC-PRED: 27.63 ML/MIN/MMHG
ERV-%PRED-PRE: 128 %
ERV-PRE: 1.17 L
ERV-PRED: 0.91 L
EXPTIME-PRE: 7.19 SEC
FEF2575-%PRED-PRE: 61 %
FEF2575-PRE: 1.83 L/SEC
FEF2575-PRED: 2.99 L/SEC
FEFMAX-%PRED-PRE: 63 %
FEFMAX-PRE: 5.89 L/SEC
FEFMAX-PRED: 9.22 L/SEC
FEV1-%PRED-PRE: 63 %
FEV1-PRE: 2.27 L
FEV1FEV6-PRE: 76 %
FEV1FEV6-PRED: 79 %
FEV1FVC-PRE: 76 %
FEV1FVC-PRED: 77 %
FEV1SVC-PRE: 74 %
FEV1SVC-PRED: 72 %
FIFMAX-PRE: 6.04 L/SEC
FRCPLETH-%PRED-PRE: 82 %
FRCPLETH-PRE: 2.97 L
FRCPLETH-PRED: 3.61 L
FVC-%PRED-PRE: 64 %
FVC-PRE: 2.99 L
FVC-PRED: 4.64 L
IC-%PRED-PRE: 46 %
IC-PRE: 1.91 L
IC-PRED: 4.08 L
RVPLETH-%PRED-PRE: 74 %
RVPLETH-PRE: 1.8 L
RVPLETH-PRED: 2.42 L
TLCPLETH-%PRED-PRE: 68 %
TLCPLETH-PRE: 4.88 L
TLCPLETH-PRED: 7.13 L
VA-%PRED-PRE: 62 %
VA-PRE: 4.07 L
VC-%PRED-PRE: 61 %
VC-PRE: 3.08 L
VC-PRED: 4.99 L

## 2022-07-22 ENCOUNTER — PATIENT OUTREACH (OUTPATIENT)
Dept: PALLIATIVE CARE | Facility: CLINIC | Age: 62
End: 2022-07-22

## 2022-07-22 DIAGNOSIS — R06.02 SHORTNESS OF BREATH: ICD-10-CM

## 2022-07-22 DIAGNOSIS — J84.9 ILD (INTERSTITIAL LUNG DISEASE) (H): ICD-10-CM

## 2022-07-22 RX ORDER — MORPHINE SULFATE 15 MG/1
15 TABLET, FILM COATED, EXTENDED RELEASE ORAL EVERY 12 HOURS
Qty: 60 TABLET | Refills: 0 | Status: SHIPPED | OUTPATIENT
Start: 2022-07-22 | End: 2022-08-22

## 2022-07-22 NOTE — PROGRESS NOTES
[facility]: Palliative Care                                                                                        Received voicemail from patient requesting refill of MS Contin.     Last refill: 6/15/2022 per Epic, 6/24/2022 per MN   Last office visit: 6/15/2022  Scheduled for follow up 9/23/2022     Will route request to MD for review.     Reviewed MN  Report.       Signature:  Latisha Melgar, LALITN, RN, OCN

## 2022-08-22 ENCOUNTER — PATIENT OUTREACH (OUTPATIENT)
Dept: PALLIATIVE CARE | Facility: CLINIC | Age: 62
End: 2022-08-22

## 2022-08-22 DIAGNOSIS — J84.9 ILD (INTERSTITIAL LUNG DISEASE) (H): ICD-10-CM

## 2022-08-22 DIAGNOSIS — R06.02 SHORTNESS OF BREATH: ICD-10-CM

## 2022-08-22 RX ORDER — MORPHINE SULFATE 15 MG/1
15 TABLET, FILM COATED, EXTENDED RELEASE ORAL EVERY 12 HOURS
Qty: 60 TABLET | Refills: 0 | Status: SHIPPED | OUTPATIENT
Start: 2022-08-22 | End: 2022-09-20

## 2022-08-22 NOTE — PROGRESS NOTES
[facility]: Palliative Care                                                                                        Received voicemail from patient requesting refill of MS Contin.     Last refill: 7/22/2022  Last office visit: 6/15/2022  Scheduled for follow up 9/23/2022    Will route request to MD for review.     Reviewed MN  Report.       Signature:  LALIT FayN, RN, OCN

## 2022-09-08 ENCOUNTER — DOCUMENTATION ONLY (OUTPATIENT)
Dept: PULMONOLOGY | Facility: CLINIC | Age: 62
End: 2022-09-08

## 2022-09-08 ENCOUNTER — CARE COORDINATION (OUTPATIENT)
Dept: PULMONOLOGY | Facility: CLINIC | Age: 62
End: 2022-09-08

## 2022-09-08 DIAGNOSIS — M05.10 RHEUMATOID LUNG (H): Primary | ICD-10-CM

## 2022-09-08 NOTE — PROGRESS NOTES
I discussed with Dr. Hudson (Outside rheumatology) over the phone regarding the addition of RTX that he suggested. Patient has active arthralgias and doesn't seem to be responding to prednisone 10, HCQN, and cellcept for several years now. From a pulmonary standpoint, most recent CT chest was from 9/2021 and at the time, he did have GGO with overlapping fibrosis. We will arrange for repeat HRCT chest to follow up on these findings and I do agree that he will most likely benefit from RTX therapy.

## 2022-09-09 ENCOUNTER — TELEPHONE (OUTPATIENT)
Dept: PULMONOLOGY | Facility: CLINIC | Age: 62
End: 2022-09-09

## 2022-09-09 NOTE — TELEPHONE ENCOUNTER
Spoke with patient; agreeable to completing chest CT at soonest availability. Order placed by provider; will request Clinic Coordinator contact for scheduling.     Alana Saenz RN, BSN  ILD Nurse Care Coordinator  (P) [phone]

## 2022-09-09 NOTE — TELEPHONE ENCOUNTER
----- Message from binu Suárez MD sent at 9/8/2022  4:37 PM CDT -----  I just spoke with the patient's rheumatologist, he wants to start RTX for active RA refractory to current immunosuppression. Patient did not have a scan in almost a year so we will need to re-scan him and confirm presence of GGO to further support an argument for RTX therapy. The order has been placed. Can you kindly arranged CT appointment?      AK

## 2022-09-15 ENCOUNTER — ANCILLARY PROCEDURE (OUTPATIENT)
Dept: CT IMAGING | Facility: CLINIC | Age: 62
End: 2022-09-15
Attending: INTERNAL MEDICINE
Payer: COMMERCIAL

## 2022-09-15 DIAGNOSIS — M05.10 RHEUMATOID LUNG (H): ICD-10-CM

## 2022-09-15 PROCEDURE — 71250 CT THORAX DX C-: CPT | Mod: GC | Performed by: RADIOLOGY

## 2022-09-19 NOTE — TELEPHONE ENCOUNTER
RE: Got CT  Received: 3 days ago  Matt uSárez MD Kershner, Sophia, RN  Cc: P Interstitial Lung Disease [facility] Nurses-Marika; Taj Vail  We don't need to review the scan as I have texted his rheumatologist today to move forward with RTX. I haven't heard back from him yet. The scan is not very different from prior.       AK             Previous Messages       ----- Message -----   From: Alana Saenz, RN   Sent: 9/16/2022   1:47 PM CDT   To: Matt Suárez MD, Taj Vail, *   Subject: FW: Got CT                                       Matt,     Do you want to review this scan on Monday in conference?     I believe you wanted to get this scan after speaking with the patient's rheumatologist- please let us know if you would like us to call the patient at any point with an update, etc or perhaps you plan to connect with the rheumatologist?     Thank you in advance for your reply silvio,     Alana

## 2022-09-20 ENCOUNTER — PATIENT OUTREACH (OUTPATIENT)
Dept: PALLIATIVE CARE | Facility: CLINIC | Age: 62
End: 2022-09-20

## 2022-09-20 DIAGNOSIS — J84.9 ILD (INTERSTITIAL LUNG DISEASE) (H): ICD-10-CM

## 2022-09-20 DIAGNOSIS — R06.02 SHORTNESS OF BREATH: ICD-10-CM

## 2022-09-20 RX ORDER — MORPHINE SULFATE 15 MG/1
15 TABLET, FILM COATED, EXTENDED RELEASE ORAL EVERY 12 HOURS
Qty: 60 TABLET | Refills: 0 | Status: SHIPPED | OUTPATIENT
Start: 2022-09-20 | End: 2022-10-21

## 2022-09-23 ENCOUNTER — OFFICE VISIT (OUTPATIENT)
Dept: PALLIATIVE CARE | Facility: CLINIC | Age: 62
End: 2022-09-23
Attending: INTERNAL MEDICINE
Payer: COMMERCIAL

## 2022-09-23 VITALS
OXYGEN SATURATION: 98 % | DIASTOLIC BLOOD PRESSURE: 75 MMHG | SYSTOLIC BLOOD PRESSURE: 124 MMHG | TEMPERATURE: 98.6 F | BODY MASS INDEX: 34.29 KG/M2 | WEIGHT: 239 LBS | HEART RATE: 67 BPM

## 2022-09-23 DIAGNOSIS — J84.9 ILD (INTERSTITIAL LUNG DISEASE) (H): Primary | ICD-10-CM

## 2022-09-23 DIAGNOSIS — R06.02 SHORTNESS OF BREATH: ICD-10-CM

## 2022-09-23 PROCEDURE — 99215 OFFICE O/P EST HI 40 MIN: CPT | Performed by: INTERNAL MEDICINE

## 2022-09-23 PROCEDURE — G0463 HOSPITAL OUTPT CLINIC VISIT: HCPCS

## 2022-09-23 ASSESSMENT — PAIN SCALES - GENERAL: PAINLEVEL: NO PAIN (0)

## 2022-09-23 NOTE — LETTER
"9/23/2022       RE: Wilbert Hutchins  [address]  [facility] [address]     Dear Colleague,    Thank you for referring your patient, Wilbert Hutchins, to the [facility]. Please see a copy of my visit note below.    Palliative Care Outpatient Clinic      Patient ID:  Medical - He has ILD + undifferentiated connective tissue disease on 6 lpm (previously on 4L) O2. Not interested in lung transplant; smokes tobacco and cannabis daily.      We have been following for dyspnea, Dr Oliveira started morphine-->morphineER 15mg bid Summer 2021.     Social - Lives with wife Eve. 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  Cannabis and tobacco use disorders: continues to smoke despite health consequences & ineligiblity for lung transplantation; considers his smoking as integral to his qol and is not interested in stopping. Estranged relationships with children \"their choice, no mine.\"     Care Planning - +HCD on file, names Myra as his agent. DNR/DNI POLST completed 11/2021. Hospice education completed (he is not eligible currently)     Opioid Safety - +Naloxone   UDS 3/2022 as expected    History:      Chart review including outside records.  Saw rheum at : continue HCQ  Garry tells me he is now being told he has RA and I think possibly rituxumab is being discussed for his ILD. Reviewing the chart previously his disease was more described as an undifferentiated CTD, and I'm not exactly clear what has changed, although this is well outside my area of expertise. He has had a high RF and CCP since at least back to 2019. He just had a CT which showed grossly stable bilateral fibrotic changes.     He overall is not really sure what this all means but feels buoyed by it; it's given him hope his lung diseae can be controlled longer and he'll live longer. He has follow-up " with his rheumatologist at  in a week or 2.    His dyspnea and debility remain stable. He spends a lot of his day smoking outdoors playing social video games with a friend network online which is very meaningful for him. No changes to morphine use, side effects, cognitive effects, or constipation from the morphine      PE: There were no vitals taken for this visit.   Wt Readings from Last 3 Encounters:   06/15/22 104.3 kg (230 lb)   03/04/22 108.1 kg (238 lb 6.4 oz)   01/06/22 99.3 kg (219 lb)     Alert NAD  Clear sensorium full affect  In an electric WC      Data reviewed:  I reviewed recent labs and imaging, my comments:  labs: Cr 0.8, CO2 26  CT  IMPRESSION:  Stable fibrotic changes in the lungs consistent with  interstitial lung disease alternative diagnosis to UIP per ATS  criteria. Possibly CPFE or sequela of DIP. No acute airspace  consolidation to suggest active infection.     database reviewed: Y      Impression & Recommendations:  61 yo with severe ILD and CTD  On chronic morphine for dyspnea at 15 mg bid; no changes recommended today; no clear side effects or safety concerns with it.  Discussed mood and coping at length.  Sounds like he's being evaluated for additional potentially disease modifying therapies for his ILD and he's excited by that possibility.     Follow-up 2-3mo    44 minutes spent on the date of the encounter doing chart review, history and exam, patient education & counseling, documentation and other activities as noted above.    Thank you for involving us in the patient's care.     Jostin Peralta MD / Palliative Medicine / Text me via [facility].

## 2022-09-23 NOTE — NURSING NOTE
"Oncology Rooming Note    September 23, 2022 2:30 PM   Wilbert Hutchins is a 62 year old male who presents for:    Chief Complaint   Patient presents with     Oncology Clinic Visit     ILD (interstitial lung disease) (H)     Initial Vitals: /75 (BP Location: Right arm, Patient Position: Sitting, Cuff Size: Adult Large)   Pulse 67   Temp 98.6  F (37  C) (Oral)   Wt 108.4 kg (239 lb)   SpO2 98%   BMI 34.29 kg/m   Estimated body mass index is 34.29 kg/m  as calculated from the following:    Height as of 1/6/22: 1.778 m (5' 10\").    Weight as of this encounter: 108.4 kg (239 lb). Body surface area is 2.31 meters squared.  No Pain (0) Comment: Data Unavailable   No LMP for male patient.  Allergies reviewed: Yes  Medications reviewed: Yes    Medications: Medication refills not needed today.  Pharmacy name entered into [employer]: [facility] [address] [place] [address]    Clinical concerns: None    Kareem Ching            "

## 2022-10-10 ENCOUNTER — HEALTH MAINTENANCE LETTER (OUTPATIENT)
Age: 62
End: 2022-10-10

## 2022-10-21 ENCOUNTER — PATIENT OUTREACH (OUTPATIENT)
Dept: PALLIATIVE CARE | Facility: CLINIC | Age: 62
End: 2022-10-21

## 2022-10-21 DIAGNOSIS — J84.9 ILD (INTERSTITIAL LUNG DISEASE) (H): ICD-10-CM

## 2022-10-21 DIAGNOSIS — R06.02 SHORTNESS OF BREATH: ICD-10-CM

## 2022-10-21 RX ORDER — MORPHINE SULFATE 15 MG/1
15 TABLET, FILM COATED, EXTENDED RELEASE ORAL EVERY 12 HOURS
Qty: 60 TABLET | Refills: 0 | Status: SHIPPED | OUTPATIENT
Start: 2022-10-21 | End: 2022-11-23

## 2022-10-21 NOTE — PROGRESS NOTES
[facility]: Palliative Care                                                                                        Received voicemail from patient requesting refill of morphine.     Last refill: 9/20/2022  Last office visit: 9/23/2022  Message to  for 2-3 month RTN    Will route request to MD for review.     Reviewed MN  Report.       Signature:  LALIT FayN, RN, OCN

## 2022-11-18 ENCOUNTER — DOCUMENTATION ONLY (OUTPATIENT)
Dept: MULTI SPECIALTY CLINIC | Facility: CLINIC | Age: 62
End: 2022-11-18

## 2022-11-18 NOTE — PROGRESS NOTES
Discussed with Dr. Hudson ([place] rheumatologist] in regards to updated CT scan from September 15,022.  The question was whether the patient would benefit from rituximab for his rheumatoid ILD.  I expressed that this is felt unlikely as his disease is mainly fibrotic at this point, and his diffusion capacity did not change significantly since 2020.  Ground glass opacities are very faint and not favored to be active inflammation.  We will consider antifibrotic treatment in the next visit

## 2022-11-23 ENCOUNTER — PATIENT OUTREACH (OUTPATIENT)
Dept: PALLIATIVE CARE | Facility: CLINIC | Age: 62
End: 2022-11-23

## 2022-11-23 DIAGNOSIS — J84.9 ILD (INTERSTITIAL LUNG DISEASE) (H): ICD-10-CM

## 2022-11-23 DIAGNOSIS — R06.02 SHORTNESS OF BREATH: ICD-10-CM

## 2022-11-23 RX ORDER — MORPHINE SULFATE 15 MG/1
15 TABLET, FILM COATED, EXTENDED RELEASE ORAL EVERY 12 HOURS
Qty: 60 TABLET | Refills: 0 | Status: SHIPPED | OUTPATIENT
Start: 2022-11-23 | End: 2022-12-19

## 2022-11-23 NOTE — PROGRESS NOTES
[facility]: Palliative Care                                                                                        Received voicemail from patient requesting refill of morphine.     Last refill: 10/21/2022  Last office visit: 9/23/2022  Scheduled for follow up 12/6/2022    Will route request to MD for review.     Reviewed [place]  Report.       Signature:  LALIT FayN, RN, OCN

## 2022-12-06 ENCOUNTER — OFFICE VISIT (OUTPATIENT)
Dept: PALLIATIVE CARE | Facility: CLINIC | Age: 62
End: 2022-12-06
Attending: INTERNAL MEDICINE
Payer: COMMERCIAL

## 2022-12-06 VITALS
OXYGEN SATURATION: 93 % | HEART RATE: 66 BPM | RESPIRATION RATE: 20 BRPM | SYSTOLIC BLOOD PRESSURE: 123 MMHG | TEMPERATURE: 98.7 F | DIASTOLIC BLOOD PRESSURE: 66 MMHG

## 2022-12-06 DIAGNOSIS — J84.9 ILD (INTERSTITIAL LUNG DISEASE) (H): Primary | ICD-10-CM

## 2022-12-06 DIAGNOSIS — R06.02 SHORTNESS OF BREATH: ICD-10-CM

## 2022-12-06 PROCEDURE — G0463 HOSPITAL OUTPT CLINIC VISIT: HCPCS

## 2022-12-06 PROCEDURE — 99215 OFFICE O/P EST HI 40 MIN: CPT | Performed by: INTERNAL MEDICINE

## 2022-12-06 ASSESSMENT — PAIN SCALES - GENERAL: PAINLEVEL: MILD PAIN (2)

## 2022-12-06 NOTE — NURSING NOTE
"Oncology Rooming Note    December 6, 2022 2:32 PM   Wilbert Hutchins is a 62 year old male who presents for:    Chief Complaint   Patient presents with     Oncology Clinic Visit     RTN for Palliative [facility]     Initial Vitals: Blood Pressure 123/66   Pulse 66   Temperature 98.7  F (37.1  C) (Oral)   Respiration 20   Oxygen Saturation 93%  Estimated body mass index is 34.29 kg/m  as calculated from the following:    Height as of 1/6/22: 1.778 m (5' 10\").    Weight as of 9/23/22: 108.4 kg (239 lb). There is no height or weight on file to calculate BSA.  Mild Pain (2) Comment: Data Unavailable   No LMP for male patient.  Allergies reviewed: Yes  Medications reviewed: Yes    Medications: Medication refills not needed today.  Pharmacy name entered into [employer]: [facility] [address] [place] [address]    Clinical concerns: none       Nell Nunez MA            "

## 2022-12-06 NOTE — PROGRESS NOTES
"Palliative Care Outpatient Clinic      Patient ID:  Medical - He has ILD + undifferentiated connective tissue disease on 6 lpm (previously on 4L) O2. Not interested in lung transplant; smokes tobacco and cannabis daily.      We have been following for dyspnea, Dr Oliveira started morphine-->morphineER 15mg bid Summer 2021.     Social - Lives with wife Eve. 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  Cannabis and tobacco use disorders: continues to smoke despite health consequences & ineligiblity for lung transplantation; considers his smoking as integral to his qol and is not interested in stopping. Estranged relationships with children \"their choice, not mine.\"   Has homecare nursing and case management via [employer] program via [employer]      Care Planning - +HCD on file, names Myra as his agent. DNR/DNI POLST completed 11/2021. Hospice education completed (he is not eligible currently)     Opioid Safety - +Naloxone   UDS 3/2022 as expected      History:  History gathered today from: patient, medical chart, outside records including Care Everywhere    Clinically he feels he continues to do a little worse--more tired, more dyspnea  Smoking less (tobacco and cannabis) in part because of the effort to get to his garage to smoke; also more coughing with cannabis. Cannabis stops his mind from racing though and he finds it very helpful.  More frustrated.     Ran out of morphine for 2 days: felt resps worsen and function (mobility) worsened off of it.  No constipation, cognitive side effects he experiences    Sees his pulmonologist tomorrow. I think his rheumatologist suggested he may have RA ILD and wondered if a biologic would be helpful and Garry is waiting to see if his pulmonologist would weigh in on this.     PE: There were no vitals taken for this visit.   Wt Readings from Last 3 Encounters:   09/23/22 108.4 kg (239 lb)   06/15/22 104.3 kg (230 lb)   03/04/22 108.1 kg " (238 lb 6.4 oz)     Alert NAD  Clear sensorium full affect  Wearing O2      Data reviewed:  I reviewed recent labs and imaging, my comments:  CT  Sept  IMPRESSION:  Stable fibrotic changes in the lungs consistent with  interstitial lung disease alternative diagnosis to UIP per ATS  criteria. Possibly CPFE or sequela of DIP. No acute airspace  consolidation to suggest active infection.     database reviewed: y      Impression & Recommendations:    61 yo with ILD and chronic dyspnea, on chronic opioid therapy for that.  No changes rec'd for dyspnea, morphine management  Glad he's seeing his pulmonologist tomorrow.  No add'l safety concerns I identified today,.  Discussed mood and coping  Follow-up 3 mo    42 minutes spent on the date of the encounter doing chart review, history and exam, patient education & counseling, documentation and other activities as noted above.    Thank you for involving us in the patient's care.   Jostin Peralta MD / Palliative Medicine / Text me via [facility].

## 2022-12-06 NOTE — LETTER
"12/6/2022       RE: Wilbert Hutchins  [address]  [facility] [address]     Dear Colleague,    Thank you for referring your patient, Wilbert Hutchins, to the [facility] at [facility]. Please see a copy of my visit note below.    Palliative Care Outpatient Clinic      Patient ID:  Medical - He has ILD + undifferentiated connective tissue disease on 6 lpm (previously on 4L) O2. Not interested in lung transplant; smokes tobacco and cannabis daily.      We have been following for dyspnea, Dr Oliveira started morphine-->morphineER 15mg bid Summer 2021.     Social - Lives with wife Eve. 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  Cannabis and tobacco use disorders: continues to smoke despite health consequences & ineligiblity for lung transplantation; considers his smoking as integral to his qol and is not interested in stopping. Estranged relationships with children \"their choice, not mine.\"   Has homecare nursing and case management via [employer] program via [employer].      Care Planning - +HCD on file, names Myra as his agent. DNR/DNI POLST completed 11/2021. Hospice education completed (he is not eligible currently)     Opioid Safety - +Naloxone   UDS 3/2022 as expected      History:  History gathered today from: patient, medical chart, outside records including Care Everywhere    Clinically he feels he continues to do a little worse--more tired, more dyspnea  Smoking less (tobacco and cannabis) in part because of the effort to get to his garage to smoke; also more coughing with cannabis. Cannabis stops his mind from racing though and he finds it very helpful.  More frustrated.     Ran out of morphine for 2 days: felt resps worsen and function (mobility) worsened off of it.  No constipation, cognitive side effects he experiences    Sees his pulmonologist tomorrow. I think his " rheumatologist suggested he may have RA ILD and wondered if a biologic would be helpful and Garry is waiting to see if his pulmonologist would weigh in on this.     PE: There were no vitals taken for this visit.   Wt Readings from Last 3 Encounters:   09/23/22 108.4 kg (239 lb)   06/15/22 104.3 kg (230 lb)   03/04/22 108.1 kg (238 lb 6.4 oz)     Alert NAD  Clear sensorium full affect  Wearing O2      Data reviewed:  I reviewed recent labs and imaging, my comments:  CT  Sept  IMPRESSION:  Stable fibrotic changes in the lungs consistent with  interstitial lung disease alternative diagnosis to UIP per ATS  criteria. Possibly CPFE or sequela of DIP. No acute airspace  consolidation to suggest active infection.     database reviewed: y      Impression & Recommendations:    61 yo with ILD and chronic dyspnea, on chronic opioid therapy for that.  No changes rec'd for dyspnea, morphine management  Glad he's seeing his pulmonologist tomorrow.  No add'l safety concerns I identified today,.  Discussed mood and coping  Follow-up 3 mo    42 minutes spent on the date of the encounter doing chart review, history and exam, patient education & counseling, documentation and other activities as noted above.    Thank you for involving us in the patient's care.     Jostin Peralta MD / Palliative Medicine / Text me via [facility].

## 2022-12-07 ENCOUNTER — OFFICE VISIT (OUTPATIENT)
Dept: PULMONOLOGY | Facility: CLINIC | Age: 62
End: 2022-12-07
Attending: INTERNAL MEDICINE
Payer: COMMERCIAL

## 2022-12-07 VITALS
WEIGHT: 233 LBS | SYSTOLIC BLOOD PRESSURE: 136 MMHG | HEART RATE: 54 BPM | OXYGEN SATURATION: 98 % | HEIGHT: 70 IN | BODY MASS INDEX: 33.36 KG/M2 | DIASTOLIC BLOOD PRESSURE: 82 MMHG

## 2022-12-07 DIAGNOSIS — J44.9 COPD, GROUP D, BY GOLD 2017 CLASSIFICATION (H): ICD-10-CM

## 2022-12-07 DIAGNOSIS — J41.1 MUCOPURULENT CHRONIC BRONCHITIS (H): ICD-10-CM

## 2022-12-07 DIAGNOSIS — D84.9 IMMUNOSUPPRESSION (H): Primary | ICD-10-CM

## 2022-12-07 DIAGNOSIS — J84.9 ILD (INTERSTITIAL LUNG DISEASE) (H): ICD-10-CM

## 2022-12-07 DIAGNOSIS — M05.10 RHEUMATOID LUNG (H): ICD-10-CM

## 2022-12-07 PROCEDURE — 99214 OFFICE O/P EST MOD 30 MIN: CPT | Mod: 25 | Performed by: INTERNAL MEDICINE

## 2022-12-07 PROCEDURE — 94375 RESPIRATORY FLOW VOLUME LOOP: CPT | Performed by: INTERNAL MEDICINE

## 2022-12-07 PROCEDURE — G0463 HOSPITAL OUTPT CLINIC VISIT: HCPCS | Mod: 25

## 2022-12-07 PROCEDURE — 94729 DIFFUSING CAPACITY: CPT | Performed by: INTERNAL MEDICINE

## 2022-12-07 RX ORDER — UMECLIDINIUM BROMIDE AND VILANTEROL TRIFENATATE 62.5; 25 UG/1; UG/1
1 POWDER RESPIRATORY (INHALATION) DAILY
Qty: 60 EACH | Refills: 4 | Status: SHIPPED | OUTPATIENT
Start: 2022-12-07 | End: 2023-05-09

## 2022-12-07 RX ORDER — ROFLUMILAST 250 UG/1
TABLET ORAL
Qty: 90 TABLET | Refills: 4 | Status: SHIPPED | OUTPATIENT
Start: 2022-12-07 | End: 2023-07-28

## 2022-12-07 NOTE — PROGRESS NOTES
[facility]  ILD Clinic - Return Visit -  December 7, 2022         Assessment and Plan:   Wilbert Hutchins is a 61 year old male with a history of interstitial lung disease who presents for a return visit.     1) Interstitial lung disease  - Indeterminate for UIP in the setting of rheumatoid, DDx include DIP and NSIP  - He continues to smoke cigarettes (down to 9-10 cig/day), he has tried wellbutrin in the past but suffered with side effects. Declined chantix but will ask for it if he is not successful on his own.  - Lung transplantation discussed previously by Dr. Lin, and he understands he will not be a candidate for lung transplantation due to his current smoking.    - RTX has been discussed with rheumatologist Dr. Silva, it is probably worth a trial, he already had a recent negative TB QFT so we don't need to repeat that.   - HIV, Hep C, Hep B screens  - I will re-discuss with Dr. Silav and will initiate RTX   - His PFTs today show some minor improvement compared to prior years with the exception to DLCO.     A) Supplemental oxygen  - Using portable oxygen with compliance, needs up to 6 LPM with activity.     B) Nocturnal oximetry  He is using oxygen at nighttime at 3 LPM.     2) Continued Tobacco Use  -As above, we discussed the importance of smoking cessation.  He will reach out to us if he feels as though he can benefit from chantix but wants to keep trying without pharmacologic aid give success in cutting down number of cigarettes daily by 50%.    3) GOLD D COPD   Overlapping with ILD, but symptoms are consistent with mucopurulent chronic bronchitis. Being on oxygen qualifies for GOLD D.  - Anoro ellipta, 1 inhalation qAM  - No need for ICS given current systemic prednisone  - Daliresp 250 mcg po daily qAM x30 days then 500 mcg po qAM      RTC: 4 months with PFTs   Influenza and other vaccinations: Up to date on Prevnar. He has received the  "Pfizer COVID-19 vaccine, including booster dose.    I spent 35 minutes  total today, reviewing medical records including progress notes, multiple imaging studies from his previous available records, and documentation.      Matt Suárez MD  Pulmonary and Critical Care Medicine          Problem List:     1. Interstitial lung disease  a. HRCT (May 31, 2019) Indeterminate for UIP;   i. Peripheral reticulation, mild paraseptal emphysema, GGO and peripheral reticulation and fibrosis basilar predominant. Mediastinal lymphadenopathy.  No air trapping on expiratory views.  b. Pulm: Dr. Jack Machado ([facility])  c. Rheum: Dr. Silva ([facility])  d. Treatment   i. On Prednisone since March 2018, current dose 10 mg daily   ii. CellCept initiated in 2019 but could not tolerate due to exacerbation of arthralgias  iii. On Plaquinil   iv. Prednisone dose currently at 10mg daily     2. Tobacco and Marijuana use     3. Migrating arthralgias  a. Positive RF (52) and Anti-CCP (76)  b. No erosive arthritis on right hand film (April 6, 2017)     4. Obesity (Body mass index is 32.38 kg/m .)        Interval History:     Wilbert Hutchins is a 61 year old male with interstitial lung disease who presents for a return visit.  He was last seen in by me in 6/2022.    He continues to need oxygen at 6 LPM and very short of breath with minimal activity. Needs albuterol inhaler on daily basis. Also has associated chronic cough that is productive in the morning. No other inhalers are currently prescribed. Denies hemoptysis and fever. RTX is currently being considered between us and rheumatology, he continues to take prednisone 10 and HCQN.           Physical Exam:   /82 (BP Location: Left arm, Patient Position: Sitting, Cuff Size: Adult Large)   Pulse 54   Ht 1.778 m (5' 10\")   Wt 105.7 kg (233 lb)   SpO2 98%   BMI 33.43 kg/m        GENERAL: alert, NAD  HEENT: NCAT, EOMI, masked  Neck: no cervical or " supraclavicular adenopathy  Lungs: decreased air movement, pan-inspiratory crackles more prominent at bases  CV: RRR, S1S2, no murmurs noted  Abdomen: normoactive BS, soft, non tender   Lymph: no edema  Neuro: AAO X 3  Psychiatric: normal affect, good eye contact  Skin: no rash, jaundice or lesions on limited exam  Extremities: No cyanosis, clubbing, trace edema.         Imaging:     HRCT chest images from 9/15/22 reviewed and compared to 9/2021  Agreed with radiology:  IMPRESSION:  Stable fibrotic changes in the lungs consistent with  interstitial lung disease alternative diagnosis to UIP per ATS  criteria. Possibly CPFE or sequela of DIP. No acute airspace  consolidation to suggest active infection.         Pulmonary Function Tests:                  Cardiac:     TTE (11/24/2020)  Interpretation Summary  Global and regional left ventricular function is normal with an EF of 60-65%.  Global right ventricular function is normal.  There is late appearance of bubbles in the left heart on the bubble study. A small patent foramen ovale cannot be excluded.  No significant valvular abnormalities present.  The inferior vena cava was normal in size with preserved respiratory variability.  No pericardial effusion is present.    Transthoracic echocardiogram (July 19, 2019)  Global and regional left ventricular function is normal with an EF of 60-65%.  Right ventricular function, chamber size, wall motion, and thickness are  Normal.   The atrial septum is intact as assessed by color Doppler and agitated saline  bubble study.  Pulmonary artery systolic pressure cannot be assessed.  The inferior vena cava is normal.  No pericardial effusion is present.  *PA acceleration time reported as 130msec (normal to borderline)     Stress echo (March 22, 2017)  1. No ischemia at the cardiac workload achieved.  2. Left ventricular function normal at rest, improved with stress.  3. No ischemic ECG response with adequate heart rate.  4. The  patient did not report angina with stress.  5. Exercise capacity was good .  6. The baseline echocardiogram revealed no significant valvular  abnormalities. There is focal non specific change of the aortic valve.  7. Normal blood pressure response with exercise.         Other:     Answers for HPI/ROS submitted by the patient on 11/30/2022  General Symptoms: No  Skin Symptoms: No  HENT Symptoms: No  EYE SYMPTOMS: No  HEART SYMPTOMS: No  LUNG SYMPTOMS: No  INTESTINAL SYMPTOMS: No  URINARY SYMPTOMS: No  REPRODUCTIVE SYMPTOMS: No  SKELETAL SYMPTOMS: No  BLOOD SYMPTOMS: No  NERVOUS SYSTEM SYMPTOMS: No  MENTAL HEALTH SYMPTOMS: No

## 2022-12-07 NOTE — LETTER
12/7/2022         RE: Wilbert Hutchins  [address]  [facility] [address]        Dear Colleague,    Thank you for referring your patient, Wilbert Hutchins, to the [facility] [place]. Please see a copy of my visit note below.    [facility]  ILD Clinic - Return Visit -  December 7, 2022         Assessment and Plan:   Wilbert Hutchins is a 61 year old male with a history of interstitial lung disease who presents for a return visit.     1) Interstitial lung disease  - Indeterminate for UIP in the setting of rheumatoid, DDx include DIP and NSIP  - He continues to smoke cigarettes (down to 9-10 cig/day), he has tried wellbutrin in the past but suffered with side effects. Declined chantix but will ask for it if he is not successful on his own.  - Lung transplantation discussed previously by Dr. Lin, and he understands he will not be a candidate for lung transplantation due to his current smoking.    - RTX has been discussed with rheumatologist Dr. Silva, it is probably worth a trial, he already had a recent negative TB QFT so we don't need to repeat that.   - HIV, Hep C, Hep B screens  - I will re-discuss with Dr. Silva and will initiate RTX   - His PFTs today show some minor improvement compared to prior years with the exception to DLCO.     A) Supplemental oxygen  - Using portable oxygen with compliance, needs up to 6 LPM with activity.     B) Nocturnal oximetry  He is using oxygen at nighttime at 3 LPM.     2) Continued Tobacco Use  -As above, we discussed the importance of smoking cessation.  He will reach out to us if he feels as though he can benefit from chantix but wants to keep trying without pharmacologic aid give success in cutting down number of cigarettes daily by 50%.    3) GOLD D COPD   Overlapping with ILD, but symptoms are consistent with mucopurulent chronic  bronchitis. Being on oxygen qualifies for GOLD D.  - Anoro ellipta, 1 inhalation qAM  - No need for ICS given current systemic prednisone  - Daliresp 250 mcg po daily qAM x30 days then 500 mcg po qAM      RTC: 4 months with PFTs   Influenza and other vaccinations: Up to date on Prevnar. He has received the Pfizer COVID-19 vaccine, including booster dose.    I spent 35 minutes  total today, reviewing medical records including progress notes, multiple imaging studies from his previous available records, and documentation.      Matt Suárez MD  Pulmonary and Critical Care Medicine          Problem List:     1. Interstitial lung disease  a. HRCT (May 31, 2019) Indeterminate for UIP;   i. Peripheral reticulation, mild paraseptal emphysema, GGO and peripheral reticulation and fibrosis basilar predominant. Mediastinal lymphadenopathy.  No air trapping on expiratory views.  b. Pulm: Dr. Jack Machado ([facility])  c. Rheum: Dr. Silva ([facility])  d. Treatment   i. On Prednisone since March 2018, current dose 10 mg daily   ii. CellCept initiated in 2019 but could not tolerate due to exacerbation of arthralgias  iii. On Plaquinil   iv. Prednisone dose currently at 10mg daily     2. Tobacco and Marijuana use     3. Migrating arthralgias  a. Positive RF (52) and Anti-CCP (76)  b. No erosive arthritis on right hand film (April 6, 2017)     4. Obesity (Body mass index is 32.38 kg/m .)        Interval History:     Wilbert Hutchins is a 61 year old male with interstitial lung disease who presents for a return visit.  He was last seen in by me in 6/2022.    He continues to need oxygen at 6 LPM and very short of breath with minimal activity. Needs albuterol inhaler on daily basis. Also has associated chronic cough that is productive in the morning. No other inhalers are currently prescribed. Denies hemoptysis and fever. RTX is currently being considered between us and rheumatology, he continues to take  "prednisone 10 and HCQN.           Physical Exam:   /82 (BP Location: Left arm, Patient Position: Sitting, Cuff Size: Adult Large)   Pulse 54   Ht 1.778 m (5' 10\")   Wt 105.7 kg (233 lb)   SpO2 98%   BMI 33.43 kg/m        GENERAL: alert, NAD  HEENT: NCAT, EOMI, masked  Neck: no cervical or supraclavicular adenopathy  Lungs: decreased air movement, pan-inspiratory crackles more prominent at bases  CV: RRR, S1S2, no murmurs noted  Abdomen: normoactive BS, soft, non tender   Lymph: no edema  Neuro: AAO X 3  Psychiatric: normal affect, good eye contact  Skin: no rash, jaundice or lesions on limited exam  Extremities: No cyanosis, clubbing, trace edema.         Imaging:     HRCT chest images from 9/15/22 reviewed and compared to 9/2021  Agreed with radiology:  IMPRESSION:  Stable fibrotic changes in the lungs consistent with  interstitial lung disease alternative diagnosis to UIP per ATS  criteria. Possibly CPFE or sequela of DIP. No acute airspace  consolidation to suggest active infection.         Pulmonary Function Tests:                  Cardiac:     TTE (11/24/2020)  Interpretation Summary  Global and regional left ventricular function is normal with an EF of 60-65%.  Global right ventricular function is normal.  There is late appearance of bubbles in the left heart on the bubble study. A small patent foramen ovale cannot be excluded.  No significant valvular abnormalities present.  The inferior vena cava was normal in size with preserved respiratory variability.  No pericardial effusion is present.    Transthoracic echocardiogram (July 19, 2019)  Global and regional left ventricular function is normal with an EF of 60-65%.  Right ventricular function, chamber size, wall motion, and thickness are  Normal.   The atrial septum is intact as assessed by color Doppler and agitated saline  bubble study.  Pulmonary artery systolic pressure cannot be assessed.  The inferior vena cava is normal.  No pericardial " effusion is present.  *PA acceleration time reported as 130msec (normal to borderline)     Stress echo (March 22, 2017)  1. No ischemia at the cardiac workload achieved.  2. Left ventricular function normal at rest, improved with stress.  3. No ischemic ECG response with adequate heart rate.  4. The patient did not report angina with stress.  5. Exercise capacity was good .  6. The baseline echocardiogram revealed no significant valvular  abnormalities. There is focal non specific change of the aortic valve.  7. Normal blood pressure response with exercise.         Other:       Again, thank you for allowing me to participate in the care of your patient.        Sincerely,        Matt Suárez MD

## 2022-12-07 NOTE — PATIENT INSTRUCTIONS
Please call our direct ILD nurses line for questions and concerns: [phone]    For scheduling, please call: [phone]

## 2022-12-07 NOTE — NURSING NOTE
Chief Complaint   Patient presents with     Interstitial Lung Disease (ILD)     ILD Follow up      Vitals were taken and medications were reconciled.     Pam MONTALVOA  11:13 AM

## 2022-12-09 LAB
DLCOUNC-%PRED-PRE: 35 %
DLCOUNC-PRE: 9.91 ML/MIN/MMHG
DLCOUNC-PRED: 27.63 ML/MIN/MMHG
ERV-%PRED-PRE: 83 %
ERV-PRE: 0.74 L
ERV-PRED: 0.89 L
EXPTIME-PRE: 6.65 SEC
FEF2575-%PRED-PRE: 75 %
FEF2575-PRE: 2.26 L/SEC
FEF2575-PRED: 2.99 L/SEC
FEFMAX-%PRED-PRE: 86 %
FEFMAX-PRE: 8.02 L/SEC
FEFMAX-PRED: 9.22 L/SEC
FEV1-%PRED-PRE: 70 %
FEV1-PRE: 2.53 L
FEV1FEV6-PRE: 80 %
FEV1FEV6-PRED: 79 %
FEV1FVC-PRE: 80 %
FEV1FVC-PRED: 77 %
FEV1SVC-PRE: 82 %
FEV1SVC-PRED: 72 %
FIFMAX-PRE: 6.33 L/SEC
FVC-%PRED-PRE: 68 %
FVC-PRE: 3.16 L
FVC-PRED: 4.64 L
IC-%PRED-PRE: 56 %
IC-PRE: 2.34 L
IC-PRED: 4.1 L
VA-%PRED-PRE: 60 %
VA-PRE: 3.95 L
VC-%PRED-PRE: 61 %
VC-PRE: 3.08 L
VC-PRED: 4.99 L

## 2022-12-19 DIAGNOSIS — R06.02 SHORTNESS OF BREATH: ICD-10-CM

## 2022-12-19 DIAGNOSIS — J84.9 ILD (INTERSTITIAL LUNG DISEASE) (H): ICD-10-CM

## 2022-12-19 RX ORDER — MORPHINE SULFATE 15 MG/1
15 TABLET, FILM COATED, EXTENDED RELEASE ORAL EVERY 12 HOURS
Qty: 60 TABLET | Refills: 0 | Status: SHIPPED | OUTPATIENT
Start: 2022-12-23 | End: 2023-01-24

## 2022-12-19 NOTE — TELEPHONE ENCOUNTER
Received voicemail from patient requesting refill of MS contin.     Last refill: 11/25/22  Last office visit: 12/6/22  Scheduled for follow up per checkout request     Will route request to MD for review.     Reviewed [place]  Report.

## 2023-01-23 DIAGNOSIS — J84.9 ILD (INTERSTITIAL LUNG DISEASE) (H): ICD-10-CM

## 2023-01-23 DIAGNOSIS — R06.02 SHORTNESS OF BREATH: ICD-10-CM

## 2023-01-23 NOTE — TELEPHONE ENCOUNTER
Received telephone call from patient requesting refill of MS Contin.     Last refill: 12/26/22  Last office visit: 12/6/22  Scheduled for follow up 3/7/23     Will route request to MD for review.     Reviewed MN  Report.

## 2023-01-24 RX ORDER — MORPHINE SULFATE 15 MG/1
15 TABLET, FILM COATED, EXTENDED RELEASE ORAL EVERY 12 HOURS
Qty: 60 TABLET | Refills: 0 | Status: SHIPPED | OUTPATIENT
Start: 2023-01-24 | End: 2023-02-22

## 2023-02-18 ENCOUNTER — HEALTH MAINTENANCE LETTER (OUTPATIENT)
Age: 63
End: 2023-02-18

## 2023-02-22 DIAGNOSIS — J84.9 ILD (INTERSTITIAL LUNG DISEASE) (H): ICD-10-CM

## 2023-02-22 DIAGNOSIS — R06.02 SHORTNESS OF BREATH: ICD-10-CM

## 2023-02-22 RX ORDER — MORPHINE SULFATE 15 MG/1
15 TABLET, FILM COATED, EXTENDED RELEASE ORAL EVERY 12 HOURS
Qty: 60 TABLET | Refills: 0 | Status: SHIPPED | OUTPATIENT
Start: 2023-02-22 | End: 2023-03-21

## 2023-02-22 NOTE — TELEPHONE ENCOUNTER
Received voicemail from patient requesting refill of MS Contin.     Last refill: 1/24/23  Last office visit: 12/6/22  Scheduled for follow up 3/7/23     Will route request to MD for review.     Reviewed MN  Report.

## 2023-03-07 ENCOUNTER — OFFICE VISIT (OUTPATIENT)
Dept: PALLIATIVE CARE | Facility: CLINIC | Age: 63
End: 2023-03-07
Attending: INTERNAL MEDICINE
Payer: COMMERCIAL

## 2023-03-07 VITALS
OXYGEN SATURATION: 98 % | HEART RATE: 66 BPM | DIASTOLIC BLOOD PRESSURE: 82 MMHG | WEIGHT: 233 LBS | RESPIRATION RATE: 18 BRPM | SYSTOLIC BLOOD PRESSURE: 133 MMHG | BODY MASS INDEX: 33.43 KG/M2 | TEMPERATURE: 98.5 F

## 2023-03-07 DIAGNOSIS — Z79.891 ENCOUNTER FOR LONG-TERM OPIATE ANALGESIC USE: ICD-10-CM

## 2023-03-07 DIAGNOSIS — J84.9 ILD (INTERSTITIAL LUNG DISEASE) (H): Primary | ICD-10-CM

## 2023-03-07 DIAGNOSIS — R06.02 SHORTNESS OF BREATH: ICD-10-CM

## 2023-03-07 PROCEDURE — 80359 METHYLENEDIOXYAMPHETAMINES: CPT | Performed by: INTERNAL MEDICINE

## 2023-03-07 PROCEDURE — 80334 ANTIDEPRESSANTS CLASS 6/MORE: CPT | Performed by: INTERNAL MEDICINE

## 2023-03-07 PROCEDURE — 80368 SEDATIVE HYPNOTICS: CPT | Performed by: INTERNAL MEDICINE

## 2023-03-07 PROCEDURE — 80337 TRICYCLIC & CYCLICALS 6/MORE: CPT | Performed by: INTERNAL MEDICINE

## 2023-03-07 PROCEDURE — 80371 STIMULANTS SYNTHETIC: CPT | Performed by: INTERNAL MEDICINE

## 2023-03-07 PROCEDURE — 80365 DRUG SCREENING OXYCODONE: CPT | Performed by: INTERNAL MEDICINE

## 2023-03-07 PROCEDURE — 84999 UNLISTED CHEMISTRY PROCEDURE: CPT | Performed by: INTERNAL MEDICINE

## 2023-03-07 PROCEDURE — 80367 DRUG SCREENING PROPOXYPHENE: CPT | Performed by: INTERNAL MEDICINE

## 2023-03-07 PROCEDURE — G0480 DRUG TEST DEF 1-7 CLASSES: HCPCS | Performed by: INTERNAL MEDICINE

## 2023-03-07 PROCEDURE — 80373 DRUG SCREENING TRAMADOL: CPT | Performed by: INTERNAL MEDICINE

## 2023-03-07 PROCEDURE — 80331 ANALGESICS NON-OPIOID 6/MORE: CPT | Performed by: INTERNAL MEDICINE

## 2023-03-07 PROCEDURE — 80344 ANTIPSYCHOTICS NOS 7/MORE: CPT | Performed by: INTERNAL MEDICINE

## 2023-03-07 PROCEDURE — 80372 DRUG SCREENING TAPENTADOL: CPT | Performed by: INTERNAL MEDICINE

## 2023-03-07 PROCEDURE — 80338 ANTIDEPRESSANT NOT SPECIFIED: CPT | Performed by: INTERNAL MEDICINE

## 2023-03-07 PROCEDURE — 80354 DRUG SCREENING FENTANYL: CPT | Performed by: INTERNAL MEDICINE

## 2023-03-07 PROCEDURE — 80366 DRUG SCREENING PREGABALIN: CPT | Performed by: INTERNAL MEDICINE

## 2023-03-07 PROCEDURE — 80364 OPIOID &OPIATE ANALOG 5/MORE: CPT | Performed by: INTERNAL MEDICINE

## 2023-03-07 PROCEDURE — 80347 BENZODIAZEPINES 13 OR MORE: CPT | Performed by: INTERNAL MEDICINE

## 2023-03-07 PROCEDURE — 80355 GABAPENTIN NON-BLOOD: CPT | Performed by: INTERNAL MEDICINE

## 2023-03-07 PROCEDURE — G0463 HOSPITAL OUTPT CLINIC VISIT: HCPCS | Performed by: INTERNAL MEDICINE

## 2023-03-07 PROCEDURE — 80357 KETAMINE AND NORKETAMINE: CPT | Performed by: INTERNAL MEDICINE

## 2023-03-07 PROCEDURE — 80341 ANTIEPILEPTICS NOS 7/MORE: CPT | Performed by: INTERNAL MEDICINE

## 2023-03-07 PROCEDURE — 80307 DRUG TEST PRSMV CHEM ANLYZR: CPT | Performed by: INTERNAL MEDICINE

## 2023-03-07 PROCEDURE — 99215 OFFICE O/P EST HI 40 MIN: CPT | Performed by: INTERNAL MEDICINE

## 2023-03-07 PROCEDURE — 83992 ASSAY FOR PHENCYCLIDINE: CPT | Performed by: INTERNAL MEDICINE

## 2023-03-07 PROCEDURE — G0481 DRUG TEST DEF 8-14 CLASSES: HCPCS | Performed by: INTERNAL MEDICINE

## 2023-03-07 PROCEDURE — 80358 DRUG SCREENING METHADONE: CPT | Performed by: INTERNAL MEDICINE

## 2023-03-07 PROCEDURE — 80353 DRUG SCREENING COCAINE: CPT | Performed by: INTERNAL MEDICINE

## 2023-03-07 PROCEDURE — 80348 DRUG SCREENING BUPRENORPHINE: CPT | Performed by: INTERNAL MEDICINE

## 2023-03-07 PROCEDURE — 80370 SKEL MUSC RELAXANT 3 OR MORE: CPT | Performed by: INTERNAL MEDICINE

## 2023-03-07 PROCEDURE — 80360 METHYLPHENIDATE: CPT | Performed by: INTERNAL MEDICINE

## 2023-03-07 PROCEDURE — 80326 AMPHETAMINES 5 OR MORE: CPT | Performed by: INTERNAL MEDICINE

## 2023-03-07 ASSESSMENT — PAIN SCALES - GENERAL: PAINLEVEL: MILD PAIN (2)

## 2023-03-07 NOTE — NURSING NOTE
"Oncology Rooming Note    March 7, 2023 2:14 PM   Wilbert Hutchins is a 62 year old male who presents for:    Chief Complaint   Patient presents with     Oncology Clinic Visit     Pt is here for  a rtn for ILD     Initial Vitals: Blood Pressure 133/82   Pulse 66   Temperature 98.5  F (36.9  C) (Oral)   Respiration 18   Weight 105.7 kg (233 lb)   Oxygen Saturation 98%   Body Mass Index 33.43 kg/m   Estimated body mass index is 33.43 kg/m  as calculated from the following:    Height as of 12/7/22: 1.778 m (5' 10\").    Weight as of this encounter: 105.7 kg (233 lb). Body surface area is 2.28 meters squared.  Mild Pain (2) Comment: Data Unavailable   No LMP for male patient.  Allergies reviewed: Yes  Medications reviewed: Yes    Medications: Medication refills not needed today.  Pharmacy name entered into [employer]: [facility] [address] [place] [address]    Clinical concerns: none       Nell Nunez MA            "

## 2023-03-07 NOTE — PROGRESS NOTES
"Palliative Care Outpatient Clinic      Patient ID:  Medical - He has ILD + undifferentiated connective tissue disease on 6 lpm (previously on 4L) O2. Not interested in lung transplant; smokes tobacco and cannabis daily.      We have been following for dyspnea, Dr Oliveira started morphine-->morphineER 15mg bid Summer 2021.     Social - Lives with wife Eve. 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  Cannabis and tobacco use disorders: continues to smoke despite health consequences & ineligiblity for lung transplantation; considers his smoking as integral to his qol and is not interested in stopping. Estranged relationships with children \"their choice, not mine.\"   Has homecare nursing and case management via [employer] program via [employer].      Care Planning - +[facility] on file, names Myra as his agent. DNR/DNI POLST completed 11/2021. Hospice education completed (he is not eligible currently)     Opioid Safety - +Naloxone  Last UDS 3/2022 as expected    History:  History gathered today from: patient, medical chart, outside records including Care Everywhere    Since I saw him last: saw his pulmonologist here. Has not seen his rheumatologist at [facility] since Sept    Sx:  Overall a little worse. Also more frustrated due to weahter limitations--very little he can do outside the house because he can't walk far and can't manage with his scooter with all the ice and snow. Dyspnea with just a few steps and needs to rest.  On stable morphine doses; no major changes there; every now and then runs out and dyspnea, function much worse off morphine although they aren't great on it. No constipation    Continues to smoke tobacco and cannabis although slowing down because his breathing is overall worse    PE: /82   Pulse 66   Temp 98.5  F (36.9  C) (Oral)   Resp 18   Wt 105.7 kg (233 lb)   SpO2 98%   BMI 33.43 kg/m     Wt Readings from Last 3 Encounters:   03/07/23 105.7 kg (233 lb) "   12/07/22 105.7 kg (233 lb)   09/23/22 108.4 kg (239 lb)     Alert NAD  Clear sensorium full affect  Speaking in full senteneces      Data reviewed:  I reviewed recent labs and imaging, my comments: Outside Cr 0.87 in Sept; LFTs nl  PFTs Dec  The FEV1 and FVC are reduced, but the FEV1/FVC ratio is normal.  The   inspiratory flow rates are within normal limits.  The diffusing capacity is   reduced and was not corrected for the patient's hemoglobin.  IMPRESSION:  Mild restriction, confirmed by reduced TLC on 6-.  Severe diffusion defect.       [facility] database reviewed: y      Impression & Recommendations:    61 yo with severe ILD and dyspnea    No changes rec'd; modeslty helped by low dose MSContin without any obvious side effects or downsides  Due for UDS today as a routine matter  No opioid safety concerns  Mood a little worse--more demoralized; I think largely due to the restrictions in movement due to the weather/sidewalks. Will follow.    Follow-up 2-3 mo      42 minutes spent on the date of the encounter doing chart review, history and exam, patient education & counseling, documentation and other activities as noted above.    Thank you for involving us in the patient's care.   Jostin Peralta MD / Palliative Medicine / Text me via [facility].

## 2023-03-14 LAB — DRUGS UR: NORMAL

## 2023-03-21 ENCOUNTER — PATIENT OUTREACH (OUTPATIENT)
Dept: PALLIATIVE CARE | Facility: CLINIC | Age: 63
End: 2023-03-21
Payer: COMMERCIAL

## 2023-03-21 DIAGNOSIS — J84.9 ILD (INTERSTITIAL LUNG DISEASE) (H): ICD-10-CM

## 2023-03-21 DIAGNOSIS — R06.02 SHORTNESS OF BREATH: ICD-10-CM

## 2023-03-21 RX ORDER — MORPHINE SULFATE 15 MG/1
15 TABLET, FILM COATED, EXTENDED RELEASE ORAL EVERY 12 HOURS
Qty: 60 TABLET | Refills: 0 | Status: SHIPPED | OUTPATIENT
Start: 2023-03-24 | End: 2023-04-25

## 2023-03-21 NOTE — PROGRESS NOTES
[facility]: Palliative Care                                                                                        Received voicemail from patient requesting refill of morphine.     Last refill: 2/22/2023  Last office visit: 3/7/2023  Scheduled for follow up 6/9/2023     Will route request to MD for review.     Reviewed [place]  Report.       Signature:  LALIT FayN, RN, OCN

## 2023-04-05 ENCOUNTER — OFFICE VISIT (OUTPATIENT)
Dept: PULMONOLOGY | Facility: CLINIC | Age: 63
End: 2023-04-05
Attending: INTERNAL MEDICINE
Payer: COMMERCIAL

## 2023-04-05 VITALS
BODY MASS INDEX: 34.36 KG/M2 | HEIGHT: 70 IN | WEIGHT: 240 LBS | DIASTOLIC BLOOD PRESSURE: 88 MMHG | RESPIRATION RATE: 17 BRPM | HEART RATE: 61 BPM | OXYGEN SATURATION: 97 % | SYSTOLIC BLOOD PRESSURE: 154 MMHG

## 2023-04-05 DIAGNOSIS — J41.1 MUCOPURULENT CHRONIC BRONCHITIS (H): ICD-10-CM

## 2023-04-05 DIAGNOSIS — M05.10 RHEUMATOID LUNG (H): ICD-10-CM

## 2023-04-05 DIAGNOSIS — J84.9 ILD (INTERSTITIAL LUNG DISEASE) (H): Primary | ICD-10-CM

## 2023-04-05 PROCEDURE — 99214 OFFICE O/P EST MOD 30 MIN: CPT | Mod: 25 | Performed by: INTERNAL MEDICINE

## 2023-04-05 PROCEDURE — G0463 HOSPITAL OUTPT CLINIC VISIT: HCPCS | Performed by: INTERNAL MEDICINE

## 2023-04-05 PROCEDURE — 94729 DIFFUSING CAPACITY: CPT | Performed by: INTERNAL MEDICINE

## 2023-04-05 PROCEDURE — 94375 RESPIRATORY FLOW VOLUME LOOP: CPT | Performed by: INTERNAL MEDICINE

## 2023-04-05 ASSESSMENT — PAIN SCALES - GENERAL: PAINLEVEL: NO PAIN (0)

## 2023-04-05 NOTE — LETTER
4/5/2023         RE: Wilbert Hutchins  [address]  [place] [facility] [place] [facility] [address]        Dear Colleague,    Thank you for referring your patient, Wilbert Hutchins, to the [facility] [place]. Please see a copy of my visit note below.    [facility]  ILD Clinic - Return Visit -  April, 5th, 2023         Assessment and Plan:   Wilbert Hutchins is a 63 year old male with a history of interstitial lung disease who presents for a return visit.     1) Rheumatoid lung, slowly progressive   - Indeterminate for UIP in the setting of rheumatoid, DDx include DIP and RA lung (favored diagnosis)  - He continues to smoke cigarettes (down to 2-10 cig/day), he has tried wellbutrin in the past but suffered with side effects. Declined chantix but will ask for it if he is not successful on his own. He thinks he will quit very soon.   - Lung transplantation discussed previously by Dr. Lin, and he understands he will not be a candidate for lung transplantation due to his current smoking. If smoking cessation successful then we can make lung transplant referral. He also hesitates to think about it being a major surgery.    - Started RTX infusions with rheumatology Dr. Silva 3/2023, hasn't noticed symptomatic improvement yet.   - His PFTs today show overall very slow progression compared to 2019.   - PFT in 6 months with return visit     A) Supplemental oxygen  - Using portable oxygen with compliance, needs 4 LPM at rest and 6 LPM with activity.     B) Nocturnal oximetry  He is using oxygen at nighttime at 4 LPM, declined sleep referral for now, had sleep study 2018, AHI was 4.     2) Continued Tobacco Use  -As above, we discussed the importance of smoking cessation.  He will reach out to us if he feels as though he can benefit from chantix but wants to keep trying without pharmacologic aid give  success.    3) GOLD D COPD   Overlapping with ILD, but symptoms are consistent with mucopurulent chronic bronchitis. Being on oxygen qualifies for GOLD D.  - Continue Anoro ellipta, 1 inhalation qAM  - No need for ICS given current systemic prednisone  - Continue Daliresp 500 mcg po daily     4) GRIS  AHI 4.2/hr 2018, significant other noticed gasping of a air twice over the last year. Needs to re-establish with sleep clinic but declined.     RTC: 6 months with PFTs   Influenza and other vaccinations: Up to date on Prevnar. He has received the Pfizer COVID-19 vaccine, including booster dose.    I spent 30 minutes  total today, reviewing medical records including progress notes, multiple imaging studies from his previous available records, and documentation.      Matt Suárez MD  Pulmonary and Critical Care Medicine          Problem List:     Interstitial lung disease  HRCT (May 31, 2019) Indeterminate for UIP;   Peripheral reticulation, mild paraseptal emphysema, GGO and peripheral reticulation and fibrosis basilar predominant. Mediastinal lymphadenopathy.  No air trapping on expiratory views.  Pulm: Dr. Jack Machado ([facility])  Rheum: Dr. Silva ([facility])  Treatment   On Prednisone since March 2018, current dose 5 mg daily   CellCept initiated in 2019 but could not tolerate due to exacerbation of arthralgias  On Plaquinil   Started RTX 3/2023     Tobacco and Marijuana use     Migrating arthralgias  Positive RF (52) and Anti-CCP (76)  No erosive arthritis on right hand film (April 6, 2017)     Obesity (Body mass index is 34.4 kg/m .        Interval History:     Wilbert Hutchins is a 61 year old male with interstitial lung disease who presents for a return visit.  He was last seen in by me in 12/2022.    He continues to need oxygen at 6 LPM and very short of breath with minimal activity. Needs albuterol inhaler on daily basis. Started last visit on Anoro with partial relief in dyspnea.  "He is also toelrating daliresp 500 which was ramped up and thinks it helps his dyspnea and cough. Cigarettes are down to an average of 6 per day. No recent hospitalizations. He notes that his O2 need at rest is up to 4 LPM.            Physical Exam:   BP (!) 154/88   Pulse 61   Resp 17   Ht 1.778 m (5' 10\")   Wt 108.9 kg (240 lb)   SpO2 97%   BMI 34.44 kg/m        GENERAL: alert, NAD  HEENT: NCAT, EOMI, masked  Lungs: decreased air movement, pan-inspiratory crackles more prominent at bases  CV: RRR, S1S2, no murmurs noted  Neuro: AAO X 3  Psychiatric: normal affect, good eye contact  Skin: no rash, jaundice or lesions on limited exam  Extremities: No cyanosis, clubbing, trace edema.         Imaging:     HRCT chest images from 9/15/22 reviewed and compared to 9/2021  Agreed with radiology:  IMPRESSION:  Stable fibrotic changes in the lungs consistent with  interstitial lung disease alternative diagnosis to UIP per ATS  criteria. Possibly CPFE or sequela of DIP. No acute airspace  consolidation to suggest active infection.         Pulmonary Function Tests:            4/5/34                     3.13      2.46        79       12.48           Cardiac:     TTE (11/24/2020)  Interpretation Summary  Global and regional left ventricular function is normal with an EF of 60-65%.  Global right ventricular function is normal.  There is late appearance of bubbles in the left heart on the bubble study. A small patent foramen ovale cannot be excluded.  No significant valvular abnormalities present.  The inferior vena cava was normal in size with preserved respiratory variability.  No pericardial effusion is present.    Transthoracic echocardiogram (July 19, 2019)  Global and regional left ventricular function is normal with an EF of 60-65%.  Right ventricular function, chamber size, wall motion, and thickness are  Normal.   The atrial septum is intact as assessed by color Doppler and agitated saline  bubble study.  Pulmonary " artery systolic pressure cannot be assessed.  The inferior vena cava is normal.  No pericardial effusion is present.  *PA acceleration time reported as 130msec (normal to borderline)     Stress echo (March 22, 2017)  1. No ischemia at the cardiac workload achieved.  2. Left ventricular function normal at rest, improved with stress.  3. No ischemic ECG response with adequate heart rate.  4. The patient did not report angina with stress.  5. Exercise capacity was good .  6. The baseline echocardiogram revealed no significant valvular  abnormalities. There is focal non specific change of the aortic valve.  7. Normal blood pressure response with exercise.          Again, thank you for allowing me to participate in the care of your patient.        Sincerely,        Matt Suárez MD

## 2023-04-05 NOTE — PROGRESS NOTES
[facility]  [facility] for [facility]  ILD Clinic - Return Visit -  April, 5th, 2023         Assessment and Plan:   Wilbert Hutchins is a 63 year old male with a history of interstitial lung disease who presents for a return visit.     1) Rheumatoid lung, slowly progressive   - Indeterminate for UIP in the setting of rheumatoid, DDx include DIP and RA lung (favored diagnosis)  - He continues to smoke cigarettes (down to 2-10 cig/day), he has tried wellbutrin in the past but suffered with side effects. Declined chantix but will ask for it if he is not successful on his own. He thinks he will quit very soon.   - Lung transplantation discussed previously by Dr. Lin, and he understands he will not be a candidate for lung transplantation due to his current smoking. If smoking cessation successful then we can make lung transplant referral. He also hesitates to think about it being a major surgery.    - Started RTX infusions with rheumatology Dr. Silva 3/2023, hasn't noticed symptomatic improvement yet.   - His PFTs today show overall very slow progression compared to 2019.   - PFT in 6 months with return visit     A) Supplemental oxygen  - Using portable oxygen with compliance, needs 4 LPM at rest and 6 LPM with activity.     B) Nocturnal oximetry  He is using oxygen at nighttime at 4 LPM, declined sleep referral for now, had sleep study 2018, AHI was 4.     2) Continued Tobacco Use  -As above, we discussed the importance of smoking cessation.  He will reach out to us if he feels as though he can benefit from chantix but wants to keep trying without pharmacologic aid give success.    3) GOLD D COPD   Overlapping with ILD, but symptoms are consistent with mucopurulent chronic bronchitis. Being on oxygen qualifies for GOLD D.  - Continue Anoro ellipta, 1 inhalation qAM  - No need for ICS given current systemic prednisone  - Continue Daliresp 500 mcg po daily     4) GRIS  AHI 4.2/hr  2018, significant other noticed gasping of a air twice over the last year. Needs to re-establish with sleep clinic but declined.     RTC: 6 months with PFTs   Influenza and other vaccinations: Up to date on Prevnar. He has received the Pfizer COVID-19 vaccine, including booster dose.    I spent 30 minutes  total today, reviewing medical records including progress notes, multiple imaging studies from his previous available records, and documentation.      Matt Suárez MD  Pulmonary and Critical Care Medicine          Problem List:     1. Interstitial lung disease  a. HRCT (May 31, 2019) Indeterminate for UIP;   i. Peripheral reticulation, mild paraseptal emphysema, GGO and peripheral reticulation and fibrosis basilar predominant. Mediastinal lymphadenopathy.  No air trapping on expiratory views.  b. Pulm: Dr. Jack Machado ([facility])  c. Rheum: Dr. Silva ([facility])  d. Treatment   i. On Prednisone since March 2018, current dose 5 mg daily   ii. CellCept initiated in 2019 but could not tolerate due to exacerbation of arthralgias  iii. On Plaquinil   iv. Started RTX 3/2023     2. Tobacco and Marijuana use     3. Migrating arthralgias  a. Positive RF (52) and Anti-CCP (76)  b. No erosive arthritis on right hand film (April 6, 2017)     4. Obesity (Body mass index is 34.4 kg/m .        Interval History:     Wilbert Hutchins is a 61 year old male with interstitial lung disease who presents for a return visit.  He was last seen in by me in 12/2022.    He continues to need oxygen at 6 LPM and very short of breath with minimal activity. Needs albuterol inhaler on daily basis. Started last visit on Anoro with partial relief in dyspnea. He is also toelrating daliresp 500 which was ramped up and thinks it helps his dyspnea and cough. Cigarettes are down to an average of 6 per day. No recent hospitalizations. He notes that his O2 need at rest is up to 4 LPM.            Physical Exam:   BP (!) 154/88  "  Pulse 61   Resp 17   Ht 1.778 m (5' 10\")   Wt 108.9 kg (240 lb)   SpO2 97%   BMI 34.44 kg/m        GENERAL: alert, NAD  HEENT: NCAT, EOMI, masked  Lungs: decreased air movement, pan-inspiratory crackles more prominent at bases  CV: RRR, S1S2, no murmurs noted  Neuro: AAO X 3  Psychiatric: normal affect, good eye contact  Skin: no rash, jaundice or lesions on limited exam  Extremities: No cyanosis, clubbing, trace edema.         Imaging:     HRCT chest images from 9/15/22 reviewed and compared to 9/2021  Agreed with radiology:  IMPRESSION:  Stable fibrotic changes in the lungs consistent with  interstitial lung disease alternative diagnosis to UIP per ATS  criteria. Possibly CPFE or sequela of DIP. No acute airspace  consolidation to suggest active infection.         Pulmonary Function Tests:            4/5/34                     3.13      2.46        79       12.48           Cardiac:     TTE (11/24/2020)  Interpretation Summary  Global and regional left ventricular function is normal with an EF of 60-65%.  Global right ventricular function is normal.  There is late appearance of bubbles in the left heart on the bubble study. A small patent foramen ovale cannot be excluded.  No significant valvular abnormalities present.  The inferior vena cava was normal in size with preserved respiratory variability.  No pericardial effusion is present.    Transthoracic echocardiogram (July 19, 2019)  Global and regional left ventricular function is normal with an EF of 60-65%.  Right ventricular function, chamber size, wall motion, and thickness are  Normal.   The atrial septum is intact as assessed by color Doppler and agitated saline  bubble study.  Pulmonary artery systolic pressure cannot be assessed.  The inferior vena cava is normal.  No pericardial effusion is present.  *PA acceleration time reported as 130msec (normal to borderline)     Stress echo (March 22, 2017)  1. No ischemia at the cardiac workload " achieved.  2. Left ventricular function normal at rest, improved with stress.  3. No ischemic ECG response with adequate heart rate.  4. The patient did not report angina with stress.  5. Exercise capacity was good .  6. The baseline echocardiogram revealed no significant valvular  abnormalities. There is focal non specific change of the aortic valve.  7. Normal blood pressure response with exercise.        Answers for HPI/ROS submitted by the patient on 3/29/2023  General Symptoms: No  Skin Symptoms: No  HENT Symptoms: No  EYE SYMPTOMS: No  HEART SYMPTOMS: No  LUNG SYMPTOMS: No  INTESTINAL SYMPTOMS: No  URINARY SYMPTOMS: No  REPRODUCTIVE SYMPTOMS: No  SKELETAL SYMPTOMS: No  BLOOD SYMPTOMS: No  NERVOUS SYSTEM SYMPTOMS: No  MENTAL HEALTH SYMPTOMS: No

## 2023-04-07 LAB
DLCOUNC-%PRED-PRE: 45 %
DLCOUNC-PRE: 12.49 ML/MIN/MMHG
DLCOUNC-PRED: 27.46 ML/MIN/MMHG
ERV-%PRED-PRE: 134 %
ERV-PRE: 1.11 L
ERV-PRED: 0.83 L
EXPTIME-PRE: 7.64 SEC
FEF2575-%PRED-PRE: 68 %
FEF2575-PRE: 1.9 L/SEC
FEF2575-PRED: 2.78 L/SEC
FEFMAX-%PRED-PRE: 89 %
FEFMAX-PRE: 8.21 L/SEC
FEFMAX-PRED: 9.15 L/SEC
FEV1-%PRED-PRE: 74 %
FEV1-PRE: 2.46 L
FEV1FEV6-PRE: 77 %
FEV1FEV6-PRED: 79 %
FEV1FVC-PRE: 79 %
FEV1FVC-PRED: 78 %
FEV1SVC-PRE: 76 %
FEV1SVC-PRED: 67 %
FIFMAX-PRE: 6.51 L/SEC
FVC-%PRED-PRE: 73 %
FVC-PRE: 3.13 L
FVC-PRED: 4.24 L
IC-%PRED-PRE: 50 %
IC-PRE: 2.1 L
IC-PRED: 4.14 L
VA-%PRED-PRE: 64 %
VA-PRE: 4.19 L
VC-%PRED-PRE: 64 %
VC-PRE: 3.21 L
VC-PRED: 4.97 L

## 2023-04-24 DIAGNOSIS — R06.02 SHORTNESS OF BREATH: ICD-10-CM

## 2023-04-24 DIAGNOSIS — J84.9 ILD (INTERSTITIAL LUNG DISEASE) (H): ICD-10-CM

## 2023-04-24 NOTE — TELEPHONE ENCOUNTER
Received voicemail from patient requesting refill of morphine.      Last refill: 3/27/23  Last office visit: 3/7/2023  Scheduled for follow up 6/9/2023      Will route request to MD for review.      Reviewed [place]  Report.

## 2023-04-25 RX ORDER — MORPHINE SULFATE 15 MG/1
15 TABLET, FILM COATED, EXTENDED RELEASE ORAL EVERY 12 HOURS
Qty: 60 TABLET | Refills: 0 | Status: SHIPPED | OUTPATIENT
Start: 2023-04-25 | End: 2023-05-23

## 2023-05-09 DIAGNOSIS — J44.9 COPD, GROUP D, BY GOLD 2017 CLASSIFICATION (H): ICD-10-CM

## 2023-05-09 RX ORDER — UMECLIDINIUM BROMIDE AND VILANTEROL TRIFENATATE 62.5; 25 UG/1; UG/1
1 POWDER RESPIRATORY (INHALATION) DAILY
Qty: 60 EACH | Refills: 0 | Status: SHIPPED | OUTPATIENT
Start: 2023-05-09 | End: 2023-07-03

## 2023-05-22 DIAGNOSIS — J84.9 ILD (INTERSTITIAL LUNG DISEASE) (H): ICD-10-CM

## 2023-05-22 DIAGNOSIS — R06.02 SHORTNESS OF BREATH: ICD-10-CM

## 2023-05-22 NOTE — TELEPHONE ENCOUNTER
Received voicemail from patient requesting refill of MS Contin.     Last refill: 4/25/23  Last office visit: 3/7/23  Scheduled for follow up 6/9/23     Will route request to MD for review.     Reviewed [place]  Report.

## 2023-05-23 RX ORDER — MORPHINE SULFATE 15 MG/1
15 TABLET, FILM COATED, EXTENDED RELEASE ORAL EVERY 12 HOURS
Qty: 60 TABLET | Refills: 0 | Status: SHIPPED | OUTPATIENT
Start: 2023-05-23 | End: 2023-06-09

## 2023-06-09 ENCOUNTER — ONCOLOGY VISIT (OUTPATIENT)
Dept: PALLIATIVE CARE | Facility: CLINIC | Age: 63
End: 2023-06-09
Attending: INTERNAL MEDICINE
Payer: COMMERCIAL

## 2023-06-09 VITALS
OXYGEN SATURATION: 97 % | BODY MASS INDEX: 32.21 KG/M2 | RESPIRATION RATE: 16 BRPM | WEIGHT: 225 LBS | SYSTOLIC BLOOD PRESSURE: 133 MMHG | HEART RATE: 61 BPM | HEIGHT: 70 IN | DIASTOLIC BLOOD PRESSURE: 74 MMHG

## 2023-06-09 DIAGNOSIS — J84.9 ILD (INTERSTITIAL LUNG DISEASE) (H): Primary | ICD-10-CM

## 2023-06-09 DIAGNOSIS — R45.86 MOOD AND AFFECT DISTURBANCE: ICD-10-CM

## 2023-06-09 DIAGNOSIS — R06.02 SHORTNESS OF BREATH: ICD-10-CM

## 2023-06-09 PROCEDURE — 99215 OFFICE O/P EST HI 40 MIN: CPT | Performed by: INTERNAL MEDICINE

## 2023-06-09 PROCEDURE — G0463 HOSPITAL OUTPT CLINIC VISIT: HCPCS | Performed by: INTERNAL MEDICINE

## 2023-06-09 RX ORDER — MORPHINE SULFATE 15 MG/1
15 TABLET, FILM COATED, EXTENDED RELEASE ORAL EVERY 12 HOURS
Qty: 60 TABLET | Refills: 0 | Status: SHIPPED | OUTPATIENT
Start: 2023-06-24 | End: 2023-07-26

## 2023-06-09 ASSESSMENT — PAIN SCALES - GENERAL: PAINLEVEL: MILD PAIN (2)
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
## 2023-06-09 NOTE — NURSING NOTE
"Oncology Rooming Note    June 9, 2023 11:32 AM   Wilbert Hutchins is a 63 year old male who presents for:    Chief Complaint   Patient presents with     Oncology Clinic Visit     [facility] RETURN - ILD     Initial Vitals: /74 (BP Location: Right arm, Patient Position: Chair, Cuff Size: Adult Large)   Pulse 61   Resp 16   Ht 1.778 m (5' 10\")   Wt 102.1 kg (225 lb)   SpO2 97%   BMI 32.28 kg/m   Estimated body mass index is 32.28 kg/m  as calculated from the following:    Height as of this encounter: 1.778 m (5' 10\").    Weight as of this encounter: 102.1 kg (225 lb). Body surface area is 2.25 meters squared.  Mild Pain (2) Comment: Data Unavailable   No LMP for male patient.  Allergies reviewed: Yes  Medications reviewed: Yes    Medications: Medication refills not needed today.  Pharmacy name entered into [employer]: [facility] [address] [place] [address] 10    Mart Kalia Sim LPN            "

## 2023-06-30 DIAGNOSIS — J44.9 COPD, GROUP D, BY GOLD 2017 CLASSIFICATION (H): ICD-10-CM

## 2023-07-03 RX ORDER — UMECLIDINIUM BROMIDE AND VILANTEROL TRIFENATATE 62.5; 25 UG/1; UG/1
1 POWDER RESPIRATORY (INHALATION) DAILY
Qty: 60 EACH | Refills: 0 | Status: SHIPPED | OUTPATIENT
Start: 2023-07-03 | End: 2023-08-18

## 2023-07-26 DIAGNOSIS — R06.02 SHORTNESS OF BREATH: ICD-10-CM

## 2023-07-26 DIAGNOSIS — J84.9 ILD (INTERSTITIAL LUNG DISEASE) (H): ICD-10-CM

## 2023-07-26 RX ORDER — MORPHINE SULFATE 15 MG/1
15 TABLET, FILM COATED, EXTENDED RELEASE ORAL EVERY 12 HOURS
Qty: 60 TABLET | Refills: 0 | Status: SHIPPED | OUTPATIENT
Start: 2023-07-26 | End: 2023-08-22

## 2023-07-26 NOTE — TELEPHONE ENCOUNTER
Received voicemail from patient requesting refill of MS Contin.     Last refill: 6/24/23  Last office visit: 6/9/23  Scheduled for follow up pending, msg sent to scheduling.     Will route request to MD for review.     Reviewed [place]  Report.

## 2023-07-28 DIAGNOSIS — J41.1 MUCOPURULENT CHRONIC BRONCHITIS (H): ICD-10-CM

## 2023-07-28 DIAGNOSIS — J44.9 COPD, GROUP D, BY GOLD 2017 CLASSIFICATION (H): ICD-10-CM

## 2023-07-28 RX ORDER — ROFLUMILAST 250 UG/1
TABLET ORAL
Qty: 90 TABLET | Refills: 0 | Status: SHIPPED | OUTPATIENT
Start: 2023-07-28 | End: 2023-09-18

## 2023-08-18 DIAGNOSIS — J44.9 COPD, GROUP D, BY GOLD 2017 CLASSIFICATION (H): ICD-10-CM

## 2023-08-18 RX ORDER — UMECLIDINIUM BROMIDE AND VILANTEROL TRIFENATATE 62.5; 25 UG/1; UG/1
1 POWDER RESPIRATORY (INHALATION) DAILY
Qty: 60 EACH | Refills: 3 | Status: SHIPPED | OUTPATIENT
Start: 2023-08-18 | End: 2023-12-29

## 2023-08-22 DIAGNOSIS — R06.02 SHORTNESS OF BREATH: ICD-10-CM

## 2023-08-22 DIAGNOSIS — J84.9 ILD (INTERSTITIAL LUNG DISEASE) (H): ICD-10-CM

## 2023-08-22 RX ORDER — MORPHINE SULFATE 15 MG/1
15 TABLET, FILM COATED, EXTENDED RELEASE ORAL EVERY 12 HOURS
Qty: 60 TABLET | Refills: 0 | Status: SHIPPED | OUTPATIENT
Start: 2023-08-26 | End: 2023-09-15

## 2023-08-22 NOTE — TELEPHONE ENCOUNTER
Received telephone call from patient requesting refill of MS Contin.     Last refill: 7/29/2023  Last office visit: 6/9/2023  Scheduled for follow up 9/12/2023     Will route request to MD for review.     Reviewed MN  Report.

## 2023-08-25 ENCOUNTER — TELEPHONE (OUTPATIENT)
Dept: PULMONOLOGY | Facility: CLINIC | Age: 63
End: 2023-08-25
Payer: COMMERCIAL

## 2023-08-25 NOTE — TELEPHONE ENCOUNTER
Patient Contacted for the patient to call back and schedule the following:    Appointment type: [facility]  Provider: kerry  Return date: 12/01/23  Specialty phone number: [phone]  Additional appointment(s) needed: 6MWT,FPFT  Additonal Notes: N/A

## 2023-09-07 ENCOUNTER — ONCOLOGY VISIT (OUTPATIENT)
Dept: ONCOLOGY | Facility: CLINIC | Age: 63
End: 2023-09-07
Attending: SOCIAL WORKER
Payer: COMMERCIAL

## 2023-09-07 DIAGNOSIS — F43.25 ADJUSTMENT DISORDER WITH MIXED DISTURBANCE OF EMOTIONS AND CONDUCT: Primary | ICD-10-CM

## 2023-09-07 PROCEDURE — 90834 PSYTX W PT 45 MINUTES: CPT | Performed by: SOCIAL WORKER

## 2023-09-07 NOTE — PROGRESS NOTES
"Palliative Care Clinical Social Work Return Appointment    PLEASE NOTE:  THIS IS A MENTAL HEALTH NOTE.  OTHER PROVIDERS VIEWING THIS NOTE SHOULD USE THIS ONLY FOR UNDERSTANDING THE CONTEXT OF THE PATIENT'S EXPERIENCE.  TOPICS DESCRIBED IN THIS NOTE SHOULD NOT BE REFERENCED TO THE PATIENT BY MEDICAL PROVIDERS.    Garry Hutchins is a 63 year old man with medical history that includes ILD, current tobacco and marijuana use, history of Alcohol use disorder, self reports of bipolar disorder, PTSD, and antisocial personality disorder, as well as adjustment disorder with mixed disturbance of emotions and conduct seen today at [facility] palliative care [facility].  Garry was seen my be for an initial  assessment in March 2021 but did not feel ongoing counseling was necessary at that time.  He was referred by Dr. Peralta (palliative) for low mood.  His care manager, Jack Vargas also encouraged him to meet with me \"he thinks I'm not living, I don't have a goal\"     for a return psychotherapy appointment to address adjustment related difficulties as it relates to coping with illness and treatment.    Mental Status Exam:(List all that apply)      Appearance: Appropriate      Eye Contact: Good       Orientation: Yes, x4      Mood: Normal      Affect: Appropriate      Thought Content: Clear         Thought Form: Logical      Psychomotor Behavior: Normal    Visit themes: updates to medical condition and social supports since last seen, exploration of what he hopes for with my services.     Adjustment to Illness: From discussion today does not seem there have been any new major diagnoses but Garry's ILD continues to progress.  When I last met him he spent much of his time in his garage connecting with others by playing interactive games online for 8-10 hours / day.  At this time, he is on portable O2 and his endurance is such that he can get outside to smoke for about 20 minutes at a time before he needs to come back in.  He " "continues to live with His spouse, Myra, of 20+ years  (I'm not sure how long they have been ) and, per his description, this relationship remains complicated.  He reports they will argue over issues related to his health. He denies concerns about verbal or physical abuse.     Garry shares that he is most concerned about feelings of suffocation at end of life.  He is working with Dr. Peralta for symptom support and finds this to be very helpful.      Mental Health (thoughts, feelings, actions, coping, and symptoms): \"I feel like I'm just waiting at this point\"     Processing style is fairly tangential     Garry expresses understanding that his illness is terminal and in his day to day life the main things he looks forward to are the times he is able to get out to smoke cigarettes or marijuana.  He and Myra took a trip to [place] a few weeks ago and Garry describes this as \"a spiritual experience\" -- he was in a different environment, he found meaning and pleasure in daily walks to the lake and to the river.     It seems that he is doing some form of life review at this time- however this is a complex process due a complex history of trauma that has led to many fractured relationships in his life. He expresses gratitude for his relationship with Myra, and also regret for the amount of time that he spent incarcerated while they have been together (approximately half of their relationship).  He expresses a desire to have reconciliation with his biological children as well as anger that they have not responded to his attempts to connect with them \"if you're still angry about what I did 20 years ago, well that's on you\"     Helpful activities: Day to day is difficult but he and Myra are taking a trip to the [facility] in a few weeks and he is looking forward to that. He has a motorized scooter that has increased his mobility as well.    Helpful cognitions:     Unhelpful and/or triggering or exacerbating factors: " Does not like where he lives, complex trauma history (please see my note from March 2021)     Body-Mind Skills Education:     Relationships: Complex.  Lives with spouse, Myra. He also has ongoign support from his care manager, Piyush who has known Garry for 7+ years.     End of Life and Advance Care Planning: Has a HCD and POLST on file     Main therapeutic interventions provided this session include:   Provide psychoeducation, Facilitate processing of thoughts and feelings, and life review, exploration of goals    Plan:  Will return for psychotherapy with palliative care focus in 6 weeks    Time spent with patient/family:  50 minutes  (Start 3:30, end 4:20)    BILL Gardner, [facility]   Palliative Care Clinical   Ph: [phone]      DO NOT SEND ANY LETTERS

## 2023-09-07 NOTE — LETTER
"    9/7/2023         RE: Wilbert Hutchins  [address]  [facility] [address]        Dear Colleague,    Thank you for referring your patient, Wilbert Hutchins, to the [facility] [place]. Please see a copy of my visit note below.    Palliative Care Clinical Social Work Return Appointment    PLEASE NOTE:  THIS IS A MENTAL HEALTH NOTE.  OTHER PROVIDERS VIEWING THIS NOTE SHOULD USE THIS ONLY FOR UNDERSTANDING THE CONTEXT OF THE PATIENT'S EXPERIENCE.  TOPICS DESCRIBED IN THIS NOTE SHOULD NOT BE REFERENCED TO THE PATIENT BY MEDICAL PROVIDERS.    Garry Hutchins is a 63 year old man with medical history that includes ILD, current tobacco and marijuana use, history of Alcohol use disorder, self reports of bipolar disorder, PTSD, and antisocial personality disorder, as well as adjustment disorder with mixed disturbance of emotions and conduct seen today at [facility] palliative care [facility].  Garry was seen my be for an initial  assessment in March 2021 but did not feel ongoing counseling was necessary at that time.  He was referred by Dr. Peralta (palliative) for low mood.  His care manager, Jack Vargas also encouraged him to meet with me \"he thinks I'm not living, I don't have a goal\"     for a return psychotherapy appointment to address adjustment related difficulties as it relates to coping with illness and treatment.    Mental Status Exam:(List all that apply)      Appearance: Appropriate      Eye Contact: Good       Orientation: Yes, x4      Mood: Normal      Affect: Appropriate      Thought Content: Clear         Thought Form: Logical      Psychomotor Behavior: Normal    Visit themes: updates to medical condition and social supports since last seen, exploration of what he hopes for with my services.     Adjustment to Illness: From discussion today does not seem there have been any new major diagnoses but Garry's ILD continues to progress.  When I last met him he spent much of " "his time in his garage connecting with others by playing interactive games online for 8-10 hours / day.  At this time, he is on portable O2 and his endurance is such that he can get outside to smoke for about 20 minutes at a time before he needs to come back in.  He continues to live with His spouse, Myra, of 20+ years  (I'm not sure how long they have been ) and, per his description, this relationship remains complicated.  He reports they will argue over issues related to his health. He denies concerns about verbal or physical abuse.     Garry shares that he is most concerned about feelings of suffocation at end of life.  He is working with Dr. Peralta for symptom support and finds this to be very helpful.      Mental Health (thoughts, feelings, actions, coping, and symptoms): \"I feel like I'm just waiting at this point\"     Processing style is fairly tangential     Garry expresses understanding that his illness is terminal and in his day to day life the main things he looks forward to are the times he is able to get out to smoke cigarettes or marijuana.  He and Myra took a trip to [place] a few weeks ago and Garry describes this as \"a spiritual experience\" -- he was in a different environment, he found meaning and pleasure in daily walks to the lake and to the river.     It seems that he is doing some form of life review at this time- however this is a complex process due a complex history of trauma that has led to many fractured relationships in his life. He expresses gratitude for his relationship with Myra, and also regret for the amount of time that he spent incarcerated while they have been together (approximately half of their relationship).  He expresses a desire to have reconciliation with his biological children as well as anger that they have not responded to his attempts to connect with them \"if you're still angry about what I did 20 years ago, well that's on you\"     Helpful activities: Day to day " is difficult but he and Myra are taking a trip to the [facility] in a few weeks and he is looking forward to that. He has a motorized scooter that has increased his mobility as well.    Helpful cognitions:     Unhelpful and/or triggering or exacerbating factors: Does not like where he lives, complex trauma history (please see my note from March 2021)     Body-Mind Skills Education:     Relationships: Complex.  Lives with spouse, Myra. He also has ongoign support from his care manager, Piyush who has known Garry for 7+ years.     End of Life and Advance Care Planning: Has a HCD and POLST on file     Main therapeutic interventions provided this session include:   Provide psychoeducation, Facilitate processing of thoughts and feelings, and life review, exploration of goals    Plan:  Will return for psychotherapy with palliative care focus in 6 weeks    Time spent with patient/family:  50 minutes  (Start 3:30, end 4:20)    BILL Gardner, [facility]   Palliative Care Clinical   Ph: [phone]      DO NOT SEND ANY LETTERS                Again, thank you for allowing me to participate in the care of your patient.        Sincerely,        DANTE Gardner

## 2023-09-15 ENCOUNTER — ONCOLOGY VISIT (OUTPATIENT)
Dept: PALLIATIVE CARE | Facility: CLINIC | Age: 63
End: 2023-09-15
Attending: INTERNAL MEDICINE
Payer: COMMERCIAL

## 2023-09-15 VITALS
SYSTOLIC BLOOD PRESSURE: 148 MMHG | RESPIRATION RATE: 16 BRPM | OXYGEN SATURATION: 96 % | DIASTOLIC BLOOD PRESSURE: 83 MMHG | TEMPERATURE: 98.4 F | HEART RATE: 59 BPM

## 2023-09-15 DIAGNOSIS — Z23 NEED FOR PROPHYLACTIC VACCINATION AND INOCULATION AGAINST INFLUENZA: ICD-10-CM

## 2023-09-15 DIAGNOSIS — Z71.89 ADVANCE CARE PLANNING: ICD-10-CM

## 2023-09-15 DIAGNOSIS — R06.02 SHORTNESS OF BREATH: ICD-10-CM

## 2023-09-15 DIAGNOSIS — Z79.891 ENCOUNTER FOR LONG-TERM OPIATE ANALGESIC USE: ICD-10-CM

## 2023-09-15 DIAGNOSIS — J84.9 ILD (INTERSTITIAL LUNG DISEASE) (H): Primary | ICD-10-CM

## 2023-09-15 LAB — CREAT UR-MCNC: 282 MG/DL

## 2023-09-15 PROCEDURE — 90682 RIV4 VACC RECOMBINANT DNA IM: CPT | Performed by: INTERNAL MEDICINE

## 2023-09-15 PROCEDURE — G0008 ADMIN INFLUENZA VIRUS VAC: HCPCS | Performed by: INTERNAL MEDICINE

## 2023-09-15 PROCEDURE — 99215 OFFICE O/P EST HI 40 MIN: CPT | Performed by: INTERNAL MEDICINE

## 2023-09-15 PROCEDURE — G0463 HOSPITAL OUTPT CLINIC VISIT: HCPCS | Mod: 25 | Performed by: INTERNAL MEDICINE

## 2023-09-15 PROCEDURE — 250N000011 HC RX IP 250 OP 636: Performed by: INTERNAL MEDICINE

## 2023-09-15 PROCEDURE — 83992 ASSAY FOR PHENCYCLIDINE: CPT | Performed by: INTERNAL MEDICINE

## 2023-09-15 RX ORDER — MORPHINE SULFATE 15 MG/1
15 TABLET, FILM COATED, EXTENDED RELEASE ORAL EVERY 12 HOURS
Qty: 60 TABLET | Refills: 0 | Status: SHIPPED | OUTPATIENT
Start: 2023-09-20 | End: 2023-10-23

## 2023-09-15 RX ADMIN — INFLUENZA A VIRUS A/WEST VIRGINIA/30/2022 (H1N1) RECOMBINANT HEMAGGLUTININ ANTIGEN, INFLUENZA A VIRUS A/DARWIN/6/2021 (H3N2) RECOMBINANT HEMAGGLUTININ ANTIGEN, INFLUENZA B VIRUS B/AUSTRIA/1359417/2021 RECOMBINANT HEMAGGLUTININ ANTIGEN, AND INFLUENZA B VIRUS B/PHUKET/3073/2013 RECOMBINANT HEMAGGLUTININ ANTIGEN 0.7 ML: 45; 45; 45; 45 INJECTION INTRAMUSCULAR at 12:24

## 2023-09-15 ASSESSMENT — PAIN SCALES - GENERAL: PAINLEVEL: MILD PAIN (2)

## 2023-09-15 NOTE — LETTER
"9/15/2023       RE: Wilbert Huthcins  [address]  [facility] [address]     Dear Colleague,    Thank you for referring your patient, Wilbert Hutchins, to the [facility] at [facility]. Please see a copy of my visit note below.    Palliative Care Outpatient Clinic      Patient ID:  Medical - He has ILD + undifferentiated connective tissue disease on 6 lpm (previously on 4L) O2. Not interested in lung transplant; smokes tobacco and cannabis daily. He has PTSD.      We have been following for dyspnea, Dr Oliveira started morphine-->morphineER 15mg bid Summer 2021.     Social - Lives with wife Eve. 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    Cannabis and tobacco use disorders: continues to smoke despite health consequences & ineligiblity for lung transplantation; considers his smoking as integral to his qol and is not interested in stopping.   Estranged relationships with children \"their choice, not mine.\"   Has homecare nursing and case management via [employer] program via [employer].      Care Planning - +HCD on file, names Myra as his agent. DNR/DNI POLST completed 11/2021. Hospice education completed (he is not eligible currently). He has expressed interested in assisted suicide if ever legalized. Confirms he wants DNR/DNI order 9/2023.      Opioid Safety - +Naloxone  Last UDS 3/2022 as expected      History:  History gathered today from: patient, medical chart    No major events  Saw SAWYER shankar--went well, glad he went    Dyspnea stable, no morphine concerns or side effects  He's had increased chest congestion, am coughing, feeling worse the last couple weeks  No fever        PE: There were no vitals taken for this visit.   Wt Readings from Last 3 Encounters:   06/09/23 102.1 kg (225 lb)   04/05/23 108.9 kg (240 lb)   03/07/23 105.7 kg (233 lb)     Alert NAD      Data " reviewed:  I reviewed recent labs and imaging, my comments:       database reviewed: y      Impression & Recommendations:    62 yo with ILD, chronic dyspnea, on MSContin 15 mg bid for that    No changes to morphine  Due for UDS today    Asked him to call his PCP today about his increasing congestion    We discussed EOL worries  He worries about being kept alive in a debilitated state and suffocating  We discussed EOL sx management including sedating doses of opioids if needed for patients with severe pain or dyspnea at eol, hospice care, etc.  He wishes assisted suicide was legal and would probably pursue that: not now, but he imagines a point in which he'd want that if his health were much worse.  Reviewed his POLST and DNR/DNI order and he definitely wants to keep that status      Over 40 minutes spent on the date of the encounter doing chart review, history and exam, patient education & counseling, documentation and other activities as noted above.    Thank you for involving us in the patient's care.   Jostin Peralta MD / Palliative Medicine / Text me via [employer]  This note may have been composed with voice recognition software and there may be mistranscriptions.          Again, thank you for allowing me to participate in the care of your patient.      Sincerely,    Jostin Peralta MD

## 2023-09-15 NOTE — PROGRESS NOTES
"Palliative Care Outpatient Clinic      Patient ID:  Medical - He has ILD + undifferentiated connective tissue disease on 6 lpm (previously on 4L) O2. Not interested in lung transplant; smokes tobacco and cannabis daily. He has PTSD.      We have been following for dyspnea, Dr Oliveira started morphine-->morphineER 15mg bid Summer 2021.     Social - Lives with wife Eve. 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    Cannabis and tobacco use disorders: continues to smoke despite health consequences & ineligiblity for lung transplantation; considers his smoking as integral to his qol and is not interested in stopping.   Estranged relationships with children \"their choice, not mine.\"   Has homecare nursing and case management via [employer] program via [employer].      Care Planning - +HCD on file, names Myra as his agent. DNR/DNI POLST completed 11/2021. Hospice education completed (he is not eligible currently). He has expressed interested in assisted suicide if ever legalized. Confirms he wants DNR/DNI order 9/2023.      Opioid Safety - +Naloxone  Last UDS 3/2022 as expected      History:  History gathered today from: patient, medical chart    No major events  Saw SAWYER shankar--went well, glad he went    Dyspnea stable, no morphine concerns or side effects  He's had increased chest congestion, am coughing, feeling worse the last couple weeks  No fever        PE: There were no vitals taken for this visit.   Wt Readings from Last 3 Encounters:   06/09/23 102.1 kg (225 lb)   04/05/23 108.9 kg (240 lb)   03/07/23 105.7 kg (233 lb)     Alert NAD      Data reviewed:  I reviewed recent labs and imaging, my comments:       database reviewed: y      Impression & Recommendations:    64 yo with ILD, chronic dyspnea, on MSContin 15 mg bid for that    No changes to morphine  Due for UDS today    Asked him to call his PCP today about his increasing congestion    We discussed EOL " worries  He worries about being kept alive in a debilitated state and suffocating  We discussed EOL sx management including sedating doses of opioids if needed for patients with severe pain or dyspnea at eol, hospice care, etc.  He wishes assisted suicide was legal and would probably pursue that: not now, but he imagines a point in which he'd want that if his health were much worse.  Reviewed his POLST and DNR/DNI order and he definitely wants to keep that status      Over 40 minutes spent on the date of the encounter doing chart review, history and exam, patient education & counseling, documentation and other activities as noted above.    Thank you for involving us in the patient's care.   Jostin Peralta MD / Palliative Medicine / Text me via [employer]  This note may have been composed with voice recognition software and there may be mistranscriptions.

## 2023-09-15 NOTE — NURSING NOTE
Influenza vaccine given to patient in Left Deltoid . Patient tolerated injection without any incidents.     Has the patient received the information for the injectable influenza vaccine? NO    Elan Mcnair, EMT September 15, 2023 12:28 PM

## 2023-09-15 NOTE — NURSING NOTE
"Oncology Rooming Note    September 15, 2023 11:31 AM   Wilbert Hutchins is a 63 year old male who presents for:    Chief Complaint   Patient presents with    Oncology Clinic Visit     Adjustment disorder with mixed disturbance of emotions and conduct     Initial Vitals: BP (!) 148/83 (BP Location: Right arm, Patient Position: Sitting, Cuff Size: Adult Regular)   Pulse 59   Temp 98.4  F (36.9  C) (Oral)   Resp 16   SpO2 96%  Estimated body mass index is 32.28 kg/m  as calculated from the following:    Height as of 6/9/23: 1.778 m (5' 10\").    Weight as of 6/9/23: 102.1 kg (225 lb). There is no height or weight on file to calculate BSA.  Mild Pain (2) Comment: Data Unavailable   No LMP for male patient.  Allergies reviewed: Yes  Medications reviewed: Yes    Medications: MEDICATION REFILLS NEEDED TODAY. Provider was notified.  Pharmacy name entered into untapt: [facility] [address] [place] [address]    Clinical concerns: Pt requests refill of morphine.       Jose Alejandro Dunne              "

## 2023-09-16 DIAGNOSIS — J41.1 MUCOPURULENT CHRONIC BRONCHITIS (H): ICD-10-CM

## 2023-09-16 DIAGNOSIS — J44.9 COPD, GROUP D, BY GOLD 2017 CLASSIFICATION (H): ICD-10-CM

## 2023-09-18 RX ORDER — ROFLUMILAST 500 UG/1
TABLET ORAL
Qty: 90 TABLET | Refills: 3 | Status: SHIPPED | OUTPATIENT
Start: 2023-09-18 | End: 2024-03-11

## 2023-09-20 LAB
GABAPENTIN UR QL CFM: PRESENT
HYDROMORPHONE UR CFM-MCNC: 159 NG/ML
HYDROMORPHONE/CREAT UR: 56 NG/MG {CREAT}
MORPHINE UR CFM-MCNC: >7000 NG/ML
MORPHINE/CREAT UR: ABNORMAL

## 2023-10-23 DIAGNOSIS — R06.02 SHORTNESS OF BREATH: ICD-10-CM

## 2023-10-23 DIAGNOSIS — J84.9 ILD (INTERSTITIAL LUNG DISEASE) (H): ICD-10-CM

## 2023-10-23 NOTE — TELEPHONE ENCOUNTER
Received telephone call from patient requesting refill of MS Contin.     Last refill: 9/29/2023  Last office visit: 9/15/2023  Scheduled for follow up 12/22/2023     Will route request to MD for review.     Reviewed MN  Report.

## 2023-10-24 RX ORDER — MORPHINE SULFATE 15 MG/1
15 TABLET, FILM COATED, EXTENDED RELEASE ORAL EVERY 12 HOURS
Qty: 60 TABLET | Refills: 0 | Status: SHIPPED | OUTPATIENT
Start: 2023-10-28 | End: 2023-11-24

## 2023-10-31 DIAGNOSIS — J84.9 ILD (INTERSTITIAL LUNG DISEASE) (H): ICD-10-CM

## 2023-10-31 DIAGNOSIS — J41.0 SIMPLE CHRONIC BRONCHITIS (H): ICD-10-CM

## 2023-10-31 DIAGNOSIS — J44.1 COPD EXACERBATION (H): ICD-10-CM

## 2023-10-31 DIAGNOSIS — J44.9 COPD, GROUP D, BY GOLD 2017 CLASSIFICATION (H): Primary | ICD-10-CM

## 2023-11-01 ENCOUNTER — ANCILLARY PROCEDURE (OUTPATIENT)
Dept: CT IMAGING | Facility: CLINIC | Age: 63
End: 2023-11-01
Attending: INTERNAL MEDICINE
Payer: COMMERCIAL

## 2023-11-01 DIAGNOSIS — J84.9 ILD (INTERSTITIAL LUNG DISEASE) (H): ICD-10-CM

## 2023-11-01 DIAGNOSIS — J41.0 SIMPLE CHRONIC BRONCHITIS (H): ICD-10-CM

## 2023-11-01 DIAGNOSIS — J44.9 COPD, GROUP D, BY GOLD 2017 CLASSIFICATION (H): ICD-10-CM

## 2023-11-01 DIAGNOSIS — J44.1 COPD EXACERBATION (H): ICD-10-CM

## 2023-11-01 PROCEDURE — 71250 CT THORAX DX C-: CPT | Mod: GC | Performed by: RADIOLOGY

## 2023-11-09 ENCOUNTER — ONCOLOGY VISIT (OUTPATIENT)
Dept: ONCOLOGY | Facility: CLINIC | Age: 63
End: 2023-11-09
Attending: SOCIAL WORKER
Payer: COMMERCIAL

## 2023-11-09 DIAGNOSIS — F43.25 ADJUSTMENT DISORDER WITH MIXED DISTURBANCE OF EMOTIONS AND CONDUCT: Primary | ICD-10-CM

## 2023-11-09 PROCEDURE — 90834 PSYTX W PT 45 MINUTES: CPT | Performed by: SOCIAL WORKER

## 2023-11-09 NOTE — LETTER
"    11/9/2023         RE: Wilbert Hutchins  [address]  [facility] [address]        Dear Colleague,    Thank you for referring your patient, Wilbert Hutchins, to the [facility] [place]. Please see a copy of my visit note below.    Palliative Care Clinical Social Work Return Appointment    PLEASE NOTE:  THIS IS A MENTAL HEALTH NOTE.  OTHER PROVIDERS VIEWING THIS NOTE SHOULD USE THIS ONLY FOR UNDERSTANDING THE CONTEXT OF THE PATIENT'S EXPERIENCE.  TOPICS DESCRIBED IN THIS NOTE SHOULD NOT BE REFERENCED TO THE PATIENT BY MEDICAL PROVIDERS.    Garry Hutchins is a 63 year old man with medical history that includes ILD, current tobacco and marijuana use, history of Alcohol use disorder, self reports of bipolar disorder, PTSD, and antisocial personality disorder, as well as adjustment disorder with mixed disturbance of emotions and conduct seen today at [facility] palliative care clinic  for a return psychotherapy appointment to address adjustment related difficulties as it relates to coping with illness and treatment.    Mental Status Exam:(List all that apply)      Appearance: Appropriate      Eye Contact: Good       Orientation: Yes, x4      Mood: Normal      Affect: Appropriate      Thought Content: Clear         Thought Form: Logical      Psychomotor Behavior: Normal    Visit themes:     Adjustment to Illness: Feels his ILD is progressing, he reports that recent work ups for what he thought was a sinus infection have not shown any marked change.  He was diagnosed with \"some sort of bronchial thing\" and it sounds like he is taking an antibiotic.  He does not feel his symptoms have improved.  He acknowledges some difficulty with taking his afternoon dose of the medication as prescribed because he is not used to taking medications in the afternoon, but for the last few days he has been able to remember by making a point of placing the afternoon dose by his cigarettes where he knows " "he will see it. He feels his stamina is low.      Mental Health (thoughts, feelings, actions, coping, and symptoms): \"I feel like I'm just waiting at this point\"  Continues to engage in some life review, but this is also triggering due to trauma history.  Continues to rely on  chemical coping and distraction (marijuana and television). 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  He brought up the prompt \"if I had a blog\" and I encouraged him to think about what he would write in a blog.  He expresses a desire to explore how his stories from his childhood connect to where he is now and implied that there is an element of hope that his stories might be helpful to someone else who has lived experiences similar to his.      He does have a way to do voice recordings and is open to recording some stories.       Helpful activities: endurance for activities outside of the home is low.  Went to [employer] with Eve yesterday \"but I'm paying for it today\"    Helpful cognitions:     Unhelpful and/or triggering or exacerbating factors: complex trauma history (please see my note from March 2021), limited social support    Body-Mind Skills Education:     Relationships: Complex.  Lives with significant other, Myra (today he tells me they are not legally) . He also has ongoign support from his care manager, Piyush who has known Garry for 7+ years.     End of Life and Advance Care Planning: Has a HCD and POLST on file     Main therapeutic interventions provided this session include:   Provide psychoeducation, Facilitate processing of thoughts and feelings, Facilitate structured problem solving, and life review, " trauma informed therapy, exploration of goals    Plan:  Will return for psychotherapy with palliative care focus in 6 weeks    Time spent with patient/family:  50 minutes  (Start 12:30, end 1:20)    BILL Gardner, [facility]   Palliative Care Clinical   Ph: [phone]      DO NOT SEND ANY LETTERS                Again, thank you for allowing me to participate in the care of your patient.        Sincerely,        DANTE Gardner

## 2023-11-09 NOTE — PROGRESS NOTES
"Palliative Care Clinical Social Work Return Appointment    PLEASE NOTE:  THIS IS A MENTAL HEALTH NOTE.  OTHER PROVIDERS VIEWING THIS NOTE SHOULD USE THIS ONLY FOR UNDERSTANDING THE CONTEXT OF THE PATIENT'S EXPERIENCE.  TOPICS DESCRIBED IN THIS NOTE SHOULD NOT BE REFERENCED TO THE PATIENT BY MEDICAL PROVIDERS.    Garry Hutchins is a 63 year old man with medical history that includes ILD, current tobacco and marijuana use, history of Alcohol use disorder, self reports of bipolar disorder, PTSD, and antisocial personality disorder, as well as adjustment disorder with mixed disturbance of emotions and conduct seen today at [facility] palliative care [facility]  for a return psychotherapy appointment to address adjustment related difficulties as it relates to coping with illness and treatment.    Mental Status Exam:(List all that apply)      Appearance: Appropriate      Eye Contact: Good       Orientation: Yes, x4      Mood: Normal      Affect: Appropriate      Thought Content: Clear         Thought Form: Logical      Psychomotor Behavior: Normal    Visit themes:     Adjustment to Illness: Feels his ILD is progressing, he reports that recent work ups for what he thought was a sinus infection have not shown any marked change.  He was diagnosed with \"some sort of bronchial thing\" and it sounds like he is taking an antibiotic.  He does not feel his symptoms have improved.  He acknowledges some difficulty with taking his afternoon dose of the medication as prescribed because he is not used to taking medications in the afternoon, but for the last few days he has been able to remember by making a point of placing the afternoon dose by his cigarettes where he knows he will see it. He feels his stamina is low.      Mental Health (thoughts, feelings, actions, coping, and symptoms): \"I feel like I'm just waiting at this point\"  Continues to engage in some life review, but this is also triggering due to trauma history.  Continues to " "rely on  chemical coping and distraction (marijuana and television). 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  He brought up the prompt \"if I had a blog\" and I encouraged him to think about what he would write in a blog.  He expresses a desire to explore how his stories from his childhood connect to where he is now and implied that there is an element of hope that his stories might be helpful to someone else who has lived experiences similar to his.      He does have a way to do voice recordings and is open to recording some stories.       Helpful activities: endurance for activities outside of the home is low.  Went to [employer] with Eve yesterday \"but I'm paying for it today\"    Helpful cognitions:     Unhelpful and/or triggering or exacerbating factors: complex trauma history (please see my note from March 2021), limited social support    Body-Mind Skills Education:     Relationships: Complex.  Lives with significant other, Myra (today he tells me they are not legally) . He also has ongoign support from his care manager, Piyush who has known Garry for 7+ years.     End of Life and Advance Care Planning: Has a HCD and POLST on file     Main therapeutic interventions provided this session include:   Provide psychoeducation, Facilitate processing of thoughts and feelings, Facilitate structured problem solving, and life review, trauma informed therapy, exploration of goals    Plan:  Will return for psychotherapy with palliative care focus in 6 weeks    Time spent with patient/family:  50 minutes  (Start 12:30, end 1:20)    BILL Gardner, [facility]   Palliative Care Clinical Social " Worker  Ph: [phone]      DO NOT SEND ANY LETTERS

## 2023-11-24 ENCOUNTER — TELEPHONE (OUTPATIENT)
Dept: PULMONOLOGY | Facility: CLINIC | Age: 63
End: 2023-11-24
Payer: COMMERCIAL

## 2023-11-24 DIAGNOSIS — J84.9 ILD (INTERSTITIAL LUNG DISEASE) (H): ICD-10-CM

## 2023-11-24 DIAGNOSIS — R06.02 SHORTNESS OF BREATH: ICD-10-CM

## 2023-11-24 RX ORDER — MORPHINE SULFATE 15 MG/1
15 TABLET, FILM COATED, EXTENDED RELEASE ORAL EVERY 12 HOURS
Qty: 60 TABLET | Refills: 0 | Status: SHIPPED | OUTPATIENT
Start: 2023-11-25 | End: 2023-12-22

## 2023-11-24 NOTE — TELEPHONE ENCOUNTER
Prior Authorization Retail Medication Request    Medication/Dose: Roflumilast 250mcg tabs   Diagnosis and ICD code (if different than what is on RX):    New/renewal/insurance change PA/secondary ins. PA:  Previously Tried and Failed:    Rationale:      Insurance   Primary:   Insurance ID:      Secondary (if applicable):  Insurance ID:      Pharmacy Information (if different than what is on RX)  Name:    Phone:    Fax:

## 2023-11-24 NOTE — TELEPHONE ENCOUNTER
Received voicemail from patient requesting refill of Morphine.     Last refill: 10/28/2023  Last office visit: 9/15/2023  Scheduled for follow up 12/22/2023     Will route request to MD for review.     Reviewed [place]  Report.

## 2023-11-29 NOTE — TELEPHONE ENCOUNTER
Prior Authorization Not Needed per Insurance    Medication: Roflumilast 250mcg tabs -PA NOT NEEDED   Insurance Company: [employer] Non-Specialty PA's - Phone [phone] Fax [phone]  Expected CoPay:      Pharmacy Filling the Rx: [facility] [address] [address]  Pharmacy Notified:  Yes  Patient Notified:  No    Called pharmacy and pharmacy stated that PA is Not Needed and medication is covered. Pharmacy stated that they have a paid claim on 9/19/2023 for 250 MCG dose ( which was the last fill date on medication) and patient has picked up medication. Pharmacy stated that they have a paid claim on medication on 9/25/2023 for 500 MCG dose for a 90 day supply and patient also has picked up medication. Pharmacy tried to process medication for  500 MCG and is getting a refill too soon. Next available fill date is 12/6/2023.

## 2023-12-01 ENCOUNTER — OFFICE VISIT (OUTPATIENT)
Dept: PULMONOLOGY | Facility: CLINIC | Age: 63
End: 2023-12-01
Attending: INTERNAL MEDICINE
Payer: COMMERCIAL

## 2023-12-01 ENCOUNTER — REFERRAL (OUTPATIENT)
Dept: TRANSPLANT | Facility: CLINIC | Age: 63
End: 2023-12-01

## 2023-12-01 VITALS — DIASTOLIC BLOOD PRESSURE: 77 MMHG | HEART RATE: 66 BPM | OXYGEN SATURATION: 99 % | SYSTOLIC BLOOD PRESSURE: 130 MMHG

## 2023-12-01 VITALS — BODY MASS INDEX: 33.64 KG/M2 | HEIGHT: 70 IN | WEIGHT: 235 LBS

## 2023-12-01 DIAGNOSIS — J84.9 ILD (INTERSTITIAL LUNG DISEASE) (H): ICD-10-CM

## 2023-12-01 DIAGNOSIS — J96.11 CHRONIC HYPOXEMIC RESPIRATORY FAILURE (H): ICD-10-CM

## 2023-12-01 DIAGNOSIS — M05.10 RHEUMATOID LUNG (H): Primary | ICD-10-CM

## 2023-12-01 PROCEDURE — 94375 RESPIRATORY FLOW VOLUME LOOP: CPT | Performed by: INTERNAL MEDICINE

## 2023-12-01 PROCEDURE — 99215 OFFICE O/P EST HI 40 MIN: CPT | Mod: 25 | Performed by: INTERNAL MEDICINE

## 2023-12-01 PROCEDURE — G0463 HOSPITAL OUTPT CLINIC VISIT: HCPCS | Performed by: INTERNAL MEDICINE

## 2023-12-01 PROCEDURE — 94729 DIFFUSING CAPACITY: CPT | Performed by: INTERNAL MEDICINE

## 2023-12-01 ASSESSMENT — PAIN SCALES - GENERAL: PAINLEVEL: MODERATE PAIN (4)

## 2023-12-01 NOTE — LETTER
2023         RE: Wilbert Hutchins  [address]  [facility] [address]        Dear Colleague,    Thank you for referring your patient, Wilbert Hutchins, to the [facility] [place]. Please see a copy of my visit note below.    [facility]  ILD Clinic - Return Visit -  2023         Assessment and Plan:   Wilbert Hutchins is a 63 year old male with a history of RA interstitial lung disease who presents for a return visit.       1) Rheumatoid lung, very slowly progressive   - Indeterminate for UIP in the setting of rheumatoid, DDx include DIP and RA lung (favored diagnosis)  - He continues to smoke cigarettes but down to 5 cig/day, he has tried wellbutrin in the past but suffered with side effects. Declined chantix previously, he is reducing smoking by himself and thinks he will quit by next month.   - Lung transplantation re-discussed multiple times, and he has been very reluctant as he knew a person who  within 1-2 months post lung transplant. Also smoking has been a barrier but he is making progress on reducing cigarettes. I placed lung transplant referral today after a long discussion, just to have a conversation with out transplant clinic.   - Started RTX infusions with rheumatology Dr. Silva 3/2023, hasn't noticed symptomatic improvement, in fact he is more short of breath recently, I suggest we hold further RTX infusions. I will communicate with Dr. Silva.    - His PFTs today show overall very slow progression compared to 2019. His CT today also is almost identical to late , suggesting that his marked progression in symptoms and new desaturations on 6 LPM are likely due to evolving pulmonary hypertension. He declined to do six minutes walk as he is not able to, and his maximum walking distance is 60 feet at home on 6 LPM, with SpO2 down to 84%,  hopefully if PH is diagnosed and treated he can perform a six mins walk.   - NTproBNP   - 2D Echo  - Referral to pulmonary hypertension clinic based on above   - D-dimer, and proceed with doppler LE if > 600 given leg swelling   - Return in 3 months over video     A) Supplemental oxygen  - Using portable oxygen with compliance, needs 4 LPM at rest and 6 LPM with activity.     B) Nocturnal oximetry  He is using oxygen at nighttime at 4 LPM, declined sleep referral previoulsy, had sleep study 2018, AHI was 4.     2) Continued Tobacco Use  Trying to quit gradually, declined pharmacologic options per above.     3) GOLD D COPD   Overlapping with ILD, but symptoms are consistent with mucopurulent chronic bronchitis. No symptoms of exacerbation today or infection. Gets good relief with anoro.   - Continue Anoro ellipta, 1 inhalation qAM good   - No need for ICS given current systemic prednisone  - Continue Daliresp 500 mcg po daily, experiencing symptomatic response with it     4) Immunocompromised (rituximab and prednisone)  - CBC with diff  - CMP  - Total immunoglobulin levels   - CD19 r/o persisting RTX effect (last RTX 3-4/2023)  - Recommending to hold further RTX dosing given lack of symptomatic, radiologic and PFT improvements       RTC: 3 months over video   Influenza and other vaccinations: Up to date on Prevnar. He has received the Pfizer COVID-19 vaccine, including booster doses up to 2022.    I spent 42 minutes  total today, reviewing medical records including progress notes, multiple imaging studies from his previous available records, and documentation.      Matt Suárez MD  Pulmonary and Critical Care Medicine          Problem List:     Interstitial lung disease  HRCT (May 31, 2019) Indeterminate for UIP;   Peripheral reticulation, mild paraseptal emphysema, GGO and peripheral reticulation and fibrosis basilar predominant. Mediastinal lymphadenopathy.  No air trapping on expiratory views.  Pulm: Dr. Santiago  Carter ([facility])  Rheum: Dr. Silva ([facility])  Treatment   On Prednisone since March 2018, current dose 5 mg daily   CellCept initiated in 2019 but could not tolerate due to exacerbation of arthralgias  On Plaquinil   Started RTX 3/2023, no redosing ever since, seeing Dr Silva in 3 days      Tobacco and Marijuana use     Migrating arthralgias  Positive RF (52) and Anti-CCP (76)  No erosive arthritis on right hand film (April 6, 2017)     Obesity (Body mass index is 34 kg/m ).        Interval History:     Wilbert Hutchins is a 63 year old male with interstitial lung disease who presents for a return visit.  He was last seen in by me in 12/2022.    He continues to need oxygen at 6 LPM and very short of breath with minimal activity. Needs albuterol inhaler on daily basis. Started on Anoro in late 2022 with partial relief in dyspnea. He is also toelrating daliresp 500 which was ramped up late 2022 and thinks it helps his dyspnea and cough. Cigarettes are down to an average of 5 per day. No recent hospitalizations. SpO2 dropped from 99% to 94% with PFTs while on 4 LPM. His dyspnea is significantly worse over the last 3 months, and he is noticing more frequent desaturations despite compliance with oxygen. He continues to have daily productive cough, gray sputum, which his baseline. He denies fever, sore throat, change in sputum production or symptoms to suggest exacerbation or pneumonia.            Physical Exam:   /77   Pulse 66   SpO2 99%       GENERAL: alert, NAD  HEENT: NCAT, EOMI, masked  Lungs: decreased air movement, pan-inspiratory crackles more prominent at bases  CV: RRR, S1S2, no murmurs noted  Neuro: AAO X 3  Psychiatric: normal affect, good eye contact  Skin: no rash, jaundice or lesions on limited exam  Extremities: No cyanosis, clubbing, trace edema.         Imaging:     HRCT chest images from 9/15/22 reviewed and compared to 9/2021  Agreed with radiology:  IMPRESSION:   Stable fibrotic changes in the lungs consistent with  interstitial lung disease alternative diagnosis to UIP per ATS  criteria. Possibly CPFE or sequela of DIP. No acute airspace  consolidation to suggest active infection.    Reviewed HRCT chest images 11/1/23, agreed with radiology:   Lungs: Upper lobe predominant paraseptal emphysema. Unchanged  bibasilar subpleural predominant reticular opacities and traction  bronchiectasis. No honeycombing. No air trapping or mosaic  attenuation. Stable pulmonary nodule including 6 mm right upper lobe  nodule on the right minor fissure series 3 image 135. Scattered  calcified granulomas.                                                                  Impression: Unchanged fibrotic interstitial lung disease since 2022.  Probable usual interstitial pneumonia           Pulmonary Function Tests:       My interpretation: Mild restriction, severe reduction in diffusion capacity that is not significantly different compared to PFTs in 2022 and 2023.                   Cardiac:     TTE (11/24/2020)  Interpretation Summary  Global and regional left ventricular function is normal with an EF of 60-65%.  Global right ventricular function is normal.  There is late appearance of bubbles in the left heart on the bubble study. A small patent foramen ovale cannot be excluded.  No significant valvular abnormalities present.  The inferior vena cava was normal in size with preserved respiratory variability.  No pericardial effusion is present.    Transthoracic echocardiogram (July 19, 2019)  Global and regional left ventricular function is normal with an EF of 60-65%.  Right ventricular function, chamber size, wall motion, and thickness are  Normal.   The atrial septum is intact as assessed by color Doppler and agitated saline  bubble study.  Pulmonary artery systolic pressure cannot be assessed.  The inferior vena cava is normal.  No pericardial effusion is present.  *PA acceleration time reported  as 130msec (normal to borderline)     Stress echo (March 22, 2017)  1. No ischemia at the cardiac workload achieved.  2. Left ventricular function normal at rest, improved with stress.  3. No ischemic ECG response with adequate heart rate.  4. The patient did not report angina with stress.  5. Exercise capacity was good .  6. The baseline echocardiogram revealed no significant valvular  abnormalities. There is focal non specific change of the aortic valve.  7. Normal blood pressure response with exercise.        Matt Suárez MD

## 2023-12-01 NOTE — PATIENT INSTRUCTIONS
Echo will be scheduled for you to assess the right side of the heart, and we will refer you to cardiology accordingly to explore options for treatment of pulmonary hypertension     Lung transplant referral was placed for you, to have a discussion and explore this potential option       Please call our direct ILD nurses line for questions and concerns: [phone]    For scheduling, please call: [phone]

## 2023-12-01 NOTE — LETTER
Wilbert Hutchins  [address]  [facility] [address]    Dear Wilbert,    Thank you for your interest in the Transplant Center at [facility]. We look forward to being a part of your care team and assisting you through the transplant process.    As we discussed, your transplant coordinator is Keiry Hayes (Lung).  You may call your coordinator at any time with questions or concerns.  Your first scheduled call will be on 12/06/2023 between 10 am- 12 pm.  If this needs to change, call [phone].    Please complete the following.    Fill out and return the enclosed forms  Authorization for Electronic Communication  Authorization to Discuss Protected Health Information  Authorization for Release of Protected Health Information    Sign up for:  fitogramt, access to your electronic medical record (see enclosed pamphlet)  [employer], a transplant education website       My Transplant Place   You can use these tools to learn more about your transplant, communicate with your care team, and track your medical details  Sincerely,  Solid Organ Transplant  [facility]    cc: Referring Physician PCP

## 2023-12-01 NOTE — LETTER
February 29, 2024    From:  [facility]    To: Mr. Hutchins  [address]  [facility] [address]    Dear Wilbert Hutchins,    Thank you for considering the [facility] Lung Transplant Program. Dr. Matt Suárez referred you to our program for consideration of lung transplant evaluation. This letter is to serve as a formal decline following the discussion you had on 12/6/2024 with your transplant care coordinator.     We have reviewed your records and, unfortunately, you do not meet our criteria for lung transplant evaluation.  Although your lung disease may be severe enough to consider transplantation as an option, our opinion is that the following condition(s) would put your health and survival at great risk after lung transplantation.     At this time you are still using nicotine products (including smoking, chewing tobacco, nicotine gum, nicotine patch, or e-cigarettes) or have used them recently.  We require candidates to be six months free of nicotine products to be evaluated for lung transplantation.  When you have achieved at least four months of being nicotine-free please call us to schedule an appointment for consultation.  Smoking after lung transplantation is not safe, so we must insist that potential lung recipients are completely nicotine-free beforehand.  If you need help to quit smoking-please talk to your Primary Care Provider about what resources would be best for you.    Though you do not meet our program s criteria for lung transplantation, we will be glad to meet with you in our clinic to explore other treatment options for your lung disease.        Thank you for considering the [facility] Lung Transplant Program for your health care needs. Please feel free to contact us at [phone] if you or your physician would like to discuss our decision or with any concerns.     Sincerely,    The [facility] Lung Transplant  Program    CC Primary Care Provider        Referring Provider

## 2023-12-01 NOTE — NURSING NOTE
Chief Complaint   Patient presents with    RECHECK     Follow up test result and concerns of shortness of breath increasing   /77   Pulse 66   SpO2 99%     Laya Rico CMA at 9:50 AM on 12/1/2023.

## 2023-12-01 NOTE — TELEPHONE ENCOUNTER
December 1, 2023 3:55 PM - Catherine Nogueira, LPN:     SOT LUNG INTAKE    Intake completed with   December 1, 2023    [facility]    Referring Provider: Dr. Matt Suárez   Primary Care Provider: Dr. Michelle Tipton  Specialist: Rheumatologist- Dr. Ever Silva with [facility]   Source/Facility:Internal   Diagnosis: Rheumatoid Lung, ILD, chronic hypoxemic respiratory failure    Critical History     Smoking/nicotine use history: in the process of quitting, down to 3-5 cigarettes daily.   Alcohol use history: Sober since 2013  Drug use history: Smoke marijuana from 13yo-present for anxiety   Cancer history: None   Cardiac history: None   BMI: 33.72  Oxygen use at rest: 5L with activity: 6-7L      Comments    Is patient in a group home/assisted living? No  Does patient have a guardian? No    Referral intake process completed.  Patient is aware that after financial approval is received, medical records will be requested.   Patient confirmed for a callback from transplant coordinator on 12/6/2023. (within 2 weeks)      Patient verbal consent given to access medical records and documents outside of [facility] through [facility]. Yes    Confirmed coordinator will discuss evaluation process in more detail at the time of their call.   Patient is aware of the need to arrange age appropriate cancer screening, vaccinations, and dental care.Yes   Reminded patient to complete questionnaire, complete medical records release, and review packet prior to evaluation visit . Yes   Assessed patient for special needs (ie--wheelchair, assistance, guardian, and ):  Requires Electric scooter, hearing aids, and 5-7L of 02    Patient instructed to call [phone] with questions. Yes

## 2023-12-01 NOTE — PROGRESS NOTES
[facility]  ILD Clinic - Return Visit -  2023         Assessment and Plan:   Wilbert Hutchins is a 63 year old male with a history of RA interstitial lung disease who presents for a return visit.       1) Rheumatoid lung, very slowly progressive   - Indeterminate for UIP in the setting of rheumatoid, DDx include DIP and RA lung (favored diagnosis)  - He continues to smoke cigarettes but down to 5 cig/day, he has tried wellbutrin in the past but suffered with side effects. Declined chantix previously, he is reducing smoking by himself and thinks he will quit by next month.   - Lung transplantation re-discussed multiple times, and he has been very reluctant as he knew a person who  within 1-2 months post lung transplant. Also smoking has been a barrier but he is making progress on reducing cigarettes. I placed lung transplant referral today after a long discussion, just to have a conversation with out transplant clinic.   - Started RTX infusions with rheumatology Dr. Silva 3/2023, hasn't noticed symptomatic improvement, in fact he is more short of breath recently, I suggest we hold further RTX infusions. I will communicate with Dr. Silva.    - His PFTs today show overall very slow progression compared to 2019. His CT today also is almost identical to late , suggesting that his marked progression in symptoms and new desaturations on 6 LPM are likely due to evolving pulmonary hypertension. He declined to do six minutes walk as he is not able to, and his maximum walking distance is 60 feet at home on 6 LPM, with SpO2 down to 84%, hopefully if PH is diagnosed and treated he can perform a six mins walk.   - NTproBNP   - 2D Echo  - Referral to pulmonary hypertension clinic based on above   - D-dimer, and proceed with doppler LE if > 600 given leg swelling   - Return in 3 months over video     A) Supplemental oxygen  - Using portable oxygen  with compliance, needs 4 LPM at rest and 6 LPM with activity.     B) Nocturnal oximetry  He is using oxygen at nighttime at 4 LPM, declined sleep referral previoulsy, had sleep study 2018, AHI was 4.     2) Continued Tobacco Use  Trying to quit gradually, declined pharmacologic options per above.     3) GOLD D COPD   Overlapping with ILD, but symptoms are consistent with mucopurulent chronic bronchitis. No symptoms of exacerbation today or infection. Gets good relief with anoro.   - Continue Anoro ellipta, 1 inhalation qAM good   - No need for ICS given current systemic prednisone  - Continue Daliresp 500 mcg po daily, experiencing symptomatic response with it     4) Immunocompromised (rituximab and prednisone)  - CBC with diff  - CMP  - Total immunoglobulin levels   - CD19 r/o persisting RTX effect (last RTX 3-4/2023)  - Recommending to hold further RTX dosing given lack of symptomatic, radiologic and PFT improvements       RTC: 3 months over video   Influenza and other vaccinations: Up to date on Prevnar. He has received the Pfizer COVID-19 vaccine, including booster doses up to 2022.    I spent 42 minutes  total today, reviewing medical records including progress notes, multiple imaging studies from his previous available records, and documentation.      Matt Suáerz MD  Pulmonary and Critical Care Medicine          Problem List:     Interstitial lung disease  HRCT (May 31, 2019) Indeterminate for UIP;   Peripheral reticulation, mild paraseptal emphysema, GGO and peripheral reticulation and fibrosis basilar predominant. Mediastinal lymphadenopathy.  No air trapping on expiratory views.  Pulm: Dr. Jack Machado ([facility])  Rheum: Dr. Silva ([facility])  Treatment   On Prednisone since March 2018, current dose 5 mg daily   CellCept initiated in 2019 but could not tolerate due to exacerbation of arthralgias  On Plaquinil   Started RTX 3/2023, no redosing ever since, seeing Dr Silva in 3 days       Tobacco and Marijuana use     Migrating arthralgias  Positive RF (52) and Anti-CCP (76)  No erosive arthritis on right hand film (April 6, 2017)     Obesity (Body mass index is 34 kg/m ).        Interval History:     Wilbert Hutchins is a 63 year old male with interstitial lung disease who presents for a return visit.  He was last seen in by me in 12/2022.    He continues to need oxygen at 6 LPM and very short of breath with minimal activity. Needs albuterol inhaler on daily basis. Started on Anoro in late 2022 with partial relief in dyspnea. He is also toelrating daliresp 500 which was ramped up late 2022 and thinks it helps his dyspnea and cough. Cigarettes are down to an average of 5 per day. No recent hospitalizations. SpO2 dropped from 99% to 94% with PFTs while on 4 LPM. His dyspnea is significantly worse over the last 3 months, and he is noticing more frequent desaturations despite compliance with oxygen. He continues to have daily productive cough, gray sputum, which his baseline. He denies fever, sore throat, change in sputum production or symptoms to suggest exacerbation or pneumonia.            Physical Exam:   /77   Pulse 66   SpO2 99%       GENERAL: alert, NAD  HEENT: NCAT, EOMI, masked  Lungs: decreased air movement, pan-inspiratory crackles more prominent at bases  CV: RRR, S1S2, no murmurs noted  Neuro: AAO X 3  Psychiatric: normal affect, good eye contact  Skin: no rash, jaundice or lesions on limited exam  Extremities: No cyanosis, clubbing, trace edema.         Imaging:     HRCT chest images from 9/15/22 reviewed and compared to 9/2021  Agreed with radiology:  IMPRESSION:  Stable fibrotic changes in the lungs consistent with  interstitial lung disease alternative diagnosis to UIP per ATS  criteria. Possibly CPFE or sequela of DIP. No acute airspace  consolidation to suggest active infection.    Reviewed HRCT chest images 11/1/23, agreed with radiology:   Lungs: Upper lobe  predominant paraseptal emphysema. Unchanged  bibasilar subpleural predominant reticular opacities and traction  bronchiectasis. No honeycombing. No air trapping or mosaic  attenuation. Stable pulmonary nodule including 6 mm right upper lobe  nodule on the right minor fissure series 3 image 135. Scattered  calcified granulomas.                                                                  Impression: Unchanged fibrotic interstitial lung disease since 2022.  Probable usual interstitial pneumonia           Pulmonary Function Tests:       My interpretation: Mild restriction, severe reduction in diffusion capacity that is not significantly different compared to PFTs in 2022 and 2023.                   Cardiac:     TTE (11/24/2020)  Interpretation Summary  Global and regional left ventricular function is normal with an EF of 60-65%.  Global right ventricular function is normal.  There is late appearance of bubbles in the left heart on the bubble study. A small patent foramen ovale cannot be excluded.  No significant valvular abnormalities present.  The inferior vena cava was normal in size with preserved respiratory variability.  No pericardial effusion is present.    Transthoracic echocardiogram (July 19, 2019)  Global and regional left ventricular function is normal with an EF of 60-65%.  Right ventricular function, chamber size, wall motion, and thickness are  Normal.   The atrial septum is intact as assessed by color Doppler and agitated saline  bubble study.  Pulmonary artery systolic pressure cannot be assessed.  The inferior vena cava is normal.  No pericardial effusion is present.  *PA acceleration time reported as 130msec (normal to borderline)     Stress echo (March 22, 2017)  1. No ischemia at the cardiac workload achieved.  2. Left ventricular function normal at rest, improved with stress.  3. No ischemic ECG response with adequate heart rate.  4. The patient did not report angina with stress.  5. Exercise  capacity was good .  6. The baseline echocardiogram revealed no significant valvular  abnormalities. There is focal non specific change of the aortic valve.  7. Normal blood pressure response with exercise.

## 2023-12-02 LAB
DLCOUNC-%PRED-PRE: 34 %
DLCOUNC-PRE: 9.5 ML/MIN/MMHG
DLCOUNC-PRED: 27.3 ML/MIN/MMHG
ERV-%PRED-PRE: 80 %
ERV-PRE: 1.25 L
ERV-PRED: 1.56 L
EXPTIME-PRE: 5.74 SEC
FEF2575-%PRED-PRE: 63 %
FEF2575-PRE: 1.75 L/SEC
FEF2575-PRED: 2.74 L/SEC
FEFMAX-%PRED-PRE: 69 %
FEFMAX-PRE: 6.28 L/SEC
FEFMAX-PRED: 9.09 L/SEC
FEV1-%PRED-PRE: 70 %
FEV1-PRE: 2.3 L
FEV1FEV6-PRE: 75 %
FEV1FEV6-PRED: 79 %
FEV1FVC-PRE: 75 %
FEV1FVC-PRED: 78 %
FEV1SVC-PRE: 88 %
FEV1SVC-PRED: 74 %
FIFMAX-PRE: 5.74 L/SEC
FVC-%PRED-PRE: 73 %
FVC-PRE: 3.08 L
FVC-PRED: 4.21 L
IC-%PRED-PRE: 44 %
IC-PRE: 1.38 L
IC-PRED: 3.11 L
VA-%PRED-PRE: 57 %
VA-PRE: 3.73 L
VC-%PRED-PRE: 59 %
VC-PRE: 2.63 L
VC-PRED: 4.43 L
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
## 2023-12-07 ENCOUNTER — ANCILLARY PROCEDURE (OUTPATIENT)
Dept: CARDIOLOGY | Facility: CLINIC | Age: 63
End: 2023-12-07
Attending: INTERNAL MEDICINE
Payer: COMMERCIAL

## 2023-12-07 DIAGNOSIS — J84.9 ILD (INTERSTITIAL LUNG DISEASE) (H): ICD-10-CM

## 2023-12-07 DIAGNOSIS — M05.10 RHEUMATOID LUNG (H): ICD-10-CM

## 2023-12-07 LAB — LVEF ECHO: NORMAL

## 2023-12-07 PROCEDURE — 93306 TTE W/DOPPLER COMPLETE: CPT | Performed by: INTERNAL MEDICINE

## 2023-12-13 ENCOUNTER — TELEPHONE (OUTPATIENT)
Dept: PULMONOLOGY | Facility: CLINIC | Age: 63
End: 2023-12-13
Payer: COMMERCIAL

## 2023-12-13 NOTE — TELEPHONE ENCOUNTER
[facility] Call Center    Phone Message    May a detailed message be left on voicemail: yes     Reason for Call: Elidia from [employer] is calling to check to see if Dr Suárez has received the forms that they faxed over for oxygen equipment.     Action Taken: Message routed to:  [facility]): Pulm     Travel Screening: Not Applicable

## 2023-12-14 DIAGNOSIS — R09.02 HYPOXIA: ICD-10-CM

## 2023-12-14 DIAGNOSIS — J84.9 ILD (INTERSTITIAL LUNG DISEASE) (H): Primary | ICD-10-CM

## 2023-12-14 NOTE — TELEPHONE ENCOUNTER
Returned call to [employer]  Unable to locate form  Gave [employer] new fax number to send form to  Will watch for form    Janis Curran RN

## 2023-12-18 ENCOUNTER — TELEPHONE (OUTPATIENT)
Dept: PULMONOLOGY | Facility: CLINIC | Age: 63
End: 2023-12-18
Payer: COMMERCIAL

## 2023-12-18 NOTE — TELEPHONE ENCOUNTER
[facility] Call Center    Phone Message    May a detailed message be left on voicemail: yes     Reason for Call: Other: Please refax a fully completed form to [facility] section B letter c filled out and Section B# 6 should be left blank needs to be filled out and faxed to [phone]      Action Taken: Other: Pulm    Travel Screening: Not Applicable

## 2023-12-20 NOTE — TELEPHONE ENCOUNTER
Per Elidia states they were wanting to get an update on if the form was received. Elidia is also wanting to know if these corrections have been made. Elidia states the form was also missing the test date and would need that added onto the form. Elidia would like to get a call back to get an update. Elidia would like the form to be faxed to [phone] if these corrections have been made. Please advise.

## 2023-12-21 ENCOUNTER — TELEPHONE (OUTPATIENT)
Dept: ONCOLOGY | Facility: CLINIC | Age: 63
End: 2023-12-21
Payer: COMMERCIAL

## 2023-12-21 NOTE — TELEPHONE ENCOUNTER
Call placed to [employer] to clarify request  [employer] did not realize how much oxygen patient was requiring  [employer] will fax a new form stating 6L  Provider in clinic 12/27 to sign new form    Janis Curran RN

## 2023-12-21 NOTE — TELEPHONE ENCOUNTER
LVM with patient phone and called the wife to tell her we needed to reschedule patients appt. Cari AMEZCUA Is out today.

## 2023-12-22 ENCOUNTER — ONCOLOGY VISIT (OUTPATIENT)
Dept: PALLIATIVE CARE | Facility: CLINIC | Age: 63
End: 2023-12-22
Attending: INTERNAL MEDICINE
Payer: COMMERCIAL

## 2023-12-22 VITALS
HEIGHT: 70 IN | HEART RATE: 68 BPM | OXYGEN SATURATION: 95 % | SYSTOLIC BLOOD PRESSURE: 122 MMHG | BODY MASS INDEX: 32.93 KG/M2 | DIASTOLIC BLOOD PRESSURE: 83 MMHG | TEMPERATURE: 98.6 F | RESPIRATION RATE: 18 BRPM | WEIGHT: 230 LBS

## 2023-12-22 DIAGNOSIS — R06.02 SHORTNESS OF BREATH: ICD-10-CM

## 2023-12-22 DIAGNOSIS — Z71.89 ADVANCE CARE PLANNING: ICD-10-CM

## 2023-12-22 DIAGNOSIS — J84.9 ILD (INTERSTITIAL LUNG DISEASE) (H): Primary | ICD-10-CM

## 2023-12-22 PROCEDURE — G0463 HOSPITAL OUTPT CLINIC VISIT: HCPCS | Performed by: INTERNAL MEDICINE

## 2023-12-22 PROCEDURE — 99215 OFFICE O/P EST HI 40 MIN: CPT | Performed by: INTERNAL MEDICINE

## 2023-12-22 RX ORDER — MORPHINE SULFATE 15 MG/1
15 TABLET, FILM COATED, EXTENDED RELEASE ORAL EVERY 12 HOURS
Qty: 60 TABLET | Refills: 0 | Status: SHIPPED | OUTPATIENT
Start: 2023-12-22 | End: 2024-01-22

## 2023-12-22 ASSESSMENT — PAIN SCALES - GENERAL: PAINLEVEL: MILD PAIN (3)

## 2023-12-22 NOTE — NURSING NOTE
"Oncology Rooming Note    December 22, 2023 11:15 AM   Wilbert Hutchins is a 63 year old male who presents for:    Chief Complaint   Patient presents with    Oncology Clinic Visit     [facility] RETURN - ILD     Initial Vitals: /83 (BP Location: Right arm, Patient Position: Chair, Cuff Size: Adult Large)   Pulse 68   Temp 98.6  F (37  C) (Oral)   Resp 18   Ht 1.778 m (5' 10\")   Wt 104.3 kg (230 lb)   SpO2 95%   BMI 33.00 kg/m   Estimated body mass index is 33 kg/m  as calculated from the following:    Height as of this encounter: 1.778 m (5' 10\").    Weight as of this encounter: 104.3 kg (230 lb). Body surface area is 2.27 meters squared.  Mild Pain (3) Comment: Data Unavailable   No LMP for male patient.  Allergies reviewed: Yes  Medications reviewed: Yes    Medications: Medication refills not needed today.  Pharmacy name entered into [employer]: [facility] [address] [place] [address]    Frailty Screening:   Is the patient here for a new oncology consult visit in cancer care? 2. No      Mart Cruz LPN              "
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
## 2023-12-22 NOTE — PROGRESS NOTES
"Palliative Care Outpatient Clinic      Patient ID:  Medical - He has ILD, chronic bronchitis, and undifferentiated connective tissue disease on 6 lpm (previously on 4L) O2. Not interested in lung transplant; smokes tobacco and cannabis daily. He has PTSD.      We have been following for dyspnea, Dr Oliveira started morphine-->morphineER 15mg bid Summer 2021.    Folows with R Aracely LICSW     Social - Lives with wife Eve. 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    Cannabis and tobacco use disorders: continues to smoke despite health consequences & ineligiblity for lung transplantation; considers his smoking as integral to his qol and is not interested in stopping. He has turned down transplant eval at least twice.  Estranged relationships with children \"their choice, not mine.\"   Has homecare nursing and case management via [employer] program via [employer].      Care Planning - +[facility] on file, names Myra as his agent. DNR/DNI POLST completed 11/2021. Hospice education completed (he is not eligible currently). He has expressed interested in assisted suicide if ever legalized. Confirms he wants DNR/DNI order 9/2023. KNows he could die at anytime, may live for years.     Opioid Safety - +Naloxone  Last UDS 3/2022 as expected      History:  History gathered today from: patient, medical chart    No major health changes  HALL probably a little worse  Dr Suárez and thought again about transplant but has decided pursuing anything; stopping smoking, losing weight, doing rehab--all those seem overwhelming to face and the risks and at times modest (to him) outcomes with transplant aren't just worth it to him. This was his conclusion a few years ago too when txp was last considered. Dr Suárez told him now was probably the last window to even consider one and that he was probably in his last 1-3 years of life. Garry feels he accpets that and is at peace with it.    No changes with " "morphine, side effects  Feels it helps a little  Mood ok    PE: /83 (BP Location: Right arm, Patient Position: Chair, Cuff Size: Adult Large)   Pulse 68   Temp 98.6  F (37  C) (Oral)   Resp 18   Ht 1.778 m (5' 10\")   Wt 104.3 kg (230 lb)   SpO2 95%   BMI 33.00 kg/m     Wt Readings from Last 3 Encounters:   12/22/23 104.3 kg (230 lb)   12/01/23 106.6 kg (235 lb)   06/09/23 102.1 kg (225 lb)     Alert NAD      Data reviewed:  I reviewed recent labs and imaging, my comments:  Echo ~nl  No signs of pulm HTN    PFT  IMPRESSION: The symmetric decrease in the FEV1 and FVC may represent restrictive lung physiology v. obstruction with air trapping. Lung volumes would be necessary to confirm if clinically required. Severe diffusion defect.      database reviewed: y      Impression & Recommendations:  62 yo with severe hypoxemic respiratory failure 2/2 ILD and chronic bronchitis; on chronic opioids for dyspnea 15 mg bid MS Contin.  As above, no changes rec'd today.  He reconsidered transplant eval again and seems at peace with his decision to not pursue it as above. This has been his long-standing position. We talked about this and prognosis at length today    Follow-up 3mo      Over 40  minutes spent on the date of the encounter doing chart review, history and exam, patient education & counseling, documentation and other activities as noted above.    Thank you for involving us in the patient's care.   Jostin Peralta MD / Palliative Medicine / Text me via [employer]  This note may have been composed with voice recognition software and there may be mistranscriptions.    "

## 2023-12-28 DIAGNOSIS — J44.9 COPD, GROUP D, BY GOLD 2017 CLASSIFICATION (H): ICD-10-CM

## 2023-12-29 RX ORDER — UMECLIDINIUM BROMIDE AND VILANTEROL TRIFENATATE 62.5; 25 UG/1; UG/1
1 POWDER RESPIRATORY (INHALATION) DAILY
Qty: 60 EACH | Refills: 0 | Status: SHIPPED | OUTPATIENT
Start: 2023-12-29 | End: 2024-02-01

## 2024-01-22 DIAGNOSIS — J84.9 ILD (INTERSTITIAL LUNG DISEASE) (H): ICD-10-CM

## 2024-01-22 DIAGNOSIS — R06.02 SHORTNESS OF BREATH: ICD-10-CM

## 2024-01-22 RX ORDER — MORPHINE SULFATE 15 MG/1
15 TABLET, FILM COATED, EXTENDED RELEASE ORAL EVERY 12 HOURS
Qty: 60 TABLET | Refills: 0 | Status: SHIPPED | OUTPATIENT
Start: 2024-01-26 | End: 2024-02-26

## 2024-01-22 NOTE — TELEPHONE ENCOUNTER
Received telephone call from patient requesting refill of MS Contin.     Last refill: 12/29/2023  Last office visit: 12/22/2023  Scheduled for follow up 3/22/2024    Will route request to MD for review.     Reviewed [place]  Report.

## 2024-02-01 ENCOUNTER — ONCOLOGY VISIT (OUTPATIENT)
Dept: ONCOLOGY | Facility: CLINIC | Age: 64
End: 2024-02-01
Attending: NURSE PRACTITIONER
Payer: COMMERCIAL

## 2024-02-01 DIAGNOSIS — F43.25 ADJUSTMENT DISORDER WITH MIXED DISTURBANCE OF EMOTIONS AND CONDUCT: Primary | ICD-10-CM

## 2024-02-01 DIAGNOSIS — Z51.5 ENCOUNTER FOR PALLIATIVE CARE: ICD-10-CM

## 2024-02-01 DIAGNOSIS — J44.9 COPD, GROUP D, BY GOLD 2017 CLASSIFICATION (H): ICD-10-CM

## 2024-02-01 PROCEDURE — 90837 PSYTX W PT 60 MINUTES: CPT | Performed by: SOCIAL WORKER

## 2024-02-01 RX ORDER — UMECLIDINIUM BROMIDE AND VILANTEROL TRIFENATATE 62.5; 25 UG/1; UG/1
1 POWDER RESPIRATORY (INHALATION) DAILY
Qty: 60 EACH | Refills: 2 | Status: SHIPPED | OUTPATIENT
Start: 2024-02-01 | End: 2024-03-07

## 2024-02-01 NOTE — PROGRESS NOTES
"Palliative Care Clinical Social Work Return Appointment    PLEASE NOTE:  THIS IS A MENTAL HEALTH NOTE.  OTHER PROVIDERS VIEWING THIS NOTE SHOULD USE THIS ONLY FOR UNDERSTANDING THE CONTEXT OF THE PATIENT'S EXPERIENCE.  TOPICS DESCRIBED IN THIS NOTE SHOULD NOT BE REFERENCED TO THE PATIENT BY MEDICAL PROVIDERS.    Garry Hutchins is a 63 year old man with medical history that includes ILD, current tobacco and marijuana use, history of Alcohol use disorder, self reports of bipolar disorder, PTSD, and antisocial personality disorder, as well as adjustment disorder with mixed disturbance of emotions and conduct seen today at [facility] palliative care [facility]  for a return psychotherapy appointment to address adjustment related difficulties as it relates to coping with illness and treatment.    Mental Status Exam:(List all that apply)      Appearance: Appropriate      Eye Contact: Good       Orientation: Yes, x4      Mood: Normal      Affect: Appropriate      Thought Content: Clear         Thought Form: Logical      Psychomotor Behavior: Normal    Visit themes:     Adjustment to Illness: Feels his ILD is progressing, feels shortness of breath is worse \"I think I'm at the start of the downward spiral\";  low endurance, more difficult to go out with O2.      Mental Health (thoughts, feelings, actions, coping, and symptoms):     Some strain in relationship with partner Eev which is not of recent onset. Overall their routine remains consistent.     Garry spends most of his day smoking and watching news \"Im just watching the world burn, can't do anything about it\".     Does not want to do any online / blog work, but Eve did get him a journal and he is thinking about starting to write on his birthday    Helpful activities: endurance for activities outside of the home is low. COntinues to smoke nicotine and marijuana. Was part of an online poker group, but it sounds like the group is smaller than it used to be and Garry does " not find it to be as satisfying.   Helpful cognitions:     Unhelpful and/or triggering or exacerbating factors: complex trauma history (please see my note from March 2021), limited social support    Body-Mind Skills Education:     Relationships: Complex.  Lives with significant other, Myra (today he tells me they are not legally) . He also has ongoign support from his care manager, Piyush who has known Garry for 7+ years.     End of Life and Advance Care Planning: Has a HCD and POLST on file     Main therapeutic interventions provided this session include:   Provide psychoeducation, Facilitate processing of thoughts and feelings, Facilitate structured problem solving, and life review, trauma informed therapy, exploration of goals    Plan:  I have asked the belinda to add him to my schedule in March and April for follow up.  He will see Dr. Peralta in late March.     Time spent with patient/family:  57 minutes  (Start 12:30, end 1:27)    BILL Gardner, [facility]   Palliative Care Clinical   Ph: [phone]      DO NOT SEND ANY LETTERS

## 2024-02-01 NOTE — LETTER
"    2/1/2024         RE: Wilbert Hutchins  [address]  [place] [facility] [address]        Dear Colleague,    Thank you for referring your patient, Wilbert Hutchins, to the [facility] [place]. Please see a copy of my visit note below.    Palliative Care Clinical Social Work Return Appointment    PLEASE NOTE:  THIS IS A MENTAL HEALTH NOTE.  OTHER PROVIDERS VIEWING THIS NOTE SHOULD USE THIS ONLY FOR UNDERSTANDING THE CONTEXT OF THE PATIENT'S EXPERIENCE.  TOPICS DESCRIBED IN THIS NOTE SHOULD NOT BE REFERENCED TO THE PATIENT BY MEDICAL PROVIDERS.    Garry Hutchins is a 63 year old man with medical history that includes ILD, current tobacco and marijuana use, history of Alcohol use disorder, self reports of bipolar disorder, PTSD, and antisocial personality disorder, as well as adjustment disorder with mixed disturbance of emotions and conduct seen today at [facility] palliative care clinic  for a return psychotherapy appointment to address adjustment related difficulties as it relates to coping with illness and treatment.    Mental Status Exam:(List all that apply)      Appearance: Appropriate      Eye Contact: Good       Orientation: Yes, x4      Mood: Normal      Affect: Appropriate      Thought Content: Clear         Thought Form: Logical      Psychomotor Behavior: Normal    Visit themes:     Adjustment to Illness: Feels his ILD is progressing, feels shortness of breath is worse \"I think I'm at the start of the downward spiral\";  low endurance, more difficult to go out with O2.      Mental Health (thoughts, feelings, actions, coping, and symptoms):     Some strain in relationship with partner Eve which is not of recent onset. Overall their routine remains consistent.     Garry spends most of his day smoking and watching news \"Im just watching the world burn, can't do anything about it\".     Does not want to do any online / blog work, but Eve did get him a journal and he is " thinking about starting to write on his birthday    Helpful activities: endurance for activities outside of the home is low. COntinues to smoke nicotine and marijuana. Was part of an online poker group, but it sounds like the group is smaller than it used to be and Garry does not find it to be as satisfying.   Helpful cognitions:     Unhelpful and/or triggering or exacerbating factors: complex trauma history (please see my note from March 2021), limited social support    Body-Mind Skills Education:     Relationships: Complex.  Lives with significant other, Myra (today he tells me they are not legally) . He also has ongoign support from his care manager, Piyush who has known Garry for 7+ years.     End of Life and Advance Care Planning: Has a HCD and POLST on file     Main therapeutic interventions provided this session include:   Provide psychoeducation, Facilitate processing of thoughts and feelings, Facilitate structured problem solving, and life review, trauma informed therapy, exploration of goals    Plan:  I have asked the belinda to add him to my schedule in March and April for follow up.  He will see Dr. Peralta in late March.     Time spent with patient/family:  57 minutes  (Start 12:30, end 1:27)    BILL Gardner, [facility]   Palliative Care Clinical   Ph: [phone]      DO NOT SEND ANY LETTERS                Again, thank you for allowing me to participate in the care of your patient.        Sincerely,        DANTE Gardner

## 2024-02-26 DIAGNOSIS — J84.9 ILD (INTERSTITIAL LUNG DISEASE) (H): ICD-10-CM

## 2024-02-26 DIAGNOSIS — R06.02 SHORTNESS OF BREATH: ICD-10-CM

## 2024-02-26 RX ORDER — MORPHINE SULFATE 15 MG/1
15 TABLET, FILM COATED, EXTENDED RELEASE ORAL EVERY 12 HOURS
Qty: 60 TABLET | Refills: 0 | OUTPATIENT
Start: 2024-02-26

## 2024-02-26 RX ORDER — MORPHINE SULFATE 15 MG/1
15 TABLET, FILM COATED, EXTENDED RELEASE ORAL EVERY 12 HOURS
Qty: 60 TABLET | Refills: 0 | Status: SHIPPED | OUTPATIENT
Start: 2024-02-26 | End: 2024-03-22

## 2024-02-26 NOTE — TELEPHONE ENCOUNTER
Received voicemail from patient requesting refill of MS Contin.     Last refill: 1/26/2024  Last office visit: 12/22/2023  Scheduled for follow up 3/22/2024    Will route request to MD for review.     Reviewed [place]  Report.

## 2024-03-07 ENCOUNTER — OFFICE VISIT (OUTPATIENT)
Dept: PULMONOLOGY | Facility: CLINIC | Age: 64
End: 2024-03-07
Attending: INTERNAL MEDICINE
Payer: COMMERCIAL

## 2024-03-07 VITALS — SYSTOLIC BLOOD PRESSURE: 136 MMHG | DIASTOLIC BLOOD PRESSURE: 83 MMHG | HEART RATE: 62 BPM | OXYGEN SATURATION: 96 %

## 2024-03-07 DIAGNOSIS — M05.10 RHEUMATOID LUNG (H): ICD-10-CM

## 2024-03-07 DIAGNOSIS — J45.40 REACTIVE AIRWAY DISEASE WITH WHEEZING, MODERATE PERSISTENT, UNCOMPLICATED: Primary | ICD-10-CM

## 2024-03-07 DIAGNOSIS — R06.09 DOE (DYSPNEA ON EXERTION): ICD-10-CM

## 2024-03-07 DIAGNOSIS — J84.9 ILD (INTERSTITIAL LUNG DISEASE) (H): ICD-10-CM

## 2024-03-07 DIAGNOSIS — J20.9 ACUTE BRONCHITIS, UNSPECIFIED ORGANISM: ICD-10-CM

## 2024-03-07 PROCEDURE — 99215 OFFICE O/P EST HI 40 MIN: CPT | Performed by: INTERNAL MEDICINE

## 2024-03-07 PROCEDURE — G2211 COMPLEX E/M VISIT ADD ON: HCPCS | Performed by: INTERNAL MEDICINE

## 2024-03-07 PROCEDURE — G0463 HOSPITAL OUTPT CLINIC VISIT: HCPCS | Performed by: INTERNAL MEDICINE

## 2024-03-07 RX ORDER — ARFORMOTEROL TARTRATE 15 UG/2ML
15 SOLUTION RESPIRATORY (INHALATION) 2 TIMES DAILY
Qty: 120 ML | Refills: 4 | Status: SHIPPED | OUTPATIENT
Start: 2024-03-07 | End: 2024-03-08

## 2024-03-07 RX ORDER — DOXYCYCLINE HYCLATE 100 MG
100 TABLET ORAL 2 TIMES DAILY
Qty: 14 TABLET | Refills: 0 | Status: SHIPPED | OUTPATIENT
Start: 2024-03-07 | End: 2024-03-14

## 2024-03-07 NOTE — LETTER
3/7/2024         RE: Wilbert Hutchins  [address]  [facility] [address]        Dear Colleague,    Thank you for referring your patient, Wilbert Hutchins, to the [facility] [place]. Please see a copy of my visit note below.    [facility]  ILD Clinic - Return Visit -  3/7/2024         Assessment and Plan:   Wilbert Hutchins is a 63 year old male with a history of RA interstitial lung disease who presents for a return visit.       1) Rheumatoid lung, slowly progressive   - Indeterminate for UIP in the setting of rheumatoid, DDx include DIP and RA lung (favored diagnosis)  - He continues to smoke cigarettes but down to 3-4 cig/day, he has tried wellbutrin in the past but suffered with side effects. Declined chantix previously, he is reducing smoking by himself and thinks he will quit in the near future  - Was referred to lung transplant and he declined it in December 2023 given concerns about risks and recovery process, his significant helped him with the decision   - Started RTX infusions with rheumatology Dr. Silva 3/2023, hasn't noticed symptomatic improvement, just [place] Salvador [place] and considering tocilizumab, but ofev was suggested first   - We discussed ofev side effects, the need for LFTs monitoring and concern for GI side effects/diarrhea troubling him as he is already very limited to get to the bathroom, he is still willing to try it. Also emphasized that ofev does not have a survival benefit and won't make him feel better, it would only slow down the progress of fibrosis  - Ofev 100 mg po BID x30 days then 150 mg po BID if tolerating  - LFTs q1 month x3 months then q3 months afterwards   - 2D Echo was negative for PH recently  - Will reach out to rheumatology to discuss tocilizumab       A) Supplemental oxygen  - Using portable oxygen  with compliance, needs 4 LPM at rest and 6 LPM with activity. Educated to increase O2 to 6 LPM prior to activity, he can walk only 30 feet with O2 then very exhausted. Also educated to check his O2 during these episodes.   - Ok to drive to [place] with oxygen, recommended to make frequent stops with walks every 2-3 hours to reduce risk of DVT     B) Nocturnal oximetry  He is using oxygen at nighttime at 4 LPM, declined sleep referral previoulsy, had sleep study 2018, AHI was 4.     2) Continued Tobacco Use  Trying to quit gradually, declined pharmacologic options per above.     3) GOLD D COPD   Overlapping with ILD, but symptoms are consistent with mucopurulent chronic bronchitis. He brings up almost one cup of thick sputum daily with worsening over the last few weeks. I suspect he is in a mild exacerbation. Also unable to inhale Anoro as he can't take a deep breath anymore. Not using duoneb because it doesn't help him (only once a week), however he gets some relief from albuterol inhaler.   - Doxycycline 100 mg po BID x7 days for exacerbation/acute bronchitis   - Discontinue Anoro ellipta  - Discontinue duoneb   - No need for ICS given current systemic prednisone  - Rx Brovana 15 mcg via nebulizer BID  - Rx atrovent BID-QID scheduled x2 weeks, if helpful in reducing cough and spuutm production, then continue scheduled atrovent   - Continue Daliresp 500 mcg po daily, experiencing symptomatic response with it     4) Immunocompromised (rituximab and prednisone)  With recent CD19 of 2   Up to date on pneumonia and flu vaccines    RTC: 4 months with spirometry and DLCO       I spent 50 minutes  total today, reviewing medical records including progress notes, multiple imaging studies from his previous available records, and documentation.      Matt Suárez MD  Pulmonary and Critical Care Medicine          Problem List:     Interstitial lung disease  HRCT (May 31, 2019) Indeterminate for UIP;   Peripheral reticulation,  mild paraseptal emphysema, GGO and peripheral reticulation and fibrosis basilar predominant. Mediastinal lymphadenopathy.  No air trapping on expiratory views. Repeat CT in 2023 did not show progress compared to 2022   Pulm: Dr. Machado previously ([facility])  Rheum: Dr. Salvador ([facility])  Treatment   On Prednisone since March 2018, current dose 10 mg daily   CellCept initiated in 2019 but could not tolerate due to exacerbation of arthralgias  On Plaquinil   Started RTX 3/2023, failed in late 2023     Tobacco and Marijuana use     Migrating arthralgias  Positive RF (52) and Anti-CCP (76)  No erosive arthritis on right hand film (April 6, 2017)     Obesity (Body mass index is 34 kg/m ).        Interval History:     Wilbert Hutchins is a 63 year old male with interstitial lung disease who presents for a return visit.  He was last seen in by me in 12/1/23    He continues to need oxygen at 6 LPM and very short of breath with minimal activity. Needs albuterol inhaler on daily basis. Started on Anoro in late 2022 with partial relief in dyspnea. He is also toelrating daliresp 500 which was ramped up late 2022 and thinks it helps his dyspnea and cough. Cigarettes are down to an average of 3-4 per day. No recent hospitalizations. He recent increase in cough and HALL, albuterol relieves his dyspnea partially. He is no longer able to inhaler Anoro as he cannot take a deep breath. He did have subjective fever recently, and denies sick contacts. Volume of sputum has also increased over the last few weeks. Using duoneb only once a week and unsure if it helps him. He declined lung transplant 12/2023 given concerns about risks and recovery process.            Physical Exam:   /83   Pulse 62   SpO2 96%     GENERAL: alert, NAD, in scooter   HEENT: NCAT, EOMI, masked  Lungs: decreased air movement, pan-inspiratory crackles more prominent at bases  CV: RRR, S1S2, no murmurs noted  Neuro: AAO X  3  Psychiatric: normal affect, good eye contact  Skin: no rash, jaundice or lesions on limited exam  Extremities: No cyanosis, clubbing, trace edema.         Imaging:     HRCT chest images from 11/1/2023 reviewed, agreed with radiology:   Findings:      Lungs: Upper lobe predominant paraseptal emphysema. Unchanged  bibasilar subpleural predominant reticular opacities and traction  bronchiectasis. No honeycombing. No air trapping or mosaic  attenuation. Stable pulmonary nodule including 6 mm right upper lobe  nodule on the right minor fissure series 3 image 135. Scattered  calcified granulomas.  Airways: Central tracheobronchial tree is clear.   Vessels: Main pulmonary artery and aorta are normal in caliber. Normal  three-vessel arch  Heart: Heart size is normal without pericardial effusion. Mild  coronary calcium.  Lymph nodes: No suspicious mediastinal or hilar lymphadenopathy.  Unchanged multiple subcentimeter mediastinal lymph nodes.  Thyroid: Imaged thyroid within normal limits.  Esophagus: Within normal limits     Upper abdomen: Evaluation of the upper abdomen is limited and without  contrast.     Bones and soft tissues: No suspicious axillary lymphadenopathy or soft  tissue mass. No suspicious osseous lesion.                                                                      Impression: Unchanged fibrotic interstitial lung disease since 2022.  Probable usual interstitial pneumonia              Pulmonary Function Tests:   12/1/23    My interpretation: Mild restriction, severe reduction in diffusion capacity that is not significantly different compared to PFTs in 2022 and 2023.                  Walked patient in clinic. First tested patient at rest, on room air, oxygen saturation was 82%. Patient was then placed on 4 LPM via nasal canula, walked patient, oxygen saturation increased to 87% while walking. Patient was then placed on 6 LPM via nasal canula, walked patient, oxygen saturation increased to 91% while  walking.     Lyudmila Heath, RN, BSN  ILD Nurse   [phone]            Again, thank you for allowing me to participate in the care of your patient.        Sincerely,        Matt Suárez MD

## 2024-03-07 NOTE — NURSING NOTE
Chief Complaint   Patient presents with    Interstitial Lung Disease (ILD)     F/u     Vitals were taken and medications were reconciled.     KATE Inman

## 2024-03-07 NOTE — NURSING NOTE
Planned to educate pt on OFEV but pt declined, said it [place] be [place] [facility]     Spoke to pt re: oxygen needs 4L at rest and 6L with activity.   Current DME: [employer]  Current home equipment:  Home concentrator up to 10L  D tanks for portability with home refill    Spoke to [employer], they are unable to provide pre-filled E tanks for pt. Pt started new billing cycle January 2024. Spoke to [employer], they can provide E tanks for pt. Spoke to pt, he is willing to change to [employer]. O2 order placed for 4L at night, 6L with activity, 4L at noc.  Staff message sent to Luis to fax facesheet, O2 order, MD notes, walk test.     Lyudmila Heath, RN, BSN  ILD Nurse   [phone]

## 2024-03-07 NOTE — PROGRESS NOTES
[facility]  ILD Clinic - Return Visit -  3/7/2024         Assessment and Plan:   Wilbert Hutchins is a 63 year old male with a history of RA interstitial lung disease who presents for a return visit.       1) Rheumatoid lung, slowly progressive   - Indeterminate for UIP in the setting of rheumatoid, DDx include DIP and RA lung (favored diagnosis)  - He continues to smoke cigarettes but down to 3-4 cig/day, he has tried wellbutrin in the past but suffered with side effects. Declined chantix previously, he is reducing smoking by himself and thinks he will quit in the near future  - Was referred to lung transplant and he declined it in December 2023 given concerns about risks and recovery process, his significant helped him with the decision   - Started RTX infusions with rheumatology Dr. Silva 3/2023, hasn't noticed symptomatic improvement, just saw his new rheumatologist Dr Salvador ([facility]) and considering tocilizumab, but ofev was suggested first   - We discussed ofev side effects, the need for LFTs monitoring and concern for GI side effects/diarrhea troubling him as he is already very limited to get to the bathroom, he is still willing to try it. Also emphasized that ofev does not have a survival benefit and won't make him feel better, it would only slow down the progress of fibrosis  - Ofev 100 mg po BID x30 days then 150 mg po BID if tolerating  - LFTs q1 month x3 months then q3 months afterwards   - 2D Echo was negative for PH recently  - Will reach out to rheumatology to discuss tocilizumab       A) Supplemental oxygen  - Using portable oxygen with compliance, needs 4 LPM at rest and 6 LPM with activity. Educated to increase O2 to 6 LPM prior to activity, he can walk only 30 feet with O2 then very exhausted. Also educated to check his O2 during these episodes.   - Ok to drive to [place] with oxygen, recommended to make frequent stops with walks every  2-3 hours to reduce risk of DVT     B) Nocturnal oximetry  He is using oxygen at nighttime at 4 LPM, declined sleep referral previoulsy, had sleep study 2018, AHI was 4.     2) Continued Tobacco Use  Trying to quit gradually, declined pharmacologic options per above.     3) GOLD D COPD   Overlapping with ILD, but symptoms are consistent with mucopurulent chronic bronchitis. He brings up almost one cup of thick sputum daily with worsening over the last few weeks. I suspect he is in a mild exacerbation. Also unable to inhale Anoro as he can't take a deep breath anymore. Not using duoneb because it doesn't help him (only once a week), however he gets some relief from albuterol inhaler.   - Doxycycline 100 mg po BID x7 days for exacerbation/acute bronchitis   - Discontinue Anoro ellipta  - Discontinue duoneb   - No need for ICS given current systemic prednisone  - Rx Brovana 15 mcg via nebulizer BID  - Rx atrovent BID-QID scheduled x2 weeks, if helpful in reducing cough and spuutm production, then continue scheduled atrovent   - Continue Daliresp 500 mcg po daily, experiencing symptomatic response with it     4) Immunocompromised (rituximab and prednisone)  With recent CD19 of 2   Up to date on pneumonia and flu vaccines    RTC: 4 months with spirometry and DLCO       I spent 50 minutes  total today, reviewing medical records including progress notes, multiple imaging studies from his previous available records, and documentation.      Matt Suárez MD  Pulmonary and Critical Care Medicine          Problem List:     Interstitial lung disease  HRCT (May 31, 2019) Indeterminate for UIP;   Peripheral reticulation, mild paraseptal emphysema, GGO and peripheral reticulation and fibrosis basilar predominant. Mediastinal lymphadenopathy.  No air trapping on expiratory views. Repeat CT in 2023 did not show progress compared to 2022   Pulm: Dr. Machado previously ([facility])  Rheum: Dr. Salvador ([facility])  Treatment   On Prednisone since March 2018, current dose 10 mg daily   CellCept initiated in 2019 but could not tolerate due to exacerbation of arthralgias  On Plaquinil   Started RTX 3/2023, failed in late 2023     Tobacco and Marijuana use     Migrating arthralgias  Positive RF (52) and Anti-CCP (76)  No erosive arthritis on right hand film (April 6, 2017)     Obesity (Body mass index is 34 kg/m ).        Interval History:     Wilbert Hutchins is a 63 year old male with interstitial lung disease who presents for a return visit.  He was last seen in by me in 12/1/23    He continues to need oxygen at 6 LPM and very short of breath with minimal activity. Needs albuterol inhaler on daily basis. Started on Anoro in late 2022 with partial relief in dyspnea. He is also toelrating daliresp 500 which was ramped up late 2022 and thinks it helps his dyspnea and cough. Cigarettes are down to an average of 3-4 per day. No recent hospitalizations. He recent increase in cough and HALL, albuterol relieves his dyspnea partially. He is no longer able to inhaler Anoro as he cannot take a deep breath. He did have subjective fever recently, and denies sick contacts. Volume of sputum has also increased over the last few weeks. Using duoneb only once a week and unsure if it helps him. He declined lung transplant 12/2023 given concerns about risks and recovery process.            Physical Exam:   /83   Pulse 62   SpO2 96%     GENERAL: alert, NAD, in scooter   HEENT: NCAT, EOMI, masked  Lungs: decreased air movement, pan-inspiratory crackles more prominent at bases  CV: RRR, S1S2, no murmurs noted  Neuro: AAO X 3  Psychiatric: normal affect, good eye contact  Skin: no rash, jaundice or lesions on limited exam  Extremities: No cyanosis, clubbing, trace edema.         Imaging:     HRCT chest images from 11/1/2023 reviewed, agreed with radiology:   Findings:      Lungs: Upper lobe predominant paraseptal emphysema.  Unchanged  bibasilar subpleural predominant reticular opacities and traction  bronchiectasis. No honeycombing. No air trapping or mosaic  attenuation. Stable pulmonary nodule including 6 mm right upper lobe  nodule on the right minor fissure series 3 image 135. Scattered  calcified granulomas.  Airways: Central tracheobronchial tree is clear.   Vessels: Main pulmonary artery and aorta are normal in caliber. Normal  three-vessel arch  Heart: Heart size is normal without pericardial effusion. Mild  coronary calcium.  Lymph nodes: No suspicious mediastinal or hilar lymphadenopathy.  Unchanged multiple subcentimeter mediastinal lymph nodes.  Thyroid: Imaged thyroid within normal limits.  Esophagus: Within normal limits     Upper abdomen: Evaluation of the upper abdomen is limited and without  contrast.     Bones and soft tissues: No suspicious axillary lymphadenopathy or soft  tissue mass. No suspicious osseous lesion.                                                                      Impression: Unchanged fibrotic interstitial lung disease since 2022.  Probable usual interstitial pneumonia              Pulmonary Function Tests:   12/1/23    My interpretation: Mild restriction, severe reduction in diffusion capacity that is not significantly different compared to PFTs in 2022 and 2023.

## 2024-03-07 NOTE — PATIENT INSTRUCTIONS
Stop Anoro ellipta  Start Brovana nebulized twice a day  Use atrovent twice a day via nebulizer, any order, if this combination works for you, continue this regimen. If you don't feel any relief after two weeks let us know.     Increase oxygen as needed 4-6 Liters, increase to 6 liters before activity.   Continue daliresp and rheumatology medications    Please call our direct ILD nurses line for questions and concerns: [phone]    For scheduling, please call: [phone]

## 2024-03-07 NOTE — PROGRESS NOTES
Walked patient in clinic. First tested patient at rest, on room air, oxygen saturation was 82%. Patient was then placed on 4 LPM via nasal canula, walked patient, oxygen saturation increased to 87% while walking. Patient was then placed on 6 LPM via nasal canula, walked patient, oxygen saturation increased to 91% while walking.     Lyudmila Heath, RN, BSN  ILD Nurse   [phone]

## 2024-03-08 DIAGNOSIS — J45.40 REACTIVE AIRWAY DISEASE WITH WHEEZING, MODERATE PERSISTENT, UNCOMPLICATED: ICD-10-CM

## 2024-03-08 RX ORDER — ARFORMOTEROL TARTRATE 15 UG/2ML
15 SOLUTION RESPIRATORY (INHALATION) 2 TIMES DAILY
Qty: 120 ML | Refills: 4 | Status: SHIPPED | OUTPATIENT
Start: 2024-03-08 | End: 2024-03-11

## 2024-03-11 ENCOUNTER — MYC MEDICAL ADVICE (OUTPATIENT)
Dept: PULMONOLOGY | Facility: CLINIC | Age: 64
End: 2024-03-11
Payer: COMMERCIAL

## 2024-03-11 DIAGNOSIS — J45.40 REACTIVE AIRWAY DISEASE WITH WHEEZING, MODERATE PERSISTENT, UNCOMPLICATED: ICD-10-CM

## 2024-03-11 DIAGNOSIS — J44.9 COPD, GROUP D, BY GOLD 2017 CLASSIFICATION (H): ICD-10-CM

## 2024-03-11 DIAGNOSIS — J41.1 MUCOPURULENT CHRONIC BRONCHITIS (H): ICD-10-CM

## 2024-03-11 RX ORDER — ARFORMOTEROL TARTRATE 15 UG/2ML
15 SOLUTION RESPIRATORY (INHALATION) 2 TIMES DAILY
Qty: 120 ML | Refills: 4 | Status: SHIPPED | OUTPATIENT
Start: 2024-03-11 | End: 2024-08-02

## 2024-03-11 RX ORDER — ROFLUMILAST 500 UG/1
TABLET ORAL
Qty: 90 TABLET | Refills: 3 | Status: SHIPPED | OUTPATIENT
Start: 2024-03-11

## 2024-03-11 RX ORDER — ROFLUMILAST 250 UG/1
TABLET ORAL
Qty: 90 TABLET | Refills: 0 | OUTPATIENT
Start: 2024-03-11 | End: 2024-05-10

## 2024-03-12 ENCOUNTER — TELEPHONE (OUTPATIENT)
Dept: PULMONOLOGY | Facility: CLINIC | Age: 64
End: 2024-03-12
Payer: COMMERCIAL

## 2024-03-12 DIAGNOSIS — J45.40 REACTIVE AIRWAY DISEASE WITH WHEEZING, MODERATE PERSISTENT, UNCOMPLICATED: Primary | ICD-10-CM

## 2024-03-12 NOTE — TELEPHONE ENCOUNTER
Prior Authorization Specialty Medication Request    Medication/Dose: arformoterol (BROVANA) 15 MCG/2ML NEBU neb solution  Diagnosis and ICD code (if different than what is on RX):    New/renewal/insurance change PA/secondary ins. PA:  Previously Tried and Failed:      Important Lab Values:   Rationale:     Insurance   Primary:   Insurance ID:      Secondary (if applicable):  Insurance ID:      Pharmacy Information (if different than what is on RX)  Name:  [facility]  Phone:  [phone]

## 2024-03-16 ENCOUNTER — HEALTH MAINTENANCE LETTER (OUTPATIENT)
Age: 64
End: 2024-03-16

## 2024-03-17 NOTE — TELEPHONE ENCOUNTER
PA Initiation    Medication: ARFORMOTEROL TARTRATE 15 MCG/2ML IN [facility]  Insurance Company: MEDICA - Phone [phone] Fax [phone]  Pharmacy Filling the Rx: [facility] - [place], [place] - [facility] [address]  Filling Pharmacy Phone: [phone]  Filling Pharmacy Fax:    Start Date: 3/17/2024    Key: [id number]

## 2024-03-22 ENCOUNTER — ONCOLOGY VISIT (OUTPATIENT)
Dept: PALLIATIVE CARE | Facility: CLINIC | Age: 64
End: 2024-03-22
Attending: INTERNAL MEDICINE
Payer: COMMERCIAL

## 2024-03-22 VITALS
SYSTOLIC BLOOD PRESSURE: 129 MMHG | HEART RATE: 67 BPM | RESPIRATION RATE: 18 BRPM | DIASTOLIC BLOOD PRESSURE: 85 MMHG | TEMPERATURE: 98.1 F | OXYGEN SATURATION: 97 %

## 2024-03-22 DIAGNOSIS — R06.02 SHORTNESS OF BREATH: ICD-10-CM

## 2024-03-22 DIAGNOSIS — J84.9 ILD (INTERSTITIAL LUNG DISEASE) (H): Primary | ICD-10-CM

## 2024-03-22 DIAGNOSIS — R45.86 MOOD AND AFFECT DISTURBANCE: ICD-10-CM

## 2024-03-22 PROCEDURE — 99215 OFFICE O/P EST HI 40 MIN: CPT | Performed by: INTERNAL MEDICINE

## 2024-03-22 PROCEDURE — G0463 HOSPITAL OUTPT CLINIC VISIT: HCPCS | Performed by: INTERNAL MEDICINE

## 2024-03-22 RX ORDER — ALBUTEROL SULFATE 0.83 MG/ML
2.5 SOLUTION RESPIRATORY (INHALATION) EVERY 4 HOURS PRN
Qty: 90 ML | Refills: 0 | Status: SHIPPED | OUTPATIENT
Start: 2024-03-22 | End: 2024-08-02

## 2024-03-22 RX ORDER — MORPHINE SULFATE 15 MG/1
15 TABLET, FILM COATED, EXTENDED RELEASE ORAL EVERY 12 HOURS
Qty: 60 TABLET | Refills: 0 | Status: SHIPPED | OUTPATIENT
Start: 2024-03-26 | End: 2024-04-23

## 2024-03-22 ASSESSMENT — PAIN SCALES - GENERAL: PAINLEVEL: NO PAIN (0)
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
## 2024-03-22 NOTE — TELEPHONE ENCOUNTER
PRIOR AUTHORIZATION DENIED    Medication: ARFORMOTEROL TARTRATE 15 MCG/2ML IN [facility]  Insurance Company: MEDICA - Phone [phone] Fax [phone]  Denial Date: 3/18/2024  Denial Reason(s): Has not try/failed enough formulary alternatives.  Appeal Information: Fax supporting information to [phone]  Patient Notified: Not yet, care team first
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
## 2024-03-22 NOTE — NURSING NOTE
"Oncology Rooming Note    March 22, 2024 11:28 AM   Wilbert Hucthins is a 64 year old male who presents for:    Chief Complaint   Patient presents with    Oncology Clinic Visit     Palliative Care     Initial Vitals: /85 (BP Location: Right arm, Patient Position: Sitting, Cuff Size: Adult Large)   Pulse 67   Temp 98.1  F (36.7  C) (Tympanic)   Resp 18   SpO2 97%  Estimated body mass index is 33 kg/m  as calculated from the following:    Height as of 12/22/23: 1.778 m (5' 10\").    Weight as of 12/22/23: 104.3 kg (230 lb). There is no height or weight on file to calculate BSA.  No Pain (0) Comment: Data Unavailable   No LMP for male patient.  Allergies reviewed: Yes  Medications reviewed: Yes    Medications: MEDICATION REFILLS NEEDED TODAY. Provider was notified.  Pharmacy name entered into [facility]:    [id number] - [place] [id number] [address] [id number] - [facility] [address] [facility] [address] & [facility] - [place], [place] - [address]    Frailty Screening:   Is the patient here for a new oncology consult visit in cancer care? 2. No      Clinical concerns: None       Mary Mckeon LPN  3/22/2024              "

## 2024-04-23 DIAGNOSIS — J84.9 ILD (INTERSTITIAL LUNG DISEASE) (H): ICD-10-CM

## 2024-04-23 DIAGNOSIS — R06.02 SHORTNESS OF BREATH: ICD-10-CM

## 2024-04-23 NOTE — TELEPHONE ENCOUNTER
Received telephone call from patient requesting refill of MS contin.     Last refill: 3/26/24  Last office visit: 3/22/24  Scheduled for follow up 7/12/24     Will route request to MD for review.     Reviewed [place]  Report.

## 2024-04-24 RX ORDER — MORPHINE SULFATE 15 MG/1
15 TABLET, FILM COATED, EXTENDED RELEASE ORAL EVERY 12 HOURS
Qty: 60 TABLET | Refills: 0 | Status: SHIPPED | OUTPATIENT
Start: 2024-04-24 | End: 2024-05-23

## 2024-04-25 ENCOUNTER — OFFICE VISIT (OUTPATIENT)
Dept: ONCOLOGY | Facility: CLINIC | Age: 64
End: 2024-04-25
Attending: INTERNAL MEDICINE
Payer: COMMERCIAL

## 2024-04-25 DIAGNOSIS — Z51.5 ENCOUNTER FOR PALLIATIVE CARE: ICD-10-CM

## 2024-04-25 DIAGNOSIS — F43.25 ADJUSTMENT DISORDER WITH MIXED DISTURBANCE OF EMOTIONS AND CONDUCT: Primary | ICD-10-CM

## 2024-04-25 PROCEDURE — 90834 PSYTX W PT 45 MINUTES: CPT | Performed by: SOCIAL WORKER

## 2024-04-25 NOTE — PROGRESS NOTES
Palliative Care Clinical Social Work Return Appointment    PLEASE NOTE:  THIS IS A MENTAL HEALTH NOTE.  OTHER PROVIDERS VIEWING THIS NOTE SHOULD USE THIS ONLY FOR UNDERSTANDING THE CONTEXT OF THE PATIENT'S EXPERIENCE.  TOPICS DESCRIBED IN THIS NOTE SHOULD NOT BE REFERENCED TO THE PATIENT BY MEDICAL PROVIDERS.    Garry Hutchins is a 63 year old man with medical history that includes ILD, current tobacco and marijuana use, history of Alcohol use disorder, self reports of bipolar disorder, PTSD, and antisocial personality disorder, as well as adjustment disorder with mixed disturbance of emotions and conduct seen today at [facility] palliative care [facility]  for a return psychotherapy appointment to address adjustment related difficulties as it relates to coping with illness and treatment.    Mental Status Exam:(List all that apply)      Appearance: Appropriate      Eye Contact: Good       Orientation: Yes, x4      Mood: Normal      Affect: Appropriate      Thought Content: Clear         Thought Form: Logical      Psychomotor Behavior: Normal    Visit themes:     Adjustment to Illness: Feels his ILD is progressing though today he reports feeling fairly well.      Mental Health (thoughts, feelings, actions, coping, and symptoms):     Reports relationship with partner Eve is stable.  No new developments.     Continues to report that it is difficult to leave the house, spends much of his time smoking and continues to watch the news -- no desire to alter any of these habits at this time.       Did get a journal, has not started journaling. THought he would start on his birthday, but so far has not.     Helpful activities: endurance for activities outside of the home is low. COntinues to smoke nicotine and marijuana. Was part of an online poker group, but it sounds like the group is smaller than it used to be and Garry does not find it to be as satisfying.   Helpful cognitions:     Unhelpful and/or triggering or  exacerbating factors: complex trauma history (please see my note from March 2021), limited social support    Body-Mind Skills Education:     Relationships: Complex.  Lives with long time significant other, Myra     End of Life and Advance Care Planning: Has a HCD and POLST on file     Main therapeutic interventions provided this session include:   psychotherapy, psychoeducation, limit setting    Plan: Will return for counseling in a month. Have asked schedulers to reach out.     Time spent with patient/family:  50 minutes  (Start 12:30, end 1:20)    BILL Gardner, [facility]   Palliative Care Clinical   Ph: [phone]      DO NOT SEND ANY LETTERS

## 2024-05-14 DIAGNOSIS — J44.9 COPD, GROUP D, BY GOLD 2017 CLASSIFICATION (H): ICD-10-CM

## 2024-05-14 RX ORDER — UMECLIDINIUM BROMIDE AND VILANTEROL TRIFENATATE 62.5; 25 UG/1; UG/1
1 POWDER RESPIRATORY (INHALATION) DAILY
Qty: 60 EACH | Refills: 0 | OUTPATIENT
Start: 2024-05-14

## 2024-05-23 DIAGNOSIS — R06.02 SHORTNESS OF BREATH: ICD-10-CM

## 2024-05-23 DIAGNOSIS — J84.9 ILD (INTERSTITIAL LUNG DISEASE) (H): ICD-10-CM

## 2024-05-23 RX ORDER — MORPHINE SULFATE 15 MG/1
15 TABLET, FILM COATED, EXTENDED RELEASE ORAL EVERY 12 HOURS
Qty: 60 TABLET | Refills: 0 | Status: SHIPPED | OUTPATIENT
Start: 2024-05-23 | End: 2024-06-24

## 2024-05-23 NOTE — TELEPHONE ENCOUNTER
Received voicemail from patient requesting refill of morphine ER.     Last refill: 4/25/2024  Last office visit: 3/22/2024  Scheduled for follow up 7/12/2024    Will route request to MD for review.     Reviewed [place]  Report.

## 2024-06-17 ENCOUNTER — MYC MEDICAL ADVICE (OUTPATIENT)
Dept: PULMONOLOGY | Facility: CLINIC | Age: 64
End: 2024-06-17
Payer: COMMERCIAL

## 2024-06-17 DIAGNOSIS — J44.9 COPD, GROUP D, BY GOLD 2017 CLASSIFICATION (H): Primary | ICD-10-CM

## 2024-06-24 DIAGNOSIS — J84.9 ILD (INTERSTITIAL LUNG DISEASE) (H): ICD-10-CM

## 2024-06-24 DIAGNOSIS — R06.02 SHORTNESS OF BREATH: ICD-10-CM

## 2024-06-24 RX ORDER — MORPHINE SULFATE 15 MG/1
15 TABLET, FILM COATED, EXTENDED RELEASE ORAL EVERY 12 HOURS
Qty: 60 TABLET | Refills: 0 | Status: SHIPPED | OUTPATIENT
Start: 2024-06-24 | End: 2024-07-23

## 2024-06-24 NOTE — TELEPHONE ENCOUNTER
Received voicemail from patient requesting refill of MS Contin.     Last refill: 5/26/2024  Last office visit: 3/22/2024  Scheduled for follow up 7/12/2024    Will route request to NP for review.     Reviewed [place]  Report.

## 2024-06-27 ENCOUNTER — OFFICE VISIT (OUTPATIENT)
Dept: ONCOLOGY | Facility: CLINIC | Age: 64
End: 2024-06-27
Attending: SOCIAL WORKER
Payer: COMMERCIAL

## 2024-06-27 DIAGNOSIS — F43.25 ADJUSTMENT DISORDER WITH MIXED DISTURBANCE OF EMOTIONS AND CONDUCT: Primary | ICD-10-CM

## 2024-06-27 DIAGNOSIS — Z51.5 ENCOUNTER FOR PALLIATIVE CARE: ICD-10-CM

## 2024-06-27 PROCEDURE — 90834 PSYTX W PT 45 MINUTES: CPT | Performed by: SOCIAL WORKER
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
## 2024-07-12 ENCOUNTER — ONCOLOGY VISIT (OUTPATIENT)
Dept: PALLIATIVE CARE | Facility: CLINIC | Age: 64
End: 2024-07-12
Attending: INTERNAL MEDICINE
Payer: COMMERCIAL

## 2024-07-12 ENCOUNTER — OFFICE VISIT (OUTPATIENT)
Dept: PULMONOLOGY | Facility: CLINIC | Age: 64
End: 2024-07-12
Payer: COMMERCIAL

## 2024-07-12 VITALS
OXYGEN SATURATION: 95 % | RESPIRATION RATE: 20 BRPM | SYSTOLIC BLOOD PRESSURE: 112 MMHG | TEMPERATURE: 98 F | HEART RATE: 51 BPM | DIASTOLIC BLOOD PRESSURE: 73 MMHG

## 2024-07-12 DIAGNOSIS — J84.9 ILD (INTERSTITIAL LUNG DISEASE) (H): Primary | ICD-10-CM

## 2024-07-12 DIAGNOSIS — R06.02 SHORTNESS OF BREATH: ICD-10-CM

## 2024-07-12 DIAGNOSIS — M05.10 RHEUMATOID LUNG (H): ICD-10-CM

## 2024-07-12 DIAGNOSIS — Z79.891 ENCOUNTER FOR LONG-TERM OPIATE ANALGESIC USE: ICD-10-CM

## 2024-07-12 PROCEDURE — 99214 OFFICE O/P EST MOD 30 MIN: CPT | Performed by: INTERNAL MEDICINE

## 2024-07-12 PROCEDURE — 94729 DIFFUSING CAPACITY: CPT | Performed by: INTERNAL MEDICINE

## 2024-07-12 PROCEDURE — G0463 HOSPITAL OUTPT CLINIC VISIT: HCPCS | Performed by: INTERNAL MEDICINE

## 2024-07-12 PROCEDURE — 94375 RESPIRATORY FLOW VOLUME LOOP: CPT | Performed by: INTERNAL MEDICINE

## 2024-07-12 RX ORDER — DEXTROMETHORPHAN HYDROBROMIDE, GUAIFENESIN 10; 100 MG/5ML; MG/5ML
LIQUID ORAL
COMMUNITY
Start: 2024-03-28

## 2024-07-12 RX ORDER — CARBOXYMETHYLCELLULOSE SODIUM 5 MG/ML
SOLUTION/ DROPS OPHTHALMIC
COMMUNITY
Start: 2024-04-30

## 2024-07-12 ASSESSMENT — PAIN SCALES - GENERAL: PAINLEVEL: NO PAIN (0)

## 2024-07-12 NOTE — PROGRESS NOTES
"Palliative Care Outpatient Clinic      Patient ID:  Medical - He has ILD, chronic bronchitis, and undifferentiated connective tissue disease on 6 lpm (previously on 4L) O2. More recently described as RA. Not interested in lung transplant; smokes tobacco and cannabis daily. He has PTSD.  3/2024 offered Ofev; probably won't take it     We have been following for dyspnea & care planning; Dr Oliveira started morphine-->morphineER 15mg bid Summer 2021.     Folows with R Aracely LICSW     Social - Lives with wife Eve. 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    Cannabis and tobacco use disorders: continues to smoke despite health consequences & ineligiblity for lung transplantation; considers his smoking as integral to his qol and is not interested in stopping. He has turned down transplant eval at least twice.  Estranged relationships with children \"their choice, not mine.\"   Has homecare nursing and case management via [employer] program via [facility].      Care Planning - +[facility] on file, names Myra as his agent. DNR/DNI POLST completed 11/2021. Hospice education completed (he is not eligible currently). He has expressed interested in assisted suicide if ever legalized. Confirms he wants DNR/DNI order 9/2023. KNows he could die at anytime, may live for years.     Opioid Safety - +Naloxone  Last UDS 9/2023 as expected      History:  History gathered today from: patient, medical chart, outside records including Care Everywhere    No major events.  Tried Ofev--diarrhea--stopped  Apparently he's getting an injectable DMARD now at [facility], I can't quite tell what it is    Overall he feels a little worse, nothing dramatic. Much coughing and mucous in the morning  Down on the ground and really had a hard time gtting up which was scary    Takes morphine bid; no side effects; bowels ok; feels helps modestly for dyspnea    PE: /73 (BP Location: Right arm, Patient Position: " Sitting, Cuff Size: Adult Large)   Pulse 51   Temp 98  F (36.7  C) (Oral)   Resp 20   SpO2 95%    Wt Readings from Last 3 Encounters:   12/22/23 104.3 kg (230 lb)   12/01/23 106.6 kg (235 lb)   06/09/23 102.1 kg (225 lb)     Alert NAD      Data reviewed:  I reviewed recent labs and imaging, my comments:  Outside labs reviewed     database reviewed: y      Impression & Recommendations:  65 yo with ILD and chronic dyspnea on long term MSContin 15 mg bid    Dyspnea--largely stable; no changes rec'd    D/w him again about pulmonary rehab--it's a big time commitment but really helps most people who do it--better function, strength. He's interested again so I made the referral    Follow-up 3 mo        Over 30 minutes spent on the date of the encounter doing chart review, history and exam, patient education & counseling, documentation and other activities as noted above.    Thank you for involving us in the patient's care.   Jostin Peralta MD / Palliative Medicine / Text me via [employer]  This note may have been composed with voice recognition software and there may be mistranscriptions.

## 2024-07-12 NOTE — LETTER
"7/12/2024       RE: Wilbert Hutchins  [address]  [place] [address]     Dear Colleague,    Thank you for referring your patient, Wilbert Hutchins, to the [facility] at [facility]. Please see a copy of my visit note below.    Palliative Care Outpatient Clinic      Patient ID:  Medical - He has ILD, chronic bronchitis, and undifferentiated connective tissue disease on 6 lpm (previously on 4L) O2. More recently described as RA. Not interested in lung transplant; smokes tobacco and cannabis daily. He has PTSD.  3/2024 offered Ofev; probably won't take it     We have been following for dyspnea & care planning; Dr Oliveira started morphine-->morphineER 15mg bid Summer 2021.     Folows with SAWYER Houseky DANTE     Social - Lives with wife Eve. 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    Cannabis and tobacco use disorders: continues to smoke despite health consequences & ineligiblity for lung transplantation; considers his smoking as integral to his qol and is not interested in stopping. He has turned down transplant eval at least twice.  Estranged relationships with children \"their choice, not mine.\"   Has homecare nursing and case management via [employer] program via [facility].      Care Planning - +[facility] on file, names Myra as his agent. DNR/DNI POLST completed 11/2021. Hospice education completed (he is not eligible currently). He has expressed interested in assisted suicide if ever legalized. Confirms he wants DNR/DNI order 9/2023. KNows he could die at anytime, may live for years.     Opioid Safety - +Naloxone  Last UDS 9/2023 as expected      History:  History gathered today from: patient, medical chart, outside records including Care Everywhere    No major events.  Tried Ofev--diarrhea--stopped  Apparently [place] [facility] I can't quite " tell what it is    Overall he feels a little worse, nothing dramatic. Much coughing and mucous in the morning  Down on the ground and really had a hard time gtting up which was scary    Takes morphine bid; no side effects; bowels ok; feels helps modestly for dyspnea    PE: /73 (BP Location: Right arm, Patient Position: Sitting, Cuff Size: Adult Large)   Pulse 51   Temp 98  F (36.7  C) (Oral)   Resp 20   SpO2 95%    Wt Readings from Last 3 Encounters:   12/22/23 104.3 kg (230 lb)   12/01/23 106.6 kg (235 lb)   06/09/23 102.1 kg (225 lb)     Alert NAD      Data reviewed:  I reviewed recent labs and imaging, my comments:  Outside labs reviewed     database reviewed: y      Impression & Recommendations:  65 yo with ILD and chronic dyspnea on long term MSContin 15 mg bid    Dyspnea--largely stable; no changes rec'd    D/w him again about pulmonary rehab--it's a big time commitment but really helps most people who do it--better function, strength. He's interested again so I made the referral    Follow-up 3 mo        Over 30 minutes spent on the date of the encounter doing chart review, history and exam, patient education & counseling, documentation and other activities as noted above.    Thank you for involving us in the patient's care.   Jostin Peralta MD / Palliative Medicine / Text me via [employer]  This note may have been composed with voice recognition software and there may be mistranscriptions.

## 2024-07-12 NOTE — PROGRESS NOTES
Wilbert Hutchins comes into clinic today at the request of binu Suárez Ordering Provider for PFT      Ry Cunha, RRT

## 2024-07-12 NOTE — NURSING NOTE
"Oncology Rooming Note    July 12, 2024 11:09 AM   Wilbert Hutchins is a 64 year old male who presents for:    Chief Complaint   Patient presents with    Oncology Clinic Visit     Palliative care      Initial Vitals: /73 (BP Location: Right arm, Patient Position: Sitting, Cuff Size: Adult Large)   Pulse 51   Temp 98  F (36.7  C) (Oral)   Resp 20   SpO2 95%  Estimated body mass index is 33 kg/m  as calculated from the following:    Height as of 12/22/23: 1.778 m (5' 10\").    Weight as of 12/22/23: 104.3 kg (230 lb). There is no height or weight on file to calculate BSA.  No Pain (0) Comment: Data Unavailable   No LMP for male patient.  Allergies reviewed: Yes  Medications reviewed: Yes    Medications: Medication refills not needed today.  Pharmacy name entered into [employer]    [id number] - [address] [facility] - [place], [place] - [address]    Frailty Screening:   Is the patient here for a new oncology consult visit in cancer care? 2. No      Clinical concerns: none    Elizabeth Bertrand              "

## 2024-07-13 LAB
DLCOUNC-%PRED-PRE: 37 %
DLCOUNC-PRE: 10.23 ML/MIN/MMHG
DLCOUNC-PRED: 27.19 ML/MIN/MMHG
ERV-%PRED-PRE: 66 %
ERV-PRE: 1.04 L
ERV-PRED: 1.55 L
EXPTIME-PRE: 6.3 SEC
FEF2575-%PRED-PRE: 73 %
FEF2575-PRE: 2 L/SEC
FEF2575-PRED: 2.71 L/SEC
FEFMAX-%PRED-PRE: 87 %
FEFMAX-PRE: 7.88 L/SEC
FEFMAX-PRED: 9.05 L/SEC
FEV1-%PRED-PRE: 76 %
FEV1-PRE: 2.48 L
FEV1FEV6-PRE: 78 %
FEV1FEV6-PRED: 79 %
FEV1FVC-PRE: 79 %
FEV1FVC-PRED: 78 %
FEV1SVC-PRE: 82 %
FEV1SVC-PRED: 74 %
FIFMAX-PRE: 6.56 L/SEC
FVC-%PRED-PRE: 75 %
FVC-PRE: 3.16 L
FVC-PRED: 4.19 L
IC-%PRED-PRE: 64 %
IC-PRE: 2 L
IC-PRED: 3.1 L
VA-%PRED-PRE: 62 %
VA-PRE: 4.07 L
VC-%PRED-PRE: 68 %
VC-PRE: 3.04 L
VC-PRED: 4.42 L

## 2024-07-23 DIAGNOSIS — J84.9 ILD (INTERSTITIAL LUNG DISEASE) (H): ICD-10-CM

## 2024-07-23 DIAGNOSIS — R06.02 SHORTNESS OF BREATH: ICD-10-CM

## 2024-07-23 RX ORDER — MORPHINE SULFATE 15 MG/1
15 TABLET, FILM COATED, EXTENDED RELEASE ORAL EVERY 12 HOURS
Qty: 60 TABLET | Refills: 0 | Status: SHIPPED | OUTPATIENT
Start: 2024-07-23 | End: 2024-08-23

## 2024-07-23 NOTE — TELEPHONE ENCOUNTER
Received voicemail from patient requesting refill of MS contin.     Last refill: 6/24/24  Last office visit: 7/12/24  Scheduled for follow up 10/11/24     Will route request to MD for review.     Reviewed [place]  Report.

## 2024-08-02 ENCOUNTER — OFFICE VISIT (OUTPATIENT)
Dept: PULMONOLOGY | Facility: CLINIC | Age: 64
End: 2024-08-02
Attending: INTERNAL MEDICINE
Payer: COMMERCIAL

## 2024-08-02 ENCOUNTER — HOSPITAL ENCOUNTER (OUTPATIENT)
Dept: CARDIAC REHAB | Facility: CLINIC | Age: 64
Discharge: HOME | End: 2024-08-02
Attending: INTERNAL MEDICINE
Payer: COMMERCIAL

## 2024-08-02 VITALS
HEIGHT: 70 IN | BODY MASS INDEX: 32.93 KG/M2 | WEIGHT: 230 LBS | DIASTOLIC BLOOD PRESSURE: 79 MMHG | HEART RATE: 63 BPM | RESPIRATION RATE: 17 BRPM | SYSTOLIC BLOOD PRESSURE: 135 MMHG | OXYGEN SATURATION: 96 %

## 2024-08-02 DIAGNOSIS — J84.9 ILD (INTERSTITIAL LUNG DISEASE) (H): ICD-10-CM

## 2024-08-02 DIAGNOSIS — J41.1 MUCOPURULENT CHRONIC BRONCHITIS (H): Primary | ICD-10-CM

## 2024-08-02 PROCEDURE — G0238 OTH RESP PROC, INDIV: HCPCS

## 2024-08-02 PROCEDURE — 99214 OFFICE O/P EST MOD 30 MIN: CPT | Mod: GC | Performed by: INTERNAL MEDICINE

## 2024-08-02 PROCEDURE — G0463 HOSPITAL OUTPT CLINIC VISIT: HCPCS | Performed by: INTERNAL MEDICINE

## 2024-08-02 RX ORDER — IPRATROPIUM BROMIDE AND ALBUTEROL 20; 100 UG/1; UG/1
1 SPRAY, METERED RESPIRATORY (INHALATION) 4 TIMES DAILY PRN
Qty: 4 G | Refills: 4 | Status: SHIPPED | OUTPATIENT
Start: 2024-08-02

## 2024-08-02 ASSESSMENT — PAIN SCALES - GENERAL: PAINLEVEL: NO PAIN (0)

## 2024-08-02 NOTE — PATIENT INSTRUCTIONS
Please call our direct ILD nurses line for questions and concerns: [phone]    For scheduling, please call: [phone]

## 2024-08-02 NOTE — PROGRESS NOTES
[facility]  ILD Clinic - Return Visit -  8/2/2024         Assessment and Plan:   Wilbert Hutchins is a 64 year old male with a history of RA interstitial lung disease who presents for a return visit.       1) Rheumatoid lung, slowly progressive   - Indeterminate for UIP in the setting of rheumatoid, DDx include DIP and RA lung (favored diagnosis)  - He continues to smoke cigarettes but down to <10 cig/day, he has tried wellbutrin in the past but suffered with side effects. Declined chantix previously, he is reducing smoking but reports hesitantion to quit at this time given concern it may cause clinical decline  - Was referred to lung transplant and he declined it in December 2023 given concerns about risks and recovery process, his significant other helped him with the decision   - unsuccessful trial of Ofev; discontinued following severe diarrhea after 3 days  - reports that he started a new monthly injection with his [facility] rheumatology team, unable to tell what on chart review  - agree with pulmonary rehab referral placed by palliative care at their 7/12/24 visit  A) Supplemental oxygen  - Using portable oxygen with compliance, needs 4 LPM at rest and 6 LPM with activity. Educated to increase O2 to 6 LPM prior to activity, he can walk only 30 feet with O2 then very exhausted. Also educated to check his O2 during these episodes.   - Ok to drive to [place] with oxygen, recommended to make frequent stops with walks every 2-3 hours to reduce risk of DVT     B) Nocturnal oximetry  He is using oxygen at nighttime at 5 LPM, previously 4 LPM, declined sleep referral previoulsy, had sleep study 2018, AHI was 4.     2) Continued Tobacco Use  Trying to quit gradually, declined pharmacologic options per above.   - Encourage reduction as much as possible    3) GOLD D COPD   Overlapping with ILD, but symptoms are consistent with mucopurulent chronic bronchitis,  also consider bronchiectawsis. He brings up almost one cup of thick sputum throughout the day. Reports Anoro helping significantly in the morning to help cough up pphlegm and with dyspnea. Albuterol use throughout the rest of the day, somewhat effective. Plan to transition from albuterol rescue inhaler to combivent inhaler; given reported improvement with LAMA/LABA in the morning. Also prescribed aerobika device to assist with airway clearance.   - Continue Anoro Ellipta  - discontinue Albuterol   - start Combinvent   - azithromycin contraindicated 2/2 hydroxychlorquine  - no need for ICS given current systemic prednisone 10 mg daily  - Continue Daliresp 500 mcg po daily, experiencing symptomatic response with it     4) Immunocompromised (rituximab and prednisone)  With recent CD19 of 2   Up to date on pneumonia and flu vaccines    RTC: 6 months with spirometry and DLCO       I spent 50 minutes  total today, reviewing medical records including progress notes, multiple imaging studies from his previous available records, and documentation.      This patient was seen and discussed with attending physician, Dr. Suárez.     Trisha Hickey MD  PGY-2  Internal Medicine  [facility]             Problem List:     Interstitial lung disease  HRCT (May 31, 2019) Indeterminate for UIP;   Peripheral reticulation, mild paraseptal emphysema, GGO and peripheral reticulation and fibrosis basilar predominant. Mediastinal lymphadenopathy.  No air trapping on expiratory views. Repeat CT in 2023 did not show progress compared to 2022   Pulm: Dr. Machado previously ([facility])  Rheum: Dr. Salvador ([facility])  Treatment   On Prednisone since March 2018, current dose 10 mg daily   CellCept initiated in 2019 but could not tolerate due to exacerbation of arthralgias  On Plaquinil   Started RTX 3/2023, failed in late 2023  Reports recently starting a monthly injection, which he has had twice now through [facility],  "unable to tell what it is through chart review    GOLD D COPD   Treatment:  Continue Anoro Ellipta  discontinue Albuterol at 8/2024 visit, started combinvent for ISA effects  Pulmonary rehab start 8/2024    3. Tobacco and Marijuana use  A. Currently <1/2 pack per day   B. Daily Marijuana use     4. Migrating arthralgias  Positive RF (52) and Anti-CCP (76)  No erosive arthritis on right hand film (April 6, 2017)     5. Obesity (Body mass index is 33 kg/m ).        Interval History:     Wilbert Hutchins is a 63 year old male with interstitial lung disease who presents for a return visit.      Garry reports that he has been doing \"so-so\", reports that he has been very bothered by fatigue and shortness of breath with even minimal activity.  He has been waking up a lot at night coughing.  Denies PND no orthopnea.  Continues to have a large amount of sputum which she estimates to be about 1 cup a day, seems to be the worst in the morning after waking up.  The sputum is whitish-grayish in color and once in a while it is yellow.  He is still smoking about half a pack a day.  No recent respiratory illnesses or hospitalizations.  For oxygen use he is using 4 L during the day, 6 L during walks and recently increased the amount at night from 4-5.  He takes his Anoro inhaler once a day which he reports helps clear his chest out in the morning and wonders if he can take it more than once because it seems to be so effective.  He uses his albuterol rescue inhaler frequently throughout the day which provides a moderate amount of relief.  He continues his prednisone 10 mg daily for flu the last and hydroxychloroquine, his rheumatologist also started him on a new infusion.  He states that he recently saw palliative care who put in a referral for pulmonary rehab which he will be going to following this appointment.           Physical Exam:   /79   Pulse 63   Resp 17   Ht 1.778 m (5' 10\")   Wt 104.3 kg (230 lb)   SpO2 " 96%   BMI 33.00 kg/m      GENERAL: alert, NAD, in scooter   HEENT: NCAT, EOMI, MMM  Lungs: decreased air movement, pan-inspiratory crackles more prominent at bases  CV: RRR, S1S2, no murmurs noted, capillary refill of < 2s  Neuro: Alert and Oriented to situation, follows complex directions  Psychiatric: normal affect, good eye contact  Skin: no rash, jaundice or lesions on limited exam  Extremities: No cyanosis, clubbing in fingers, trace edema.         Imaging:     HRCT chest images from 11/1/2023 reviewed, agreed with radiology:   Findings:      Lungs: Upper lobe predominant paraseptal emphysema. Unchanged  bibasilar subpleural predominant reticular opacities and traction  bronchiectasis. No honeycombing. No air trapping or mosaic  attenuation. Stable pulmonary nodule including 6 mm right upper lobe  nodule on the right minor fissure series 3 image 135. Scattered  calcified granulomas.  Airways: Central tracheobronchial tree is clear.   Vessels: Main pulmonary artery and aorta are normal in caliber. Normal  three-vessel arch  Heart: Heart size is normal without pericardial effusion. Mild  coronary calcium.  Lymph nodes: No suspicious mediastinal or hilar lymphadenopathy.  Unchanged multiple subcentimeter mediastinal lymph nodes.  Thyroid: Imaged thyroid within normal limits.  Esophagus: Within normal limits     Upper abdomen: Evaluation of the upper abdomen is limited and without  contrast.     Bones and soft tissues: No suspicious axillary lymphadenopathy or soft  tissue mass. No suspicious osseous lesion.                                                                      Impression: Unchanged fibrotic interstitial lung disease since 2022.  Probable usual interstitial pneumonia              Pulmonary Function Tests:     My interpretation: Some mild evidence of restriction, decreased DLCO of 10.23, largely unchanged from prior PFTs in 2023.     Last Lung Function Testing 7/12/2024:

## 2024-08-02 NOTE — LETTER
8/2/2024      Wilbert Hutchins  [address]  [facility] [address]      Dear Colleague,    Thank you for referring your patient, Wilbert Hutchins, to the [facility] [place]. Please see a copy of my visit note below.    [facility]  ILD Clinic - Return Visit -  8/2/2024         Assessment and Plan:   Wilbert Hutchins is a 64 year old male with a history of RA interstitial lung disease who presents for a return visit.       1) Rheumatoid lung, slowly progressive   - Indeterminate for UIP in the setting of rheumatoid, DDx include DIP and RA lung (favored diagnosis)  - He continues to smoke cigarettes but down to <10 cig/day, he has tried wellbutrin in the past but suffered with side effects. Declined chantix previously, he is reducing smoking but reports hesitantion to quit at this time given concern it may cause clinical decline  - Was referred to lung transplant and he declined it in December 2023 given concerns about risks and recovery process, his significant other helped him with the decision   - unsuccessful trial of Ofev; discontinued following severe diarrhea after 3 days  - reports that he started a new monthly injection with his [facility] rheumatology team, unable to tell what on chart review  - agree with pulmonary rehab referral placed by palliative care at their 7/12/24 visit  A) Supplemental oxygen  - Using portable oxygen with compliance, needs 4 LPM at rest and 6 LPM with activity. Educated to increase O2 to 6 LPM prior to activity, he can walk only 30 feet with O2 then very exhausted. Also educated to check his O2 during these episodes.   - Ok to drive to [place] with oxygen, recommended to make frequent stops with walks every 2-3 hours to reduce risk of DVT     B) Nocturnal oximetry  He is using oxygen at nighttime at 5 LPM, previously 4 LPM, declined sleep  referral previoulsy, had sleep study 2018, AHI was 4.     2) Continued Tobacco Use  Trying to quit gradually, declined pharmacologic options per above.   - Encourage reduction as much as possible    3) GOLD D COPD   Overlapping with ILD, but symptoms are consistent with mucopurulent chronic bronchitis, also consider bronchiectawsis. He brings up almost one cup of thick sputum throughout the day. Reports Anoro helping significantly in the morning to help cough up pphlegm and with dyspnea. Albuterol use throughout the rest of the day, somewhat effective. Plan to transition from albuterol rescue inhaler to combivent inhaler; given reported improvement with LAMA/LABA in the morning. Also prescribed aerobika device to assist with airway clearance.   - Continue Anoro Ellipta  - discontinue Albuterol   - start Combinvent   - azithromycin contraindicated 2/2 hydroxychlorquine  - no need for ICS given current systemic prednisone 10 mg daily  - Continue Daliresp 500 mcg po daily, experiencing symptomatic response with it     4) Immunocompromised (rituximab and prednisone)  With recent CD19 of 2   Up to date on pneumonia and flu vaccines    RTC: 6 months with spirometry and DLCO       I spent 50 minutes  total today, reviewing medical records including progress notes, multiple imaging studies from his previous available records, and documentation.      This patient was seen and discussed with attending physician, Dr. Suárez.     Trisha Hickey MD  PGY-2  Internal Medicine  [facility]             Problem List:     Interstitial lung disease  HRCT (May 31, 2019) Indeterminate for UIP;   Peripheral reticulation, mild paraseptal emphysema, GGO and peripheral reticulation and fibrosis basilar predominant. Mediastinal lymphadenopathy.  No air trapping on expiratory views. Repeat CT in 2023 did not show progress compared to 2022   Pulm: Dr. Machado previously ([facility])  Rheum: Dr. Salvador ([place]  [facility])  Treatment   On Prednisone since March 2018, current dose 10 mg daily   CellCept initiated in 2019 but could not tolerate due to exacerbation of arthralgias  On Plaquinil   Started RTX 3/2023, failed in late 2023  Reports recently starting a monthly injection, which he has had twice now through [facility], unable to tell what it is through chart review    GOLD D COPD   Treatment:  Continue Anoro Ellipta  discontinue Albuterol at 8/2024 visit, started combinvent for ISA effects  Pulmonary rehab start 8/2024    3. Tobacco and Marijuana use  A. Currently <1/2 pack per day   B. Daily Marijuana use     4. Migrating arthralgias  Positive RF (52) and Anti-CCP (76)  No erosive arthritis on right hand film (April 6, 2017)     5. Obesity (Body mass index is 33 kg/m ).        Interval History:     Wilbert Hutchins is a 63 year old male with interstitial lung disease who presents for a return visit.      Garry reports that he has been doing \"so-so\", reports that he has been very bothered by fatigue and shortness of breath with even minimal activity.  He has been waking up a lot at night coughing.  Denies PND no orthopnea.  Continues to have a large amount of sputum which she estimates to be about 1 cup a day, seems to be the worst in the morning after waking up.  The sputum is whitish-grayish in color and once in a while it is yellow.  He is still smoking about half a pack a day.  No recent respiratory illnesses or hospitalizations.  For oxygen use he is using 4 L during the day, 6 L during walks and recently increased the amount at night from 4-5.  He takes his Anoro inhaler once a day which he reports helps clear his chest out in the morning and wonders if he can take it more than once because it seems to be so effective.  He uses his albuterol rescue inhaler frequently throughout the day which provides a moderate amount of relief.  He continues his prednisone 10 mg daily for flu the last and hydroxychloroquine, " "his rheumatologist also started him on a new infusion.  He states that he recently saw palliative care who put in a referral for pulmonary rehab which he will be going to following this appointment.           Physical Exam:   /79   Pulse 63   Resp 17   Ht 1.778 m (5' 10\")   Wt 104.3 kg (230 lb)   SpO2 96%   BMI 33.00 kg/m      GENERAL: alert, NAD, in scooter   HEENT: NCAT, EOMI, MMM  Lungs: decreased air movement, pan-inspiratory crackles more prominent at bases  CV: RRR, S1S2, no murmurs noted, capillary refill of < 2s  Neuro: Alert and Oriented to situation, follows complex directions  Psychiatric: normal affect, good eye contact  Skin: no rash, jaundice or lesions on limited exam  Extremities: No cyanosis, clubbing in fingers, trace edema.         Imaging:     HRCT chest images from 11/1/2023 reviewed, agreed with radiology:   Findings:      Lungs: Upper lobe predominant paraseptal emphysema. Unchanged  bibasilar subpleural predominant reticular opacities and traction  bronchiectasis. No honeycombing. No air trapping or mosaic  attenuation. Stable pulmonary nodule including 6 mm right upper lobe  nodule on the right minor fissure series 3 image 135. Scattered  calcified granulomas.  Airways: Central tracheobronchial tree is clear.   Vessels: Main pulmonary artery and aorta are normal in caliber. Normal  three-vessel arch  Heart: Heart size is normal without pericardial effusion. Mild  coronary calcium.  Lymph nodes: No suspicious mediastinal or hilar lymphadenopathy.  Unchanged multiple subcentimeter mediastinal lymph nodes.  Thyroid: Imaged thyroid within normal limits.  Esophagus: Within normal limits     Upper abdomen: Evaluation of the upper abdomen is limited and without  contrast.     Bones and soft tissues: No suspicious axillary lymphadenopathy or soft  tissue mass. No suspicious osseous lesion.                                                                      Impression: Unchanged fibrotic " interstitial lung disease since 2022.  Probable usual interstitial pneumonia              Pulmonary Function Tests:     My interpretation: Some mild evidence of restriction, decreased DLCO of 10.23, largely unchanged from prior PFTs in 2023.     Last Lung Function Testing 7/12/2024:                     Attestation signed by Matt Suárez MD at 8/2/2024  6:31 PM:  I saw and examined the patient with Resident.  Case discussed - agree with note.  We provided education for aerobika use and handed him one with written instructions to help airway clearance. Also changing albuteorl PRN to combivent PRN. Will have him return with PFTs in 6 months.   Matt Suárez M.D.    Total time spent today on patient encounter, documentation, review of chart and care coordination was 30 minutes.       Again, thank you for allowing me to participate in the care of your patient.        Sincerely,        Matt Suárez MD

## 2024-08-06 ENCOUNTER — HOSPITAL ENCOUNTER (OUTPATIENT)
Dept: CARDIAC REHAB | Facility: CLINIC | Age: 64
Discharge: HOME | End: 2024-08-06
Attending: INTERNAL MEDICINE
Payer: COMMERCIAL

## 2024-08-06 PROCEDURE — G0239 OTH RESP PROC, GROUP: HCPCS

## 2024-08-08 ENCOUNTER — HOSPITAL ENCOUNTER (OUTPATIENT)
Dept: CARDIAC REHAB | Facility: CLINIC | Age: 64
Discharge: HOME | End: 2024-08-08
Attending: INTERNAL MEDICINE
Payer: COMMERCIAL

## 2024-08-08 PROCEDURE — G0239 OTH RESP PROC, GROUP: HCPCS

## 2024-08-15 ENCOUNTER — HOSPITAL ENCOUNTER (OUTPATIENT)
Dept: CARDIAC REHAB | Facility: CLINIC | Age: 64
Discharge: HOME | End: 2024-08-15
Attending: INTERNAL MEDICINE
Payer: COMMERCIAL

## 2024-08-15 PROCEDURE — G0239 OTH RESP PROC, GROUP: HCPCS

## 2024-08-23 DIAGNOSIS — R06.02 SHORTNESS OF BREATH: ICD-10-CM

## 2024-08-23 DIAGNOSIS — J84.9 ILD (INTERSTITIAL LUNG DISEASE) (H): ICD-10-CM

## 2024-08-23 RX ORDER — MORPHINE SULFATE 15 MG/1
15 TABLET, FILM COATED, EXTENDED RELEASE ORAL EVERY 12 HOURS
Qty: 60 TABLET | Refills: 0 | Status: SHIPPED | OUTPATIENT
Start: 2024-08-23 | End: 2024-09-23

## 2024-08-23 NOTE — TELEPHONE ENCOUNTER
Received voicemail from patient requesting refill of ms contin.     Last refill: 7/24/2024  Last office visit: 7/12/2024  Scheduled for follow up 10/11/2024     Will route request to MD/ for review.     Reviewed [place]  Report.

## 2024-09-03 ENCOUNTER — HOSPITAL ENCOUNTER (OUTPATIENT)
Dept: CARDIAC REHAB | Facility: CLINIC | Age: 64
Discharge: HOME | End: 2024-09-03
Attending: INTERNAL MEDICINE
Payer: COMMERCIAL

## 2024-09-03 PROCEDURE — G0239 OTH RESP PROC, GROUP: HCPCS

## 2024-09-05 ENCOUNTER — HOSPITAL ENCOUNTER (OUTPATIENT)
Dept: CARDIAC REHAB | Facility: CLINIC | Age: 64
Discharge: HOME | End: 2024-09-05
Attending: INTERNAL MEDICINE
Payer: COMMERCIAL

## 2024-09-05 PROCEDURE — G0239 OTH RESP PROC, GROUP: HCPCS

## 2024-09-17 ENCOUNTER — HOSPITAL ENCOUNTER (OUTPATIENT)
Dept: CARDIAC REHAB | Facility: CLINIC | Age: 64
Discharge: HOME | End: 2024-09-17
Attending: INTERNAL MEDICINE
Payer: COMMERCIAL

## 2024-09-17 PROCEDURE — G0239 OTH RESP PROC, GROUP: HCPCS

## 2024-09-19 ENCOUNTER — HOSPITAL ENCOUNTER (OUTPATIENT)
Dept: CARDIAC REHAB | Facility: CLINIC | Age: 64
Discharge: HOME | End: 2024-09-19
Attending: INTERNAL MEDICINE
Payer: COMMERCIAL

## 2024-09-19 PROCEDURE — G0239 OTH RESP PROC, GROUP: HCPCS

## 2024-09-23 DIAGNOSIS — J84.9 ILD (INTERSTITIAL LUNG DISEASE) (H): ICD-10-CM

## 2024-09-23 DIAGNOSIS — R06.02 SHORTNESS OF BREATH: ICD-10-CM

## 2024-09-23 NOTE — TELEPHONE ENCOUNTER
Received voicemail from patient requesting refill of MS Contin.     Last refill: 8/26/2024  Last office visit: 7/12/2024  Scheduled for follow up 10/11/2024    Will route request to MD/ for review.     Reviewed [place]  Report.

## 2024-09-24 RX ORDER — MORPHINE SULFATE 15 MG/1
15 TABLET, FILM COATED, EXTENDED RELEASE ORAL EVERY 12 HOURS
Qty: 60 TABLET | Refills: 0 | Status: SHIPPED | OUTPATIENT
Start: 2024-09-24

## 2024-09-27 ENCOUNTER — TELEPHONE (OUTPATIENT)
Dept: PULMONOLOGY | Facility: CLINIC | Age: 64
End: 2024-09-27
Payer: COMMERCIAL

## 2024-09-27 NOTE — TELEPHONE ENCOUNTER
Patient confirmed scheduled appointment:  Date: 01/30/2025  Time: 1:00PM  Visit type: RIL  Provider: [place]  Location: [facility]  Testing/imaging: N/A  Additional notes: rescheduled from 2/7/25

## 2024-10-11 ENCOUNTER — ONCOLOGY VISIT (OUTPATIENT)
Dept: PALLIATIVE CARE | Facility: CLINIC | Age: 64
End: 2024-10-11
Attending: INTERNAL MEDICINE
Payer: COMMERCIAL

## 2024-10-11 VITALS
RESPIRATION RATE: 20 BRPM | SYSTOLIC BLOOD PRESSURE: 131 MMHG | HEART RATE: 51 BPM | OXYGEN SATURATION: 96 % | TEMPERATURE: 98.3 F | DIASTOLIC BLOOD PRESSURE: 85 MMHG

## 2024-10-11 DIAGNOSIS — J84.9 ILD (INTERSTITIAL LUNG DISEASE) (H): Primary | ICD-10-CM

## 2024-10-11 DIAGNOSIS — Z79.891 ENCOUNTER FOR LONG-TERM OPIATE ANALGESIC USE: ICD-10-CM

## 2024-10-11 DIAGNOSIS — R06.02 SHORTNESS OF BREATH: ICD-10-CM

## 2024-10-11 LAB — CREAT UR-MCNC: 304 MG/DL

## 2024-10-11 PROCEDURE — 80365 DRUG SCREENING OXYCODONE: CPT | Performed by: INTERNAL MEDICINE

## 2024-10-11 PROCEDURE — 99215 OFFICE O/P EST HI 40 MIN: CPT | Performed by: INTERNAL MEDICINE

## 2024-10-11 PROCEDURE — G0463 HOSPITAL OUTPT CLINIC VISIT: HCPCS | Performed by: INTERNAL MEDICINE

## 2024-10-11 PROCEDURE — G0480 DRUG TEST DEF 1-7 CLASSES: HCPCS | Performed by: INTERNAL MEDICINE

## 2024-10-11 PROCEDURE — 80354 DRUG SCREENING FENTANYL: CPT | Performed by: INTERNAL MEDICINE

## 2024-10-11 PROCEDURE — 80348 DRUG SCREENING BUPRENORPHINE: CPT | Performed by: INTERNAL MEDICINE

## 2024-10-11 ASSESSMENT — PAIN SCALES - GENERAL: PAINLEVEL: NO PAIN (0)

## 2024-10-11 NOTE — PROGRESS NOTES
"Palliative Care Outpatient Clinic      Patient ID:  Medical - He has ILD, chronic bronchitis, and undifferentiated connective tissue disease on 6 lpm (previously on 4L) O2. More recently described as RA. Not interested in lung transplant; smokes tobacco and cannabis daily. He has PTSD.  3/2024  started Ofev; stopped 2/2 diarrhea. Starts tocilizumab  Fall 2024  pulmonary rehabilitation; doesn't help     We have been following for dyspnea & care planning; Dr Oliveira started morphine-->morphineER 15mg bid Summer 2021.     Folowed with [facility]     Social - Lives with wife Eve. 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    Cannabis and tobacco use disorders: continues to smoke despite health consequences & ineligiblity for lung transplantation; considers his smoking as integral to his qol and is not interested in stopping. He has turned down transplant eval at least twice.  Estranged relationships with children \"their choice, not mine.\"   Has homecare nursing and case management via [employer] program via [employer].      Care Planning - +HCD on file, names Myra as his agent. DNR/DNI POLST completed 11/2021. Hospice education completed (he is not eligible currently). He has expressed interested in assisted suicide if ever legalized. Confirms he wants DNR/DNI order 9/2023. KNows he could die at anytime, may live for years.     Opioid Safety - +Naloxone  Last UDS 9/2023 as expected      History:  History gathered today from: patient, medical chart    No major changes  He did pulm rehab; didn't help  He is on tocilizumab via [facility] and feels it has helped a bit. Can get up and move around a bit more easily  Walks a block 1-2x a day. Needs to rest multiple times during the walk up and down the block.[    Dyspnea stable otherwise; on MSContin 15 mg bid; no side effects; mentally alert; no bowel issues    Mood ok overall. Cari DEL REAL is leaving but he doesn't want to continue " psychotherapy anyway. Discussed mood and coping at length.       PE: There were no vitals taken for this visit.   Wt Readings from Last 3 Encounters:   08/02/24 104.3 kg (230 lb)   12/22/23 104.3 kg (230 lb)   12/01/23 106.6 kg (235 lb)     Alert NAD      Data reviewed:  I reviewed recent labs and imaging, my comments:  Outside labs Mar--Cr 0.8, CO2 29     database reviewed:y      Impression & Recommendations:  63 yo with severe dyspnea from ILD    Continue MSContin 15 mg bid; no safety or side effect concerns I can identify  Mood and coping doing ok  Has ongoing follow-up with ILD clinic here and rheumatology at [facility]    Follow-up 3 mo    Annual UDS today    Over 40 minutes spent on the date of the encounter doing chart review, history and exam, patient education & counseling, documentation and other activities as noted above.    Thank you for involving us in the patient's care.   Jostin Peralta MD / Palliative Medicine / Text me via [employer]  This note may have been composed with voice recognition software and there may be mistranscriptions.

## 2024-10-11 NOTE — LETTER
"10/11/2024       RE: Wilbert Hutchins  [address]  [facility] [address]     Dear Colleague,    Thank you for referring your patient, Wilbert Hutchins, to the [facility] at [facility]. Please see a copy of my visit note below.    Palliative Care Outpatient Clinic      Patient ID:  Medical - He has ILD, chronic bronchitis, and undifferentiated connective tissue disease on 6 lpm (previously on 4L) O2. More recently described as RA. Not interested in lung transplant; smokes tobacco and cannabis daily. He has PTSD.  3/2024  started Ofev; stopped 2/2 diarrhea. Starts tocilizumab  Fall 2024  pulmonary rehabilitation; doesn't help     We have been following for dyspnea & care planning; Dr Oliveira started morphine-->morphineER 15mg bid Summer 2021.     Folowed with [employer]E[facility]     Social - Lives with wife Eve. 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    Cannabis and tobacco use disorders: continues to smoke despite health consequences & ineligiblity for lung transplantation; considers his smoking as integral to his qol and is not interested in stopping. He has turned down transplant eval at least twice.  Estranged relationships with children \"their choice, not mine.\"   Has homecare nursing and case management via [employer] program via [employer].      Care Planning - +[facility] on file, names Myra as his agent. DNR/DNI POLST completed 11/2021. Hospice education completed (he is not eligible currently). He has expressed interested in assisted suicide if ever legalized. Confirms he wants DNR/DNI order 9/2023. KNows he could die at anytime, may live for years.     Opioid Safety - +Naloxone  Last UDS 9/2023 as expected      History:  History gathered today from: patient, medical chart    No major changes  He did pulm rehab; didn't help  He is on tocilizumab via [facility] and feels it " has helped a bit. Can get up and move around a bit more easily  Walks a block 1-2x a day. Needs to rest multiple times during the walk up and down the block.[    Dyspnea stable otherwise; on MSContin 15 mg bid; no side effects; mentally alert; no bowel issues    Mood ok overall. Cari DEL REAL is leaving but he doesn't want to continue psychotherapy anyway. Discussed mood and coping at length.       PE: There were no vitals taken for this visit.   Wt Readings from Last 3 Encounters:   08/02/24 104.3 kg (230 lb)   12/22/23 104.3 kg (230 lb)   12/01/23 106.6 kg (235 lb)     Alert NAD      Data reviewed:  I reviewed recent labs and imaging, my comments:  Outside labs Mar--Cr 0.8, CO2 29     database reviewed:y      Impression & Recommendations:  65 yo with severe dyspnea from ILD    Continue MSContin 15 mg bid; no safety or side effect concerns I can identify  Mood and coping doing ok  Has ongoing follow-up with ILD clinic here and rheumatology at [facility]    Follow-up 3 mo    Annual UDS today    Over 40 minutes spent on the date of the encounter doing chart review, history and exam, patient education & counseling, documentation and other activities as noted above.    Thank you for involving us in the patient's care.   Jostin Peralta MD / Palliative Medicine / Text me via [employer]  This note may have been composed with voice recognition software and there may be mistranscriptions.      Again, thank you for allowing me to participate in the care of your patient.      Sincerely,    Jostin Peralta MD

## 2024-10-11 NOTE — NURSING NOTE
"Oncology Rooming Note    October 11, 2024 11:26 AM   Wilbert Hutchins is a 64 year old male who presents for:    Chief Complaint   Patient presents with    Oncology Clinic Visit     Palliative care     Initial Vitals: /85 (BP Location: Right arm, Patient Position: Sitting, Cuff Size: Adult Large)   Pulse 51   Temp 98.3  F (36.8  C) (Oral)   Resp 20   SpO2 96%  Estimated body mass index is 33 kg/m  as calculated from the following:    Height as of 8/2/24: 1.778 m (5' 10\").    Weight as of 8/2/24: 104.3 kg (230 lb). There is no height or weight on file to calculate BSA.  No Pain (0) Comment: Data Unavailable   No LMP for male patient.  Allergies reviewed: Yes  Medications reviewed: Yes    Medications: Medication refills not needed today.  Pharmacy name entered into [employer]:    [id number] - [address] [facility] - [place], [place] - [address]    Frailty Screening:   Is the patient here for a new oncology consult visit in cancer care? 2. No      Clinical concerns:  Has some bumps on left shoulder      Elizabeth Bertrand              "

## 2024-10-15 LAB
GABAPENTIN UR QL CFM: PRESENT
HYDROMORPHONE UR CFM-MCNC: 480 NG/ML
HYDROMORPHONE/CREAT UR: 158 NG/MG {CREAT}
MORPHINE UR CFM-MCNC: >7000 NG/ML
MORPHINE/CREAT UR: ABNORMAL

## 2024-10-21 DIAGNOSIS — R06.02 SHORTNESS OF BREATH: ICD-10-CM

## 2024-10-21 DIAGNOSIS — J84.9 ILD (INTERSTITIAL LUNG DISEASE) (H): ICD-10-CM

## 2024-10-21 NOTE — TELEPHONE ENCOUNTER
Received voicemail from patient requesting refill of morphine ER.     Last refill: 9/24/2024  Last office visit: 10/11/2024  Scheduled for follow up pending, message sent to scheduling   Will route request to MD/DO for review.     Reviewed [place]  Report.

## 2024-10-22 RX ORDER — MORPHINE SULFATE 15 MG/1
15 TABLET, FILM COATED, EXTENDED RELEASE ORAL EVERY 12 HOURS
Qty: 60 TABLET | Refills: 0 | Status: SHIPPED | OUTPATIENT
Start: 2024-10-22

## 2024-11-19 DIAGNOSIS — R06.02 SHORTNESS OF BREATH: ICD-10-CM

## 2024-11-19 DIAGNOSIS — J84.9 ILD (INTERSTITIAL LUNG DISEASE) (H): ICD-10-CM

## 2024-11-19 NOTE — TELEPHONE ENCOUNTER
Received voicemail from patient requesting refill of MS Contin.     Last refill: 10/26/2024  Last office visit: 10/11/2025  Scheduled for follow up 1/10/2025    Will route request to MD/ for review.     Reviewed [place]  Report.

## 2024-11-20 RX ORDER — MORPHINE SULFATE 15 MG/1
15 TABLET, FILM COATED, EXTENDED RELEASE ORAL EVERY 12 HOURS
Qty: 60 TABLET | Refills: 0 | Status: SHIPPED | OUTPATIENT
Start: 2024-11-23

## 2024-12-07 DIAGNOSIS — J44.9 COPD, GROUP D, BY GOLD 2017 CLASSIFICATION (H): ICD-10-CM

## 2024-12-09 RX ORDER — UMECLIDINIUM BROMIDE AND VILANTEROL TRIFENATATE 62.5; 25 UG/1; UG/1
1 POWDER RESPIRATORY (INHALATION) DAILY
Qty: 60 EACH | Refills: 0 | Status: SHIPPED | OUTPATIENT
Start: 2024-12-09

## 2024-12-23 DIAGNOSIS — R06.02 SHORTNESS OF BREATH: ICD-10-CM

## 2024-12-23 DIAGNOSIS — J84.9 ILD (INTERSTITIAL LUNG DISEASE) (H): ICD-10-CM

## 2024-12-23 DIAGNOSIS — J41.1 MUCOPURULENT CHRONIC BRONCHITIS (H): ICD-10-CM

## 2024-12-23 RX ORDER — MORPHINE SULFATE 15 MG/1
15 TABLET, FILM COATED, EXTENDED RELEASE ORAL EVERY 12 HOURS
Qty: 60 TABLET | Refills: 0 | Status: SHIPPED | OUTPATIENT
Start: 2024-12-23

## 2024-12-23 NOTE — TELEPHONE ENCOUNTER
Received voicemail from patient requesting refill of MS Contin.     Last refill: 11/23/2024  Last office visit: 10/11/2024  Scheduled for follow up 1/10/2025     Will route request to MD/ for review.     Reviewed [place]  Report.

## 2025-01-21 DIAGNOSIS — J44.9 COPD, GROUP D, BY GOLD 2017 CLASSIFICATION (H): ICD-10-CM

## 2025-01-21 RX ORDER — UMECLIDINIUM BROMIDE AND VILANTEROL TRIFENATATE 62.5; 25 UG/1; UG/1
1 POWDER RESPIRATORY (INHALATION) DAILY
Qty: 60 EACH | Refills: 3 | Status: SHIPPED | OUTPATIENT
Start: 2025-01-21

## 2025-01-29 NOTE — PROGRESS NOTES
[facility]  [facility]  ILD Clinic - Return Visit -  1/30/25         Assessment and Plan:   Wilbert Hutchins is a 64 year old male with a history of RA interstitial lung disease who presents for a return visit.       1) Rheumatoid lung, slowly progressive   - Indeterminate for UIP in the setting of rheumatoid, DDx include DIP and RA lung (favored diagnosis)  - He continues to smoke cigarettes but down to <10 cig/day, he has tried wellbutrin in the past but suffered with side effects. Declined chantix previously, he is reducing smoking but reports hesitantion to quit at this time given concern it may cause clinical decline. Sees palliative and receiving MS Contin with some relief in dyspnea   - Was referred to lung transplant and he declined it in December 2023 given concerns about risks and recovery process, his significant other helped him with the decision   - unsuccessful trial of Ofev; discontinued following severe diarrhea after 3 days  - Receiving tocilizumab monthly at [facility] rheumatology with some improvement in dyspnea   - Completed 12 sessions of pulmonary rehab but felt it was doing more harm than good, with severe palpitations and exhaustion so he stopped it recently, however PFTs showed an improvement   A) Supplemental oxygen  - Using portable oxygen with compliance, needs 4 LPM at rest and 6 LPM with activity.   B) Nocturnal oximetry  He is using oxygen at nighttime at 5 LPM, previously 4 LPM, declined sleep referral previoulsy, had sleep study 2018, AHI was 4.     2) Continued Tobacco Use  Trying to quit gradually, declined pharmacologic options per above. Seeing palliative care.     3) GOLD D COPD   Overlapping with ILD, but symptoms are consistent with mucopurulent chronic bronchitis, also consider bronchiectawsis. He brings up almost one cup of thick sputum throughout the day. Continues to use anoro which is helpful to him. Combivent use  throughout the rest of the day, which seems to be effective.   - Continue Anoro Ellipta  - azithromycin contraindicated 2/2 hydroxychlorquine  - no need for ICS given current systemic prednisone 10 mg daily  - Continue Daliresp 500 mcg po daily, experiencing symptomatic response with it   - For jaundice that could be secondary to hepatic congestion in his case, we will check CMP and direct bilirubin as well as NTproBNP. All labs resulted within normal.     4) Immunocompromised (tocilizumab and prednisone)    Up to date on pneumonia and flu vaccines    RTC: 6 months      I spent 30 minutes  total today, reviewing medical records including progress notes, multiple imaging studies from his previous available records, and documentation.                Problem List:     Interstitial lung disease  HRCT (May 31, 2019) Indeterminate for UIP;   Peripheral reticulation, mild paraseptal emphysema, GGO and peripheral reticulation and fibrosis basilar predominant. Mediastinal lymphadenopathy.  No air trapping on expiratory views. Repeat CT in 2023 did not show progress compared to 2022   Pulm: Dr. Machado previously ([facility])  Rheum: Dr. Salvador ([facility])  Treatment   On Prednisone since March 2018, current dose 10 mg daily   CellCept initiated in 2019 but could not tolerate due to exacerbation of arthralgias  On Plaquinil   Started RTX 3/2023, failed in late 2023  Reports recently starting a monthly injection, which he has had twice now through [facility], unable to tell what it is through chart review    GOLD D COPD   Treatment:  Continue Anoro Ellipta  discontinue Albuterol at 8/2024 visit, started combinvent for ISA effects  Pulmonary rehab start 8/2024    3. Tobacco and Marijuana use  A. Currently <1/2 pack per day   B. Daily Marijuana use     4. Migrating arthralgias  Positive RF (52) and Anti-CCP (76)  No erosive arthritis on right hand film (April 6, 2017)     5. Obesity (Body mass index is 33 kg/m ).      "   Interval History:     Wilbert Hutchins is a 64 year old male with interstitial lung disease who presents for a return visit.      Garry reports that he has been doing \"so-so\", reports that he has been very bothered by fatigue and shortness of breath with even minimal activity.  He has been waking up a lot at night coughing.  Denies PND no orthopnea.  Continues to have a large amount of sputum which she estimates to be about 1 cup a day, seems to be the worst in the morning after waking up.  The sputum is whitish-grayish in color and once in a while it is yellow.  He is still smoking about half a pack a day.  No recent respiratory illnesses or hospitalizations.  For oxygen use he is using 4 L during the day, 6 L during walks and recently increased the amount at night from 4-5.  He takes his Anoro inhaler once a day which he reports helps clear his chest out in the morning and wonders if he can take it more than once because it seems to be so effective.  He uses his albuterol rescue inhaler frequently throughout the day which provides a moderate amount of relief.  He continues his prednisone 10 mg daily for flu the last and hydroxychloroquine, his rheumatologist also started him on a new infusion.  He states that he recently saw palliative care who put in a referral for pulmonary rehab which he will be going to following this appointment.    Interval updates 1/30/25:  No change overall but cough more with cold air/weather  Pulm rehab made him worse, did 12 sessions then stopped. Still using 6 LPM with activity, 4 LPM at rest, 6 LPM constant at home   Still smoking, excessive cough with smoking, 1/2 ppd.   Feels better with tocilzumab, next infusion on 2/14/25, he is receiving it monthly since summer 2024 at [facility] rheumatology.                Physical Exam:   /76   Pulse 57   Resp 18   Ht 1.778 m (5' 10\")   Wt 101.6 kg (224 lb)   SpO2 97%   BMI 32.14 kg/m      GENERAL: alert, NAD, in scooter "   HEENT: NCAT, EOMI, jaundice   Lungs: decreased air movement, pan-inspiratory crackles more prominent at bases  CV: RRR, S1S2, no murmurs noted, capillary refill of < 2s  Neuro: Alert and Oriented to situation, follows complex directions  Psychiatric: normal affect, good eye contact  Skin: no rash, jaundice or lesions on limited exam  Extremities: No cyanosis, clubbing in fingers, trace edema.         Imaging:     HRCT chest images from 11/1/2023 reviewed, agreed with radiology:   Findings:      Lungs: Upper lobe predominant paraseptal emphysema. Unchanged  bibasilar subpleural predominant reticular opacities and traction  bronchiectasis. No honeycombing. No air trapping or mosaic  attenuation. Stable pulmonary nodule including 6 mm right upper lobe  nodule on the right minor fissure series 3 image 135. Scattered  calcified granulomas.  Airways: Central tracheobronchial tree is clear.   Vessels: Main pulmonary artery and aorta are normal in caliber. Normal  three-vessel arch  Heart: Heart size is normal without pericardial effusion. Mild  coronary calcium.  Lymph nodes: No suspicious mediastinal or hilar lymphadenopathy.  Unchanged multiple subcentimeter mediastinal lymph nodes.  Thyroid: Imaged thyroid within normal limits.  Esophagus: Within normal limits     Upper abdomen: Evaluation of the upper abdomen is limited and without  contrast.     Bones and soft tissues: No suspicious axillary lymphadenopathy or soft  tissue mass. No suspicious osseous lesion.                                                                      Impression: Unchanged fibrotic interstitial lung disease since 2022.  Probable usual interstitial pneumonia              Pulmonary Function Tests:     My interpretation: Some mild evidence of restriction, decreased DLCO of 10.23, largely unchanged from prior PFTs in 2023.     PFTs today 1/30/25:             Wt Readings from Last 4 Encounters:   01/30/25 101.6 kg (224 lb)   01/10/25 101.2 kg  (223 lb)   08/02/24 104.3 kg (230 lb)   12/22/23 104.3 kg (230 lb)

## 2025-01-30 ENCOUNTER — OFFICE VISIT (OUTPATIENT)
Dept: PULMONOLOGY | Facility: CLINIC | Age: 65
End: 2025-01-30
Attending: INTERNAL MEDICINE
Payer: COMMERCIAL

## 2025-01-30 ENCOUNTER — LAB (OUTPATIENT)
Dept: LAB | Facility: CLINIC | Age: 65
End: 2025-01-30
Payer: COMMERCIAL

## 2025-01-30 VITALS
HEART RATE: 57 BPM | SYSTOLIC BLOOD PRESSURE: 120 MMHG | DIASTOLIC BLOOD PRESSURE: 76 MMHG | OXYGEN SATURATION: 97 % | BODY MASS INDEX: 32.07 KG/M2 | RESPIRATION RATE: 18 BRPM | WEIGHT: 224 LBS | HEIGHT: 70 IN

## 2025-01-30 DIAGNOSIS — R17 JAUNDICE: Primary | ICD-10-CM

## 2025-01-30 DIAGNOSIS — J41.1 MUCOPURULENT CHRONIC BRONCHITIS (H): ICD-10-CM

## 2025-01-30 DIAGNOSIS — R06.09 DOE (DYSPNEA ON EXERTION): ICD-10-CM

## 2025-01-30 DIAGNOSIS — R17 JAUNDICE: ICD-10-CM

## 2025-01-30 LAB
ALBUMIN SERPL BCG-MCNC: 4.3 G/DL (ref 3.5–5.2)
ALP SERPL-CCNC: 57 U/L (ref 40–150)
ALT SERPL W P-5'-P-CCNC: 26 U/L (ref 0–70)
ANION GAP SERPL CALCULATED.3IONS-SCNC: 6 MMOL/L (ref 7–15)
AST SERPL W P-5'-P-CCNC: 20 U/L (ref 0–45)
BASOPHILS # BLD AUTO: 0 10E3/UL (ref 0–0.2)
BASOPHILS NFR BLD AUTO: 0 %
BILIRUB DIRECT SERPL-MCNC: <0.2 MG/DL (ref 0–0.3)
BILIRUB SERPL-MCNC: 0.3 MG/DL
BUN SERPL-MCNC: 13.2 MG/DL (ref 8–23)
CALCIUM SERPL-MCNC: 9.2 MG/DL (ref 8.8–10.4)
CHLORIDE SERPL-SCNC: 102 MMOL/L (ref 98–107)
CREAT SERPL-MCNC: 0.88 MG/DL (ref 0.67–1.17)
EGFRCR SERPLBLD CKD-EPI 2021: >90 ML/MIN/1.73M2
EOSINOPHIL # BLD AUTO: 0.3 10E3/UL (ref 0–0.7)
EOSINOPHIL NFR BLD AUTO: 3 %
ERYTHROCYTE [DISTWIDTH] IN BLOOD BY AUTOMATED COUNT: 12.5 % (ref 10–15)
EXPTIME-PRE: 8.62 SEC
FEF2575-%PRED-PRE: 73 %
FEF2575-PRE: 1.97 L/SEC
FEF2575-PRED: 2.68 L/SEC
FEFMAX-%PRED-PRE: 84 %
FEFMAX-PRE: 7.6 L/SEC
FEFMAX-PRED: 9 L/SEC
FEV1-%PRED-PRE: 77 %
FEV1-PRE: 2.52 L
FEV1FEV6-PRE: 78 %
FEV1FEV6-PRED: 79 %
FEV1FVC-PRE: 76 %
FEV1FVC-PRED: 78 %
FIFMAX-PRE: 6.44 L/SEC
FVC-%PRED-PRE: 79 %
FVC-PRE: 3.33 L
FVC-PRED: 4.17 L
GLUCOSE SERPL-MCNC: 135 MG/DL (ref 70–99)
HCO3 SERPL-SCNC: 29 MMOL/L (ref 22–29)
HCT VFR BLD AUTO: 40.2 % (ref 40–53)
HGB BLD-MCNC: 13 G/DL (ref 13.3–17.7)
IMM GRANULOCYTES # BLD: 0 10E3/UL
IMM GRANULOCYTES NFR BLD: 0 %
LYMPHOCYTES # BLD AUTO: 0.8 10E3/UL (ref 0.8–5.3)
LYMPHOCYTES NFR BLD AUTO: 9 %
MCH RBC QN AUTO: 28 PG (ref 26.5–33)
MCHC RBC AUTO-ENTMCNC: 32.3 G/DL (ref 31.5–36.5)
MCV RBC AUTO: 87 FL (ref 78–100)
MONOCYTES # BLD AUTO: 0.5 10E3/UL (ref 0–1.3)
MONOCYTES NFR BLD AUTO: 5 %
NEUTROPHILS # BLD AUTO: 7.7 10E3/UL (ref 1.6–8.3)
NEUTROPHILS NFR BLD AUTO: 82 %
NRBC # BLD AUTO: 0 10E3/UL
NRBC BLD AUTO-RTO: 0 /100
NT-PROBNP SERPL-MCNC: 65 PG/ML (ref 0–900)
PLATELET # BLD AUTO: 232 10E3/UL (ref 150–450)
POTASSIUM SERPL-SCNC: 5 MMOL/L (ref 3.4–5.3)
PROT SERPL-MCNC: 6.7 G/DL (ref 6.4–8.3)
RBC # BLD AUTO: 4.64 10E6/UL (ref 4.4–5.9)
SODIUM SERPL-SCNC: 137 MMOL/L (ref 135–145)
WBC # BLD AUTO: 9.3 10E3/UL (ref 4–11)

## 2025-01-30 PROCEDURE — 83880 ASSAY OF NATRIURETIC PEPTIDE: CPT | Performed by: PATHOLOGY

## 2025-01-30 PROCEDURE — 82248 BILIRUBIN DIRECT: CPT | Performed by: PATHOLOGY

## 2025-01-30 PROCEDURE — G0463 HOSPITAL OUTPT CLINIC VISIT: HCPCS | Performed by: INTERNAL MEDICINE

## 2025-01-30 PROCEDURE — 36415 COLL VENOUS BLD VENIPUNCTURE: CPT | Performed by: PATHOLOGY

## 2025-01-30 PROCEDURE — 80053 COMPREHEN METABOLIC PANEL: CPT | Performed by: PATHOLOGY

## 2025-01-30 PROCEDURE — 85025 COMPLETE CBC W/AUTO DIFF WBC: CPT | Performed by: PATHOLOGY

## 2025-01-30 ASSESSMENT — PAIN SCALES - GENERAL: PAINLEVEL_OUTOF10: NO PAIN (0)

## 2025-01-30 NOTE — PATIENT INSTRUCTIONS
Please call our direct ILD nurses line for questions and concerns: [phone]    For scheduling, please call: [phone]

## 2025-01-30 NOTE — LETTER
1/30/2025      Wilbert Hutchins  [address]  [place] [facility] [id number]      Dear Colleague,    Thank you for referring your patient, Wilbert Hutchins, to the [facility] [place]. Please see a copy of my visit note below.    [facility]  ILD Clinic - Return Visit -  1/30/25         Assessment and Plan:   Wilbert Hutchins is a 64 year old male with a history of RA interstitial lung disease who presents for a return visit.       1) Rheumatoid lung, slowly progressive   - Indeterminate for UIP in the setting of rheumatoid, DDx include DIP and RA lung (favored diagnosis)  - He continues to smoke cigarettes but down to <10 cig/day, he has tried wellbutrin in the past but suffered with side effects. Declined chantix previously, he is reducing smoking but reports hesitantion to quit at this time given concern it may cause clinical decline. Sees palliative and receiving MS Contin with some relief in dyspnea   - Was referred to lung transplant and he declined it in December 2023 given concerns about risks and recovery process, his significant other helped him with the decision   - unsuccessful trial of Ofev; discontinued following severe diarrhea after 3 days  - Receiving tocilizumab monthly at [facility] rheumatology with some improvement in dyspnea   - Completed 12 sessions of pulmonary rehab but felt it was doing more harm than good, with severe palpitations and exhaustion so he stopped it recently, however PFTs showed an improvement   A) Supplemental oxygen  - Using portable oxygen with compliance, needs 4 LPM at rest and 6 LPM with activity.   B) Nocturnal oximetry  He is using oxygen at nighttime at 5 LPM, previously 4 LPM, declined sleep referral previoulsy, had sleep study 2018, AHI was 4.     2) Continued Tobacco Use  Trying to quit gradually, declined pharmacologic options per above.  Seeing palliative care.     3) GOLD D COPD   Overlapping with ILD, but symptoms are consistent with mucopurulent chronic bronchitis, also consider bronchiectawsis. He brings up almost one cup of thick sputum throughout the day. Continues to use anoro which is helpful to him. Combivent use throughout the rest of the day, which seems to be effective.   - Continue Anoro Ellipta  - azithromycin contraindicated 2/2 hydroxychlorquine  - no need for ICS given current systemic prednisone 10 mg daily  - Continue Daliresp 500 mcg po daily, experiencing symptomatic response with it   - For jaundice that could be secondary to hepatic congestion in his case, we will check CMP and direct bilirubin as well as NTproBNP. All labs resulted within normal.     4) Immunocompromised (tocilizumab and prednisone)    Up to date on pneumonia and flu vaccines    RTC: 6 months      I spent 30 minutes  total today, reviewing medical records including progress notes, multiple imaging studies from his previous available records, and documentation.                Problem List:     Interstitial lung disease  HRCT (May 31, 2019) Indeterminate for UIP;   Peripheral reticulation, mild paraseptal emphysema, GGO and peripheral reticulation and fibrosis basilar predominant. Mediastinal lymphadenopathy.  No air trapping on expiratory views. Repeat CT in 2023 did not show progress compared to 2022   Pulm: Dr. Machado previously ([facility])  Rheum: Dr. Salvador ([facility])  Treatment   On Prednisone since March 2018, current dose 10 mg daily   CellCept initiated in 2019 but could not tolerate due to exacerbation of arthralgias  On Plaquinil   Started RTX 3/2023, failed in late 2023  Reports recently starting a monthly injection, which he has had twice now [place] [facility], unable to tell what it is through chart review    GOLD D COPD   Treatment:  Continue Anoro Ellipta  discontinue Albuterol at 8/2024 visit, started combinvent for ISA  "effects  Pulmonary rehab start 8/2024    3. Tobacco and Marijuana use  A. Currently <1/2 pack per day   B. Daily Marijuana use     4. Migrating arthralgias  Positive RF (52) and Anti-CCP (76)  No erosive arthritis on right hand film (April 6, 2017)     5. Obesity (Body mass index is 33 kg/m ).        Interval History:     Wilbert Hutchins is a 64 year old male with interstitial lung disease who presents for a return visit.      Garry reports that he has been doing \"so-so\", reports that he has been very bothered by fatigue and shortness of breath with even minimal activity.  He has been waking up a lot at night coughing.  Denies PND no orthopnea.  Continues to have a large amount of sputum which she estimates to be about 1 cup a day, seems to be the worst in the morning after waking up.  The sputum is whitish-grayish in color and once in a while it is yellow.  He is still smoking about half a pack a day.  No recent respiratory illnesses or hospitalizations.  For oxygen use he is using 4 L during the day, 6 L during walks and recently increased the amount at night from 4-5.  He takes his Anoro inhaler once a day which he reports helps clear his chest out in the morning and wonders if he can take it more than once because it seems to be so effective.  He uses his albuterol rescue inhaler frequently throughout the day which provides a moderate amount of relief.  He continues his prednisone 10 mg daily for flu the last and hydroxychloroquine, his rheumatologist also started him on a new infusion.  He states that he recently saw palliative care who put in a referral for pulmonary rehab which he will be going to following this appointment.    Interval updates 1/30/25:  No change overall but cough more with cold air/weather  Pulm rehab made him worse, did 12 sessions then stopped. Still using 6 LPM with activity, 4 LPM at rest, 6 LPM constant at home   Still smoking, excessive cough with smoking, 1/2 ppd.   Feels " "better with tocilzumab, next infusion on 2/14/25, he is receiving it monthly since summer 2024 at [facility]ology.                Physical Exam:   /76   Pulse 57   Resp 18   Ht 1.778 m (5' 10\")   Wt 101.6 kg (224 lb)   SpO2 97%   BMI 32.14 kg/m      GENERAL: alert, NAD, in scooter   HEENT: NCAT, EOMI, jaundice   Lungs: decreased air movement, pan-inspiratory crackles more prominent at bases  CV: RRR, S1S2, no murmurs noted, capillary refill of < 2s  Neuro: Alert and Oriented to situation, follows complex directions  Psychiatric: normal affect, good eye contact  Skin: no rash, jaundice or lesions on limited exam  Extremities: No cyanosis, clubbing in fingers, trace edema.         Imaging:     HRCT chest images from 11/1/2023 reviewed, agreed with radiology:   Findings:      Lungs: Upper lobe predominant paraseptal emphysema. Unchanged  bibasilar subpleural predominant reticular opacities and traction  bronchiectasis. No honeycombing. No air trapping or mosaic  attenuation. Stable pulmonary nodule including 6 mm right upper lobe  nodule on the right minor fissure series 3 image 135. Scattered  calcified granulomas.  Airways: Central tracheobronchial tree is clear.   Vessels: Main pulmonary artery and aorta are normal in caliber. Normal  three-vessel arch  Heart: Heart size is normal without pericardial effusion. Mild  coronary calcium.  Lymph nodes: No suspicious mediastinal or hilar lymphadenopathy.  Unchanged multiple subcentimeter mediastinal lymph nodes.  Thyroid: Imaged thyroid within normal limits.  Esophagus: Within normal limits     Upper abdomen: Evaluation of the upper abdomen is limited and without  contrast.     Bones and soft tissues: No suspicious axillary lymphadenopathy or soft  tissue mass. No suspicious osseous lesion.                                                                      Impression: Unchanged fibrotic interstitial lung disease since 2022.  Probable usual interstitial " pneumonia              Pulmonary Function Tests:     My interpretation: Some mild evidence of restriction, decreased DLCO of 10.23, largely unchanged from prior PFTs in 2023.     PFTs today 1/30/25:             Wt Readings from Last 4 Encounters:   01/30/25 101.6 kg (224 lb)   01/10/25 101.2 kg (223 lb)   08/02/24 104.3 kg (230 lb)   12/22/23 104.3 kg (230 lb)                     Again, thank you for allowing me to participate in the care of your patient.        Sincerely,        Matt Suárez MD    Electronically signed

## 2025-01-30 NOTE — PROGRESS NOTES
Wilbert Hutchins comes into clinic today at the request of Dr Suárez , for spirometry     Tolerated testing well. No adverse reactions. Left lab in no distress.        LORENA DEVI

## 2025-01-30 NOTE — NURSING NOTE
Chief Complaint   Patient presents with    Interstitial Lung Disease (ILD)     Follow up visit     Balwinder Sousa CMA CMA at 12:46 PM on 1/30/2025

## 2025-02-17 DIAGNOSIS — J84.9 ILD (INTERSTITIAL LUNG DISEASE) (H): ICD-10-CM

## 2025-02-17 DIAGNOSIS — R06.02 SHORTNESS OF BREATH: ICD-10-CM

## 2025-02-17 DIAGNOSIS — J44.9 COPD, GROUP D, BY GOLD 2017 CLASSIFICATION (H): ICD-10-CM

## 2025-02-17 DIAGNOSIS — J41.1 MUCOPURULENT CHRONIC BRONCHITIS (H): ICD-10-CM

## 2025-02-17 RX ORDER — ROFLUMILAST 500 UG/1
TABLET ORAL
Qty: 90 TABLET | Refills: 0 | Status: SHIPPED | OUTPATIENT
Start: 2025-02-17

## 2025-02-17 NOTE — TELEPHONE ENCOUNTER
Received telephone call from patient requesting refill of MS Contin.     Last refill: 1/24/2025 (#48)  Last office visit: 1/10/2025  Scheduled for follow up pending per check out request     Will route request to MD/ for review.     Reviewed [place]  Report.

## 2025-02-18 RX ORDER — MORPHINE SULFATE 15 MG/1
15 TABLET, FILM COATED, EXTENDED RELEASE ORAL EVERY 12 HOURS
Qty: 60 TABLET | Refills: 0 | Status: SHIPPED | OUTPATIENT
Start: 2025-02-21 | End: 2025-02-19

## 2025-02-19 RX ORDER — MORPHINE SULFATE 15 MG/1
15 TABLET, FILM COATED, EXTENDED RELEASE ORAL EVERY 12 HOURS
Qty: 60 TABLET | Refills: 0 | Status: SHIPPED | OUTPATIENT
Start: 2025-02-19

## 2025-02-24 RX ORDER — MORPHINE SULFATE 15 MG/1
15 TABLET, FILM COATED, EXTENDED RELEASE ORAL EVERY 12 HOURS
Qty: 48 TABLET | Refills: 0 | OUTPATIENT
Start: 2025-02-24

## 2025-03-17 DIAGNOSIS — J84.9 ILD (INTERSTITIAL LUNG DISEASE) (H): ICD-10-CM

## 2025-03-17 DIAGNOSIS — R06.02 SHORTNESS OF BREATH: ICD-10-CM

## 2025-03-18 RX ORDER — MORPHINE SULFATE 15 MG/1
15 TABLET, FILM COATED, EXTENDED RELEASE ORAL EVERY 12 HOURS
Qty: 60 TABLET | Refills: 0 | Status: SHIPPED | OUTPATIENT
Start: 2025-03-18 | End: 2025-03-18

## 2025-03-18 RX ORDER — MORPHINE SULFATE 15 MG/1
15 TABLET, FILM COATED, EXTENDED RELEASE ORAL EVERY 12 HOURS
Qty: 60 TABLET | Refills: 0 | Status: SHIPPED | OUTPATIENT
Start: 2025-03-18

## 2025-03-22 ENCOUNTER — HEALTH MAINTENANCE LETTER (OUTPATIENT)
Age: 65
End: 2025-03-22

## 2025-04-15 DIAGNOSIS — J84.9 ILD (INTERSTITIAL LUNG DISEASE) (H): ICD-10-CM

## 2025-04-15 DIAGNOSIS — R06.02 SHORTNESS OF BREATH: ICD-10-CM

## 2025-04-15 RX ORDER — MORPHINE SULFATE 15 MG/1
15 TABLET, FILM COATED, EXTENDED RELEASE ORAL EVERY 12 HOURS
Qty: 60 TABLET | Refills: 0 | Status: SHIPPED | OUTPATIENT
Start: 2025-04-17

## 2025-04-15 NOTE — TELEPHONE ENCOUNTER
Received telephone call from patient requesting refill of MS Contin.     Last refill: 3/20/2025  Last office visit: 1/10/2025  Scheduled for follow up pending, message sent to scheduling        Will route request to MD/DO for review.     Reviewed [place]  Report.

## 2025-04-30 ENCOUNTER — OFFICE VISIT (OUTPATIENT)
Dept: PALLIATIVE CARE | Facility: CLINIC | Age: 65
End: 2025-04-30
Attending: INTERNAL MEDICINE
Payer: COMMERCIAL

## 2025-04-30 VITALS
HEART RATE: 56 BPM | OXYGEN SATURATION: 98 % | WEIGHT: 223 LBS | SYSTOLIC BLOOD PRESSURE: 145 MMHG | BODY MASS INDEX: 32 KG/M2 | DIASTOLIC BLOOD PRESSURE: 80 MMHG | TEMPERATURE: 98.8 F

## 2025-04-30 DIAGNOSIS — J84.9 ILD (INTERSTITIAL LUNG DISEASE) (H): Primary | ICD-10-CM

## 2025-04-30 DIAGNOSIS — L98.9 SKIN LESION: ICD-10-CM

## 2025-04-30 DIAGNOSIS — J41.1 MUCOPURULENT CHRONIC BRONCHITIS (H): ICD-10-CM

## 2025-04-30 DIAGNOSIS — R06.02 SHORTNESS OF BREATH: ICD-10-CM

## 2025-04-30 PROCEDURE — 1126F AMNT PAIN NOTED NONE PRSNT: CPT | Performed by: INTERNAL MEDICINE

## 2025-04-30 PROCEDURE — G2211 COMPLEX E/M VISIT ADD ON: HCPCS | Performed by: INTERNAL MEDICINE

## 2025-04-30 PROCEDURE — 99215 OFFICE O/P EST HI 40 MIN: CPT | Performed by: INTERNAL MEDICINE

## 2025-04-30 PROCEDURE — 3079F DIAST BP 80-89 MM HG: CPT | Performed by: INTERNAL MEDICINE

## 2025-04-30 PROCEDURE — G0463 HOSPITAL OUTPT CLINIC VISIT: HCPCS | Performed by: INTERNAL MEDICINE

## 2025-04-30 PROCEDURE — 3077F SYST BP >= 140 MM HG: CPT | Performed by: INTERNAL MEDICINE

## 2025-04-30 RX ORDER — IPRATROPIUM BROMIDE AND ALBUTEROL 20; 100 UG/1; UG/1
1 SPRAY, METERED RESPIRATORY (INHALATION) 4 TIMES DAILY PRN
Qty: 4 G | Refills: 0 | Status: SHIPPED | OUTPATIENT
Start: 2025-04-30

## 2025-04-30 ASSESSMENT — PAIN SCALES - GENERAL: PAINLEVEL_OUTOF10: NO PAIN (0)

## 2025-04-30 NOTE — PROGRESS NOTES
"Palliative Care Outpatient Clinic      Patient ID:  Medical - He has ILD, mucopurulent chronic bronchitis, and rheumatoid arthritis, on 6 lpm (previously on 4L) O2.  Not interested in lung transplant; smokes tobacco and cannabis daily and would rather continue that. He has PTSD.  3/2024  started Ofev; stopped 2/2 diarrhea. Starts tocilizumab  Fall 2024 pulmonary rehabilitation; doesn't help     We have been following for dyspnea & care planning; Dr Oliveira started morphine-->morphineER 15mg bid Summer 2021.     Folowed with SAWYER HOWELL     Social - Lives with wife Eve. 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    Cannabis and tobacco use disorders: continues to smoke despite health consequences & ineligiblity for lung transplantation; considers his smoking as integral to his qol and is not interested in stopping. He has turned down transplant eval at least twice.  Estranged relationships with children \"their choice, not mine.\"   Has homecare nursing and case management via [employer] program via [employer].      Care Planning - +HCD on file, names Myra as his agent. DNR/DNI POLST completed 11/2021. Hospice education completed (he is not eligible currently). He has expressed interested in assisted suicide if ever legalized. Confirms he wants DNR/DNI order 9/2023. KNows he could die at anytime, may live for years.     Opioid Safety - +Naloxone  Last UDS 9/2023 as expected      History:  History gathered today from: patient, medical chart; [facility] chart    Chart review; since I saw him he saw Dr Suárez--nothing new. Remains on tocilizumab per his [facility] rheumatologist. Saw him in March; stable; on hydroxychloroquine and prednisone too.     He reports he feels he's continuing to be a little worse; O2 sats when he exerts now 85% on 6 lpm. More congestion. Had the ducts cleaned out in their home this week, hoping that helps. More congestion. No major health events though. " Feels morphine mitigates dyspnea somewhat. Feels more dyspneic if he runs out, misses a dose which happens on occasion when pharmacy can't fill in time. No constipation, no side effects.    Looking forward to summer--sitting in sun.      PE: There were no vitals taken for this visit.   Wt Readings from Last 3 Encounters:   01/30/25 101.6 kg (224 lb)   01/10/25 101.2 kg (223 lb)   08/02/24 104.3 kg (230 lb)     Alert NAD      Data reviewed:  I reviewed recent labs and imaging, my comments:  CO2 29  Cr 0.88  LFT nl  CBC nl except Hgb 13     database reviewed: y      Impression & Recommendations:  64 yo with chronic hypoxic respiratory failure from ILD on chronic MSContin for dyspnea    Dyspnea  No changes rec'd  His sx slightly slowly worsening overall    He has a rough hyperkeratotic L scalp/temporal skin lesion I looked at--rough--needs to see a dermatologist and he knows that. D/w him today. He sees PCP tomorrow too.     Encouraged he reestablish with SAWYER Jessica for psychotherapy. Discussed mood, demoralization, and coping    Follow-up 3 mo    40 minutes spent on the date of the encounter doing chart review, history and exam, patient education & counseling, documentation and other activities as noted above. The longitudinal plan of care for the diagnosis(es)/condition(s) as documented were addressed during this visit. Due to the added complexity in care, I will continue to support Garry in the subsequent management and with ongoing continuity of care.    Thank you for involving us in the patient's care.   Jostin Peralta MD / Palliative Medicine / Text me via [employer]  This note may have been composed with voice recognition software and there may be mistranscriptions.

## 2025-04-30 NOTE — LETTER
"4/30/2025       RE: Wilbert Hutchins  [address]  [facility] [address]     Dear Colleague,    Thank you for referring your patient, Wilbert Hutchins, to the [facility] at [facility]. Please see a copy of my visit note below.    Palliative Care Outpatient Clinic      Patient ID:  Medical - He has ILD, mucopurulent chronic bronchitis, and rheumatoid arthritis, on 6 lpm (previously on 4L) O2.  Not interested in lung transplant; smokes tobacco and cannabis daily and would rather continue that. He has PTSD.  3/2024  started Ofev; stopped 2/2 diarrhea. Starts tocilizumab  Fall 2024 pulmonary rehabilitation; doesn't help     We have been following for dyspnea & care planning; Dr Oliveira started morphine-->morphineER 15mg bid Summer 2021.     Folowed with SAWYER HOWELL     Social - Lives with wife Eve. 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    Cannabis and tobacco use disorders: continues to smoke despite health consequences & ineligiblity for lung transplantation; considers his smoking as integral to his qol and is not interested in stopping. He has turned down transplant eval at least twice.  Estranged relationships with children \"their choice, not mine.\"   Has homecare nursing and case management via [employer] program via [employer].      Care Planning - +[facility] on file, names Myra as his agent. DNR/DNI POLST completed 11/2021. Hospice education completed (he is not eligible currently). He has expressed interested in assisted suicide if ever legalized. Confirms he wants DNR/DNI order 9/2023. KNows he could die at anytime, may live for years.     Opioid Safety - +Naloxone  Last UDS 9/2023 as expected      History:  History gathered today from: patient, medical chart; [facility] chart    Chart review; since I saw him he saw Dr Suárez--nothing new. Remains on tocilizumab per " his [facility] rheumatologist. Saw him in March; stable; on hydroxychloroquine and prednisone too.     He reports he feels he's continuing to be a little worse; O2 sats when he exerts now 85% on 6 lpm. More congestion. Had the ducts cleaned out in their home this week, hoping that helps. More congestion. No major health events though. Feels morphine mitigates dyspnea somewhat. Feels more dyspneic if he runs out, misses a dose which happens on occasion when pharmacy can't fill in time. No constipation, no side effects.    Looking forward to summer--sitting in sun.      PE: There were no vitals taken for this visit.   Wt Readings from Last 3 Encounters:   01/30/25 101.6 kg (224 lb)   01/10/25 101.2 kg (223 lb)   08/02/24 104.3 kg (230 lb)     Alert NAD      Data reviewed:  I reviewed recent labs and imaging, my comments:  CO2 29  Cr 0.88  LFT nl  CBC nl except Hgb 13     database reviewed: y      Impression & Recommendations:  64 yo with chronic hypoxic respiratory failure from ILD on chronic MSContin for dyspnea    Dyspnea  No changes rec'd  His sx slightly slowly worsening overall    He has a rough hyperkeratotic L scalp/temporal skin lesion I looked at--rough--needs to see a dermatologist and he knows that. D/w him today. He sees PCP tomorrow too.     Encouraged he reestablish with SAWYER Jessica for psychotherapy. Discussed mood, demoralization, and coping    Follow-up 3 mo    40 minutes spent on the date of the encounter doing chart review, history and exam, patient education & counseling, documentation and other activities as noted above. The longitudinal plan of care for the diagnosis(es)/condition(s) as documented were addressed during this visit. Due to the added complexity in care, I will continue to support Garry in the subsequent management and with ongoing continuity of care.    Thank you for involving us in the patient's care.   Jostin Peralta MD / Palliative Medicine / Text me via [employer]  This note may  have been composed with voice recognition software and there may be mistranscriptions.      Again, thank you for allowing me to participate in the care of your patient.      Sincerely,    Jostin Peralta MD

## 2025-04-30 NOTE — NURSING NOTE
"Oncology Rooming Note    April 30, 2025 3:05 PM   Wilbert Hutchins is a 65 year old male who presents for:    Chief Complaint   Patient presents with    Oncology Clinic Visit     ILD (interstitial lung disease)     Initial Vitals: BP (!) 145/80 (BP Location: Left arm, Patient Position: Sitting, Cuff Size: Adult Regular)   Pulse 56   Temp 98.8  F (37.1  C) (Oral)   Wt 101.2 kg (223 lb)   SpO2 98%   PF (!) 4 L/min   BMI 32.00 kg/m   Estimated body mass index is 32 kg/m  as calculated from the following:    Height as of 1/30/25: 1.778 m (5' 10\").    Weight as of this encounter: 101.2 kg (223 lb). Body surface area is 2.24 meters squared.  No Pain (0) Comment: Data Unavailable   No LMP for male patient.  Allergies reviewed: Yes  Medications reviewed: Yes    Medications: Medication refills not needed today.  Pharmacy name entered into [employer]:    [facility] [address] [place] [address] [facility] - [place], [place] - [address]    Frailty Screening:   Is the patient here for a new oncology consult visit in cancer care? 2. No    PHQ9:  Did this patient require a PHQ9?: No      Clinical concerns: none      Efrain Toscano              "

## 2025-05-25 DIAGNOSIS — J44.9 COPD, GROUP D, BY GOLD 2017 CLASSIFICATION (H): ICD-10-CM

## 2025-05-25 DIAGNOSIS — J41.1 MUCOPURULENT CHRONIC BRONCHITIS (H): ICD-10-CM

## 2025-05-27 RX ORDER — UMECLIDINIUM BROMIDE AND VILANTEROL TRIFENATATE 62.5; 25 UG/1; UG/1
1 POWDER RESPIRATORY (INHALATION) DAILY
Qty: 60 EACH | Refills: 2 | Status: SHIPPED | OUTPATIENT
Start: 2025-05-27

## 2025-05-27 RX ORDER — IPRATROPIUM BROMIDE AND ALBUTEROL 20; 100 UG/1; UG/1
1 SPRAY, METERED RESPIRATORY (INHALATION) 4 TIMES DAILY PRN
Qty: 4 G | Refills: 2 | Status: SHIPPED | OUTPATIENT
Start: 2025-05-27

## 2025-05-27 RX ORDER — ROFLUMILAST 500 UG/1
500 TABLET ORAL
Qty: 90 TABLET | Refills: 0 | Status: SHIPPED | OUTPATIENT
Start: 2025-05-27

## 2025-06-12 ENCOUNTER — OFFICE VISIT (OUTPATIENT)
Dept: PULMONOLOGY | Facility: CLINIC | Age: 65
End: 2025-06-12
Attending: INTERNAL MEDICINE
Payer: COMMERCIAL

## 2025-06-12 VITALS — SYSTOLIC BLOOD PRESSURE: 146 MMHG | DIASTOLIC BLOOD PRESSURE: 81 MMHG | HEART RATE: 57 BPM | OXYGEN SATURATION: 97 %

## 2025-06-12 DIAGNOSIS — J84.9 ILD (INTERSTITIAL LUNG DISEASE) (H): Primary | ICD-10-CM

## 2025-06-12 PROCEDURE — G0463 HOSPITAL OUTPT CLINIC VISIT: HCPCS | Performed by: INTERNAL MEDICINE

## 2025-06-12 ASSESSMENT — PAIN SCALES - GENERAL: PAINLEVEL_OUTOF10: NO PAIN (0)

## 2025-06-12 NOTE — LETTER
6/12/2025      Wilbert Hutchins  [address]  [facility] [address]      Dear Colleague,    Thank you for referring your patient, Wilbert Hutchins, to the [facility] [place]. Please see a copy of my visit note below.    [facility]  ILD Clinic - Return Visit -         Assessment and Plan:   Wilbert Hutchins is a 65 year old male with a history of RA interstitial lung disease who presents for a return visit.       1) Rheumatoid lung, seems to have stabilized with tocilziumab   - Indeterminate for UIP in the setting of rheumatoid, DDx include DIP and RA lung (favored diagnosis)  - He continues to smoke cigarettes but down to <10 cig/day, he has tried wellbutrin in the past but suffered with side effects. Declined chantix previously, he is reducing smoking but reports hesitantion to quit at this time given concern it may cause clinical decline. Sees palliative and receiving MS Contin with some relief in dyspnea   - Was referred to lung transplant and he declined it in December 2023 given concerns about risks and recovery process, his significant other helped him with the decision   - Unsuccessful trial of Ofev; discontinued following severe diarrhea after 3 days  - Receiving tocilizumab monthly at [facility] rheumatology since summer 2024 with some improvement in dyspnea, and also notable improvement on recent PFTs 1/2025   - Completed 12 sessions of pulmonary rehab but felt it was doing more harm than good, with severe palpitations and exhaustion so he stopped it recently, however PFTs showed an improvement   - Continue monthly tocilizumab per [facility] rheumatology (last dose was 6/11/25)  A) Supplemental oxygen  - Using portable oxygen with compliance, needs 4 LPM at rest and 6 LPM with activity. Avoid any fire source at all times (recently grilled with O2 on)  B) Nocturnal  "oximetry  He is using oxygen at nighttime at 5 LPM, previously 4 LPM, declined sleep referral previoulsy, had sleep study 2018, AHI was 4.     2) Continued Tobacco Use  Trying to quit gradually, declined pharmacologic options per above. Seeing palliative care.     3) GOLD D COPD, controlled   Overlapping with ILD, but symptoms are consistent with mucopurulent chronic bronchitis, also consider bronchiectasis. Combivent use is down to morning time followed by anoro with good symptomatic response.   - Continue Anoro Ellipta  - azithromycin contraindicated given hydroxychlorquine  - No need for ICS given current systemic prednisone 10 mg daily, continue current dose per rheumatology   - Continue Daliresp 500 mcg po daily, experiencing symptomatic response with it     4) Immunocompromised (tocilizumab and prednisone)    Up to date on pneumonia and flu vaccines    RTC: 6 months    Matt Suárez MD   I spent 30 minutes  total today, reviewing medical records including progress notes, multiple imaging studies from his previous available records, and documentation.                    Interval History:     Wilbert Hutchins is a 64 year old male with interstitial lung disease who presents for a return visit.      Garry reports that he has been doing \"so-so\", reports that he has been very bothered by fatigue and shortness of breath with even minimal activity.  He has been waking up a lot at night coughing.  Denies PND no orthopnea.  Continues to have a large amount of sputum which she estimates to be about 1 cup a day, seems to be the worst in the morning after waking up.  The sputum is whitish-grayish in color and once in a while it is yellow.  He is still smoking about half a pack a day.  No recent respiratory illnesses or hospitalizations.  For oxygen use he is using 4 L during the day, 6 L during walks and recently increased the amount at night from 4-5.  He takes his Anoro inhaler once a day which he reports helps " clear his chest out in the morning and wonders if he can take it more than once because it seems to be so effective.  He uses his albuterol rescue inhaler frequently throughout the day which provides a moderate amount of relief.  He continues his prednisone 10 mg daily for flu the last and hydroxychloroquine, his rheumatologist also started him on a new infusion.  He states that he recently saw palliative care who put in a referral for pulmonary rehab which he will be going to following this appointment.    Interval updates 6/12/25:  Returns today for follow up. Stable symptoms overall and actually able to walk further distance since tocilizumab. Had one desaturation to 82% off O2 when he had to rush outdoors for a sprinkler problem. He then recovered right away on 7 lpm   He continues to be on 4 lpm during rest and 6 lpm with activity.    Smoking 1/2 ppd   Continues actemra infusion at [facility], last dose was yesterday   Anoro definitely helps him along with combivent   Taking daliresp 500 daily, no side effects   Combivent helps with am cough then takes anoro              Physical Exam:   BP (!) 146/81 (BP Location: Right arm, Patient Position: Sitting, Cuff Size: Adult Regular)   Pulse 57   SpO2 97%     GENERAL: alert, NAD, in scooter   HEENT: NCAT, EOMI  Lungs: decreased air movement, pan-inspiratory crackles more prominent at bases  CV: RRR, S1S2, no murmurs noted, capillary refill of < 2s  Neuro: Alert and Oriented to situation, follows complex directions  Psychiatric: normal affect, good eye contact  Skin: no rash, jaundice or lesions on limited exam  Extremities: No cyanosis, clubbing in fingers, trace edema.         Imaging:     HRCT chest images from 11/1/2023 reviewed, agreed with radiology:   Findings:      Lungs: Upper lobe predominant paraseptal emphysema. Unchanged  bibasilar subpleural predominant reticular opacities and traction  bronchiectasis. No honeycombing. No air trapping or mosaic  attenuation.  Stable pulmonary nodule including 6 mm right upper lobe  nodule on the right minor fissure series 3 image 135. Scattered  calcified granulomas.  Airways: Central tracheobronchial tree is clear.   Vessels: Main pulmonary artery and aorta are normal in caliber. Normal  three-vessel arch  Heart: Heart size is normal without pericardial effusion. Mild  coronary calcium.  Lymph nodes: No suspicious mediastinal or hilar lymphadenopathy.  Unchanged multiple subcentimeter mediastinal lymph nodes.  Thyroid: Imaged thyroid within normal limits.  Esophagus: Within normal limits     Upper abdomen: Evaluation of the upper abdomen is limited and without  contrast.     Bones and soft tissues: No suspicious axillary lymphadenopathy or soft  tissue mass. No suspicious osseous lesion.                                                                      Impression: Unchanged fibrotic interstitial lung disease since 2022.  Probable usual interstitial pneumonia              Pulmonary Function Tests:     My interpretation: Some mild evidence of restriction, decreased DLCO of 10.23, largely unchanged from prior PFTs in 2023.     PFTs 1/30/25:                             Again, thank you for allowing me to participate in the care of your patient.        Sincerely,        Matt Suárez MD    Electronically signed

## 2025-06-12 NOTE — PROGRESS NOTES
[facility]  ILD Clinic - Return Visit -         Assessment and Plan:   Wilbert Hutchins is a 65 year old male with a history of RA interstitial lung disease who presents for a return visit.       1) Rheumatoid lung, seems to have stabilized with tocilziumab   - Indeterminate for UIP in the setting of rheumatoid, DDx include DIP and RA lung (favored diagnosis)  - He continues to smoke cigarettes but down to <10 cig/day, he has tried wellbutrin in the past but suffered with side effects. Declined chantix previously, he is reducing smoking but reports hesitantion to quit at this time given concern it may cause clinical decline. Sees palliative and receiving MS Contin with some relief in dyspnea   - Was referred to lung transplant and he declined it in December 2023 given concerns about risks and recovery process, his significant other helped him with the decision   - Unsuccessful trial of Ofev; discontinued following severe diarrhea after 3 days  - Receiving tocilizumab monthly at [facility] rheumatology since summer 2024 with some improvement in dyspnea, and also notable improvement on recent PFTs 1/2025   - Completed 12 sessions of pulmonary rehab but felt it was doing more harm than good, with severe palpitations and exhaustion so he stopped it recently, however PFTs showed an improvement   - Continue monthly tocilizumab per [facility] rheumatology (last dose was 6/11/25)  A) Supplemental oxygen  - Using portable oxygen with compliance, needs 4 LPM at rest and 6 LPM with activity. Avoid any fire source at all times (recently grilled with O2 on)  B) Nocturnal oximetry  He is using oxygen at nighttime at 5 LPM, previously 4 LPM, declined sleep referral previoulsy, had sleep study 2018, AHI was 4.     2) Continued Tobacco Use  Trying to quit gradually, declined pharmacologic options per above. Seeing palliative care.     3) GOLD D COPD, controlled   Overlapping with  "ILD, but symptoms are consistent with mucopurulent chronic bronchitis, also consider bronchiectasis. Combivent use is down to morning time followed by anoro with good symptomatic response.   - Continue Anoro Ellipta  - azithromycin contraindicated given hydroxychlorquine  - No need for ICS given current systemic prednisone 10 mg daily, continue current dose per rheumatology   - Continue Daliresp 500 mcg po daily, experiencing symptomatic response with it     4) Immunocompromised (tocilizumab and prednisone)    Up to date on pneumonia and flu vaccines    RTC: 6 months    [facility] MD Kamini   I spent 30 minutes  total today, reviewing medical records including progress notes, multiple imaging studies from his previous available records, and documentation.                    Interval History:     Wilbert Hutchins is a 64 year old male with interstitial lung disease who presents for a return visit.      Garry reports that he has been doing \"so-so\", reports that he has been very bothered by fatigue and shortness of breath with even minimal activity.  He has been waking up a lot at night coughing.  Denies PND no orthopnea.  Continues to have a large amount of sputum which she estimates to be about 1 cup a day, seems to be the worst in the morning after waking up.  The sputum is whitish-grayish in color and once in a while it is yellow.  He is still smoking about half a pack a day.  No recent respiratory illnesses or hospitalizations.  For oxygen use he is using 4 L during the day, 6 L during walks and recently increased the amount at night from 4-5.  He takes his Anoro inhaler once a day which he reports helps clear his chest out in the morning and wonders if he can take it more than once because it seems to be so effective.  He uses his albuterol rescue inhaler frequently throughout the day which provides a moderate amount of relief.  He continues his prednisone 10 mg daily for flu the last and hydroxychloroquine, " his rheumatologist also started him on a new infusion.  He states that he recently saw palliative care who put in a referral for pulmonary rehab which he will be going to following this appointment.    Interval updates 6/12/25:  Returns today for follow up. Stable symptoms overall and actually able to walk further distance since tocilizumab. Had one desaturation to 82% off O2 when he had to rush outdoors for a sprinkler problem. He then recovered right away on 7 lpm   He continues to be on 4 lpm during rest and 6 lpm with activity.    Smoking 1/2 ppd   Continues actemra infusion at [facility], last dose was yesterday   Anoro definitely helps him along with combivent   Taking daliresp 500 daily, no side effects   Combivent helps with am cough then takes anoro              Physical Exam:   BP (!) 146/81 (BP Location: Right arm, Patient Position: Sitting, Cuff Size: Adult Regular)   Pulse 57   SpO2 97%     GENERAL: alert, NAD, in scooter   HEENT: NCAT, EOMI  Lungs: decreased air movement, pan-inspiratory crackles more prominent at bases  CV: RRR, S1S2, no murmurs noted, capillary refill of < 2s  Neuro: Alert and Oriented to situation, follows complex directions  Psychiatric: normal affect, good eye contact  Skin: no rash, jaundice or lesions on limited exam  Extremities: No cyanosis, clubbing in fingers, trace edema.         Imaging:     HRCT chest images from 11/1/2023 reviewed, agreed with radiology:   Findings:      Lungs: Upper lobe predominant paraseptal emphysema. Unchanged  bibasilar subpleural predominant reticular opacities and traction  bronchiectasis. No honeycombing. No air trapping or mosaic  attenuation. Stable pulmonary nodule including 6 mm right upper lobe  nodule on the right minor fissure series 3 image 135. Scattered  calcified granulomas.  Airways: Central tracheobronchial tree is clear.   Vessels: Main pulmonary artery and aorta are normal in caliber. Normal  three-vessel arch  Heart: Heart size is  normal without pericardial effusion. Mild  coronary calcium.  Lymph nodes: No suspicious mediastinal or hilar lymphadenopathy.  Unchanged multiple subcentimeter mediastinal lymph nodes.  Thyroid: Imaged thyroid within normal limits.  Esophagus: Within normal limits     Upper abdomen: Evaluation of the upper abdomen is limited and without  contrast.     Bones and soft tissues: No suspicious axillary lymphadenopathy or soft  tissue mass. No suspicious osseous lesion.                                                                      Impression: Unchanged fibrotic interstitial lung disease since 2022.  Probable usual interstitial pneumonia              Pulmonary Function Tests:     My interpretation: Some mild evidence of restriction, decreased DLCO of 10.23, largely unchanged from prior PFTs in 2023.     PFTs 1/30/25:

## 2025-06-12 NOTE — NURSING NOTE
Chief Complaint   Patient presents with    REturn interstitial lung     Medications reviewed and vital signs taken.   Taj Dougherty, EMT

## 2025-06-12 NOTE — PATIENT INSTRUCTIONS
Please call our direct ILD nurses line for questions and concerns: [phone]    For scheduling, please call: [phone]

## 2025-06-16 DIAGNOSIS — J84.9 ILD (INTERSTITIAL LUNG DISEASE) (H): ICD-10-CM

## 2025-06-16 DIAGNOSIS — R06.02 SHORTNESS OF BREATH: ICD-10-CM

## 2025-06-16 NOTE — TELEPHONE ENCOUNTER
Received telephone call from patient requesting refill of MS Contin.     Last refill: 5/20/2025  Last office visit: 4/30/2025  Scheduled for follow up pending per check out request     Will route request to MD/ for review.     Reviewed [place]  Report.

## 2025-06-17 RX ORDER — MORPHINE SULFATE 15 MG/1
15 TABLET, FILM COATED, EXTENDED RELEASE ORAL EVERY 12 HOURS
Qty: 60 TABLET | Refills: 0 | Status: SHIPPED | OUTPATIENT
Start: 2025-06-17

## 2025-07-15 DIAGNOSIS — R06.02 SHORTNESS OF BREATH: ICD-10-CM

## 2025-07-15 DIAGNOSIS — J84.9 ILD (INTERSTITIAL LUNG DISEASE) (H): ICD-10-CM

## 2025-07-15 RX ORDER — MORPHINE SULFATE 15 MG/1
15 TABLET, FILM COATED, EXTENDED RELEASE ORAL EVERY 12 HOURS
Qty: 60 TABLET | Refills: 0 | Status: SHIPPED | OUTPATIENT
Start: 2025-07-15

## 2025-07-15 NOTE — TELEPHONE ENCOUNTER
Received telephone call from patient requesting refill of morphine IR.     Last refill: 6/18/2025  Last office visit: 4/30/2025  Scheduled for follow up 8/20/2025    Will route request to MD/ for review.     Reviewed [place]  Report.

## 2025-08-13 DIAGNOSIS — J84.9 ILD (INTERSTITIAL LUNG DISEASE) (H): ICD-10-CM

## 2025-08-13 DIAGNOSIS — R06.02 SHORTNESS OF BREATH: ICD-10-CM

## 2025-08-13 RX ORDER — MORPHINE SULFATE 15 MG/1
15 TABLET, FILM COATED, EXTENDED RELEASE ORAL EVERY 12 HOURS
Qty: 60 TABLET | Refills: 0 | Status: SHIPPED | OUTPATIENT
Start: 2025-08-14

## 2025-08-20 ENCOUNTER — OFFICE VISIT (OUTPATIENT)
Dept: PALLIATIVE CARE | Facility: CLINIC | Age: 65
End: 2025-08-20
Attending: INTERNAL MEDICINE
Payer: COMMERCIAL

## 2025-08-20 VITALS
SYSTOLIC BLOOD PRESSURE: 154 MMHG | HEART RATE: 61 BPM | RESPIRATION RATE: 17 BRPM | TEMPERATURE: 98.6 F | OXYGEN SATURATION: 96 % | DIASTOLIC BLOOD PRESSURE: 70 MMHG

## 2025-08-20 DIAGNOSIS — R45.86 MOOD AND AFFECT DISTURBANCE: ICD-10-CM

## 2025-08-20 DIAGNOSIS — R06.02 SHORTNESS OF BREATH: ICD-10-CM

## 2025-08-20 DIAGNOSIS — J84.9 ILD (INTERSTITIAL LUNG DISEASE) (H): Primary | ICD-10-CM

## 2025-08-20 PROCEDURE — G0463 HOSPITAL OUTPT CLINIC VISIT: HCPCS | Performed by: INTERNAL MEDICINE

## 2025-08-20 ASSESSMENT — PAIN SCALES - GENERAL: PAINLEVEL_OUTOF10: NO PAIN (0)

## 2025-08-27 DIAGNOSIS — J44.9 COPD, GROUP D, BY GOLD 2017 CLASSIFICATION (H): ICD-10-CM

## 2025-08-27 DIAGNOSIS — J41.1 MUCOPURULENT CHRONIC BRONCHITIS (H): ICD-10-CM

## 2025-08-27 RX ORDER — ROFLUMILAST 500 UG/1
500 TABLET ORAL DAILY
Qty: 90 TABLET | Refills: 0 | Status: SHIPPED | OUTPATIENT
Start: 2025-08-27

## 2025-08-27 RX ORDER — IPRATROPIUM BROMIDE AND ALBUTEROL 20; 100 UG/1; UG/1
1 SPRAY, METERED RESPIRATORY (INHALATION) 4 TIMES DAILY PRN
Qty: 4 G | Refills: 0 | Status: SHIPPED | OUTPATIENT
Start: 2025-08-27

## 2025-08-27 RX ORDER — UMECLIDINIUM BROMIDE AND VILANTEROL TRIFENATATE 62.5; 25 UG/1; UG/1
1 POWDER RESPIRATORY (INHALATION) DAILY
Qty: 60 EACH | Refills: 0 | Status: SHIPPED | OUTPATIENT
Start: 2025-08-27

## (undated) RX ORDER — ALBUTEROL SULFATE 0.83 MG/ML
SOLUTION RESPIRATORY (INHALATION)
Status: DISPENSED
Start: 2019-05-31